# Patient Record
Sex: FEMALE | Race: BLACK OR AFRICAN AMERICAN | NOT HISPANIC OR LATINO | Employment: FULL TIME | URBAN - METROPOLITAN AREA
[De-identification: names, ages, dates, MRNs, and addresses within clinical notes are randomized per-mention and may not be internally consistent; named-entity substitution may affect disease eponyms.]

---

## 2017-02-23 ENCOUNTER — TELEPHONE (OUTPATIENT)
Dept: OBSTETRICS AND GYNECOLOGY | Facility: CLINIC | Age: 47
End: 2017-02-23

## 2017-02-23 NOTE — TELEPHONE ENCOUNTER
----- Message from Bro García sent at 2/23/2017  8:58 AM CST -----  Contact: self   Patient need orders for mammogram please call patient back when ready to schedule [phone] ([place])

## 2017-02-23 NOTE — TELEPHONE ENCOUNTER
I left a message for the patient letting her that we can get the mammogram the day of the appointment.

## 2017-04-13 RX ORDER — NORETHINDRONE AND ETHINYL ESTRADIOL 0.4-35(21)
KIT ORAL
Qty: 28 EACH | Refills: 0 | Status: SHIPPED | OUTPATIENT
Start: 2017-04-13 | End: 2017-05-10 | Stop reason: SDUPTHER

## 2017-05-10 ENCOUNTER — HOSPITAL ENCOUNTER (OUTPATIENT)
Dept: RADIOLOGY | Facility: CLINIC | Age: 47
Discharge: HOME | End: 2017-05-10
Attending: OBSTETRICS & GYNECOLOGY
Payer: COMMERCIAL

## 2017-05-10 ENCOUNTER — OFFICE VISIT (OUTPATIENT)
Dept: OBSTETRICS AND GYNECOLOGY | Facility: CLINIC | Age: 47
End: 2017-05-10
Payer: COMMERCIAL

## 2017-05-10 VITALS
BODY MASS INDEX: 28.84 KG/M2 | HEIGHT: 66 IN | WEIGHT: 179.44 LBS | DIASTOLIC BLOOD PRESSURE: 70 MMHG | SYSTOLIC BLOOD PRESSURE: 100 MMHG

## 2017-05-10 DIAGNOSIS — Z12.31 OTHER SCREENING MAMMOGRAM: ICD-10-CM

## 2017-05-10 DIAGNOSIS — Z12.4 PAP SMEAR FOR CERVICAL CANCER SCREENING: ICD-10-CM

## 2017-05-10 DIAGNOSIS — Z01.419 VISIT FOR GYNECOLOGIC EXAMINATION: Primary | ICD-10-CM

## 2017-05-10 PROCEDURE — 99999 PR PBB SHADOW E&M-EST. PATIENT-LVL III: CPT | Mod: PBBFAC,,, | Performed by: OBSTETRICS & GYNECOLOGY

## 2017-05-10 PROCEDURE — 99396 PREV VISIT EST AGE 40-64: CPT | Mod: S$GLB,,, | Performed by: OBSTETRICS & GYNECOLOGY

## 2017-05-10 PROCEDURE — 77067 SCR MAMMO BI INCL CAD: CPT | Mod: 26,,, | Performed by: RADIOLOGY

## 2017-05-10 PROCEDURE — 77067 SCR MAMMO BI INCL CAD: CPT | Mod: TC

## 2017-05-10 PROCEDURE — 88175 CYTOPATH C/V AUTO FLUID REDO: CPT

## 2017-05-10 RX ORDER — NORETHINDRONE AND ETHINYL ESTRADIOL AND FERROUS FUMARATE 0.4-35(21)
1 KIT ORAL DAILY
Qty: 28 EACH | Refills: 12 | Status: SHIPPED | OUTPATIENT
Start: 2017-05-10 | End: 2018-05-05 | Stop reason: SDUPTHER

## 2017-05-10 NOTE — MR AVS SNAPSHOT
[facility]  [address], [address]  [facility] [address]  Phone: [phone]  Fax: [phone]                  Matilda Decker   5/10/2017 2:00 PM   Office Visit    Description:  Female : 1970   Provider:  Tae Campos MD   [facility]           Reason for Visit     Well Woman           Diagnoses this Visit        Comments    Pap smear for cervical cancer screening    -  Primary     Other screening mammogram                To Do List           Goals (5 Years of Data)     None      [facility]     [facility] On [facility] Nurse Care Line -  Assistance  Unless otherwise directed by your provider, please contact [facility], our nurse care line that is available for  assistance.     Registered nurses in the [facility] On Call Center provide: appointment scheduling, clinical advisement, health education, and other advisory services.  Call: [phone] (toll free)               Medications           Message regarding Medications     Verify the changes and/or additions to your medication regime listed below are the same as discussed with your clinician today.  If any of these changes or additions are incorrect, please notify your healthcare provider.        STOP taking these medications     norethindrone-ethinyl estradiol (BRIELLYN) 0.4-35 mg-mcg per tablet Take 1 tablet by mouth once daily.           Verify that the below list of medications is an accurate representation of the medications you are currently taking.  If none reported, the list may be blank. If incorrect, please contact your healthcare provider. Carry this list with you in case of emergency.           Current Medications     fexofenadine (ALLEGRA) 30 MG tablet Take 30 mg by mouth 2 (two) times daily.    L. ACIDOPHILUS/DIG ENZ CMB 5 (PROBIOTIC-DIGESTIVE ENZYMES ORAL) Take by mouth.    WYMZYA FE 0.4mg-35mcg(21) and 75 mg (7) Chew TAKE 1 TABLET EVERY DAY          "  Clinical Reference Information           Your Vitals Were     BP Height Weight Last Period BMI    100/70 5' 6" (1.676 m) 81.4 kg (179 lb 7.3 oz) 05/03/2017 (Approximate) 28.96 kg/m2      Blood Pressure          Most Recent Value    BP  100/70      Allergies as of 5/10/2017     No Known Allergies      Immunizations Administered on Date of Encounter - 5/10/2017     None      Orders Placed During Today's Visit      Normal Orders This Visit    Liquid-based pap smear, screening     Future Labs/Procedures Expected by Expires    Mammo Digital Screening Bilat With CAD  5/10/2017 7/10/2018      Language Assistance Services     ATTENTION: Language assistance services are available, free of charge. Please call [phone].      ATENCIÓN: Si silvianola mason, tiene a herrera disposición servicios gratuitos de asistencia lingüística. Llame al [phone].     CHÚ Ý: N?u b?n nói Ti?ng Vi?t, có các d?ch v? h? tr? ngôn ng? mi?n phí dành cho b?n. G?i s? [phone].         [facility] complies with applicable Federal civil rights laws and does not discriminate on the basis of race, color, national origin, age, disability, or sex.        "

## 2017-05-10 NOTE — PROGRESS NOTES
Chief Complaint   Patient presents with    Well Woman       History and Physical:  Patient's last menstrual period was 2017 (approximate).       Matilda Decker is a 46 y.o. -American Female who presents today for her routine annual GYN exam. The patient has no Gynecology complaints today. No bowel or bladder complaints.       Allergies: Review of patient's allergies indicates:  No Known Allergies    Past Medical History:   Diagnosis Date    Abnormal Pap smear     repeat pap       Past Surgical History:   Procedure Laterality Date    CERVICAL BIOPSY  W/ LOOP ELECTRODE EXCISION      COLPOSCOPY         MEDS:   Current Outpatient Prescriptions on File Prior to Visit   Medication Sig Dispense Refill    fexofenadine (ALLEGRA) 30 MG tablet Take 30 mg by mouth 2 (two) times daily.      L. ACIDOPHILUS/DIG ENZ CMB 5 (PROBIOTIC-DIGESTIVE ENZYMES ORAL) Take by mouth.      WYMZYA FE 0.4mg-35mcg(21) and 75 mg (7) Chew TAKE 1 TABLET EVERY DAY 28 each 0    [DISCONTINUED] noreth-ethinyl estradiol-iron 0.4mg-35mcg(21) & 75 mg (7) Chew Take 1 tablet by mouth once daily. 28 each 4    [DISCONTINUED] norethindrone-ethinyl estradiol (BRIELLYN) 0.4-35 mg-mcg per tablet Take 1 tablet by mouth once daily. 30 tablet 11     No current facility-administered medications on file prior to visit.        OB History      Para Term  AB TAB SAB Ectopic Multiple Living    1 1                  Social History     Social History    Marital status:      Spouse name: N/A    Number of children: N/A    Years of education: N/A     Occupational History    Not on file.     Social History Main Topics    Smoking status: Never Smoker    Smokeless tobacco: Never Used    Alcohol use No    Drug use: No    Sexual activity: Not Currently     Partners: Male     Birth control/ protection: OCP     Other Topics Concern    Not on file     Social History Narrative       Family History   Problem Relation Age of Onset  "   Breast cancer Neg Hx     Ovarian cancer Neg Hx          Past medical and surgical history reviewed.   I have reviewed the patient's medical history in detail and updated the computerized patient record.        Review of System:   General: no chills, fever, night sweats, weight gain or weight loss  Psychological: no depression or suicidal ideation  Breasts: no new or changing breast lumps, nipple discharge or masses.  Respiratory: no cough, shortness of breath, or wheezing  Cardiovascular: no chest pain or dyspnea on exertion  Gastrointestinal: no abdominal pain, change in bowel habits, or black or bloody stools  Genito-Urinary: no incontinence, urinary frequency/urgency or vulvar/vaginal symptoms, pelvic pain or abnormal vaginal bleeding.  Musculoskeletal: no gait disturbance or muscular weakness      Physical Exam:   /70  Ht 5' 6" (1.676 m)  Wt 81.4 kg (179 lb 7.3 oz)  LMP 05/03/2017 (Approximate)  BMI 28.96 kg/m2  Constitutional: She is oriented to person, place, and time. She appears well-developed and well-nourished. No distress.   HENT:   Head: Normocephalic and atraumatic.   Eyes: Conjunctivae and EOM are normal. No scleral icterus.   Neck: Normal range of motion. Neck supple. No tracheal deviation present.   Cardiovascular: Normal rate.    Pulmonary/Chest: Effort normal. No respiratory distress. She exhibits no tenderness.  Breasts: are symmetrical.   Right breast exhibits no inverted nipple, no mass, no nipple discharge, no skin change and no tenderness.   Left breast exhibits no inverted nipple, no mass, no nipple discharge, no skin change and no tenderness.  Abdominal: Soft. She exhibits no distension and no mass. There is no tenderness. There is no rebound and no guarding.   Genitourinary:    External rectal exam shows no thrombosed external hemorrhoids.    Pelvic exam was performed with patient supine.   No labial fusion.   There is no rash, lesion or injury on the right labia.   There is " no rash, lesion or injury on the left labia.   No bleeding and no signs of injury around the vaginal introitus, urethra is without lesions and well supported. The cervix is visualized with no discharge, lesions or friability.   No vaginal discharge found.    No significant Cystocele, Enterocele or rectocele, and uterus well supported.   Bimanual exam:   The urethra is normal to palpation and there are no palpable vaginal wall masses.   Uterus is not deviated, not enlarged, not fixed, normal shape and not tender.   Cervix exhibits no motion tenderness.    Right adnexum displays no mass and no tenderness.   Left adnexum displays no mass and no tenderness.  Musculoskeletal: Normal range of motion.   Lymphadenopathy: No inguinal adenopathy present.   Neurological: She is alert and oriented to person, place, and time. Coordination normal.   Skin: Skin is warm and dry. She is not diaphoretic.   Psychiatric: She has a normal mood and affect.      Assessment:   Normal annual GYN exam  1. Pap smear for cervical cancer screening  Liquid-based pap smear, screening   2. Other screening mammogram  Mammo Digital Screening Bilat With CAD   doing well on oral contraceptive pills     Plan:   PAP  Mammogram  Follow up in 1 year.

## 2017-05-11 ENCOUNTER — TELEPHONE (OUTPATIENT)
Dept: RADIOLOGY | Facility: HOSPITAL | Age: 47
End: 2017-05-11

## 2017-05-12 ENCOUNTER — TELEPHONE (OUTPATIENT)
Dept: OBSTETRICS AND GYNECOLOGY | Facility: CLINIC | Age: 47
End: 2017-05-12

## 2017-05-12 NOTE — TELEPHONE ENCOUNTER
----- Message from Dulce Stephenson sent at 5/12/2017  2:36 PM CDT -----  Contact: [phone] or [phone]   Patient is returning nurse's phone call.  Please call patient back at [phone] or [phone].

## 2017-05-19 ENCOUNTER — HOSPITAL ENCOUNTER (OUTPATIENT)
Dept: RADIOLOGY | Facility: HOSPITAL | Age: 47
Discharge: HOME | End: 2017-05-19
Attending: OBSTETRICS & GYNECOLOGY
Payer: COMMERCIAL

## 2017-05-19 DIAGNOSIS — R92.8 ABNORMAL MAMMOGRAM OF RIGHT BREAST: ICD-10-CM

## 2017-05-19 PROCEDURE — 77065 DX MAMMO INCL CAD UNI: CPT | Mod: 26,,, | Performed by: RADIOLOGY

## 2017-05-19 PROCEDURE — 77061 BREAST TOMOSYNTHESIS UNI: CPT | Mod: 26,,, | Performed by: RADIOLOGY

## 2017-05-19 PROCEDURE — 77061 BREAST TOMOSYNTHESIS UNI: CPT | Mod: TC

## 2017-05-19 PROCEDURE — 76642 ULTRASOUND BREAST LIMITED: CPT | Mod: TC,PO,RT

## 2017-05-19 PROCEDURE — 76642 ULTRASOUND BREAST LIMITED: CPT | Mod: 26,RT,, | Performed by: RADIOLOGY

## 2017-12-18 ENCOUNTER — HISTORICAL (OUTPATIENT)
Dept: ADMINISTRATIVE | Facility: HOSPITAL | Age: 47
End: 2017-12-18

## 2017-12-19 LAB
LAB AP CLINICAL INFORMATION: NORMAL
LAB AP GENERAL CAT - HISTORICAL: NORMAL
LAB AP INTERPRETATION/RESULT - HISTORICAL: NEGATIVE
LAB AP SPECIMEN ADEQUACY - HISTORICAL: NORMAL
LAB AP SPECIMEN SUBMITTED - HISTORICAL: NORMAL

## 2018-05-05 RX ORDER — NORETHINDRONE AND ETHINYL ESTRADIOL AND FERROUS FUMARATE 0.4-35(21)
1 KIT ORAL DAILY
Qty: 28 EACH | Refills: 0 | Status: SHIPPED | OUTPATIENT
Start: 2018-05-05 | End: 2018-06-08 | Stop reason: SDUPTHER

## 2018-06-08 RX ORDER — NORETHINDRONE AND ETHINYL ESTRADIOL AND FERROUS FUMARATE 0.4-35(21)
KIT ORAL
Qty: 28 EACH | Refills: 0 | Status: SHIPPED | OUTPATIENT
Start: 2018-06-08 | End: 2018-06-13 | Stop reason: SDUPTHER

## 2018-06-13 ENCOUNTER — HOSPITAL ENCOUNTER (OUTPATIENT)
Dept: RADIOLOGY | Facility: HOSPITAL | Age: 48
Discharge: HOME | End: 2018-06-13
Attending: OBSTETRICS & GYNECOLOGY
Payer: COMMERCIAL

## 2018-06-13 ENCOUNTER — OFFICE VISIT (OUTPATIENT)
Dept: OBSTETRICS AND GYNECOLOGY | Facility: CLINIC | Age: 48
End: 2018-06-13
Payer: COMMERCIAL

## 2018-06-13 VITALS
BODY MASS INDEX: 28.36 KG/M2 | SYSTOLIC BLOOD PRESSURE: 102 MMHG | DIASTOLIC BLOOD PRESSURE: 70 MMHG | WEIGHT: 175.69 LBS

## 2018-06-13 DIAGNOSIS — Z12.4 ENCOUNTER FOR PAP SMEAR OF CERVIX WITH HPV DNA COTESTING: ICD-10-CM

## 2018-06-13 DIAGNOSIS — Z12.31 VISIT FOR SCREENING MAMMOGRAM: ICD-10-CM

## 2018-06-13 DIAGNOSIS — Z12.31 VISIT FOR SCREENING MAMMOGRAM: Primary | ICD-10-CM

## 2018-06-13 PROCEDURE — 99396 PREV VISIT EST AGE 40-64: CPT | Mod: S$GLB,,, | Performed by: OBSTETRICS & GYNECOLOGY

## 2018-06-13 PROCEDURE — 88175 CYTOPATH C/V AUTO FLUID REDO: CPT

## 2018-06-13 PROCEDURE — 99999 PR PBB SHADOW E&M-EST. PATIENT-LVL III: CPT | Mod: PBBFAC,,, | Performed by: OBSTETRICS & GYNECOLOGY

## 2018-06-13 PROCEDURE — 87624 HPV HI-RISK TYP POOLED RSLT: CPT

## 2018-06-13 RX ORDER — NORETHINDRONE AND ETHINYL ESTRADIOL AND FERROUS FUMARATE 0.4-35(21)
1 KIT ORAL DAILY
Qty: 28 EACH | Refills: 12 | Status: SHIPPED | OUTPATIENT
Start: 2018-06-13 | End: 2019-07-02 | Stop reason: SDUPTHER

## 2018-06-13 NOTE — PROGRESS NOTES
Chief Complaint   Patient presents with    Well Woman       History and Physical:  Patient's last menstrual period was 2018.       Matilda Decker is a 47 y.o. -American Female who presents today for her routine annual GYN exam. The patient has no Gynecology complaints today. No bowel or bladder complaints. Doing well on oral contraceptive pills       Allergies: Review of patient's allergies indicates:  No Known Allergies    Past Medical History:   Diagnosis Date    Abnormal Pap smear     repeat pap       Past Surgical History:   Procedure Laterality Date    CERVICAL BIOPSY  W/ LOOP ELECTRODE EXCISION      COLPOSCOPY         MEDS:   Current Outpatient Prescriptions on File Prior to Visit   Medication Sig Dispense Refill    [DISCONTINUED] noreth-ethinyl estradiol-iron 0.4mg-35mcg(21) and 75 mg (7) Chew TAKE 1 TABLET BY MOUTH EVERY DAY 28 each 0    fexofenadine (ALLEGRA) 30 MG tablet Take 30 mg by mouth 2 (two) times daily.      L. ACIDOPHILUS/DIG ENZ CMB 5 (PROBIOTIC-DIGESTIVE ENZYMES ORAL) Take by mouth.       No current facility-administered medications on file prior to visit.        OB History      Para Term  AB Living    1 1            SAB TAB Ectopic Multiple Live Births                       Social History     Social History    Marital status:      Spouse name: N/A    Number of children: N/A    Years of education: N/A     Occupational History    Not on file.     Social History Main Topics    Smoking status: Never Smoker    Smokeless tobacco: Never Used    Alcohol use No    Drug use: No    Sexual activity: Not Currently     Partners: Male     Birth control/ protection: OCP     Other Topics Concern    Not on file     Social History Narrative    No narrative on file       Family History   Problem Relation Age of Onset    Breast cancer Neg Hx     Ovarian cancer Neg Hx          Past medical and surgical history reviewed.   I have reviewed the patient's  medical history in detail and updated the computerized patient record.        Review of System:  General: no chills, fever, night sweats, weight gain or weight loss  Psychological: no depression or suicidal ideation  Breasts: no new or changing breast lumps, nipple discharge or masses.  Respiratory: no cough, shortness of breath, or wheezing  Cardiovascular: no chest pain or dyspnea on exertion  Gastrointestinal: no abdominal pain, change in bowel habits, or black or bloody stools  Genito-Urinary: no incontinence, urinary frequency/urgency or vulvar/vaginal symptoms, pelvic pain or abnormal vaginal bleeding.  Musculoskeletal: no gait disturbance or muscular weakness      Physical Exam:   /70   Wt 79.7 kg (175 lb 11.3 oz)   LMP 06/01/2018   BMI 28.36 kg/m²   Constitutional: She is oriented to person, place, and time. She appears well-developed and well-nourished. No distress.   HENT:   Head: Normocephalic and atraumatic.   Eyes: Conjunctivae and EOM are normal. No scleral icterus.   Neck: Normal range of motion. Neck supple. No tracheal deviation present.   Cardiovascular: Normal rate.    Pulmonary/Chest: Effort normal. No respiratory distress. She exhibits no tenderness.  Breasts: are symmetrical.   Right breast exhibits no inverted nipple, no mass, no nipple discharge, no skin change and no tenderness.   Left breast exhibits no inverted nipple, no mass, no nipple discharge, no skin change and no tenderness.  Abdominal: Soft. She exhibits no distension and no mass. There is no tenderness. There is no rebound and no guarding.   Genitourinary:    External rectal exam shows no thrombosed external hemorrhoids.    Pelvic exam was performed with patient supine.   No labial fusion.   There is no rash, lesion or injury on the right labia.   There is no rash, lesion or injury on the left labia.   No bleeding and no signs of injury around the vaginal introitus, urethra is without lesions and well supported. The  cervix is visualized with no discharge, lesions or friability.   No vaginal discharge found.    No significant Cystocele, Enterocele or rectocele, and uterus well supported.   Bimanual exam:   The urethra is normal to palpation and there are no palpable vaginal wall masses.   Uterus is not deviated, not enlarged, not fixed, normal shape and not tender.   Cervix exhibits no motion tenderness.    Right adnexum displays no mass and no tenderness.   Left adnexum displays no mass and no tenderness.  Musculoskeletal: Normal range of motion.   Lymphadenopathy: No inguinal adenopathy present.   Neurological: She is alert and oriented to person, place, and time. Coordination normal.   Skin: Skin is warm and dry. She is not diaphoretic.   Psychiatric: She has a normal mood and affect.      Assessment:   Normal annual GYN exam  1. Visit for screening mammogram  Mammo Digital Screening Bilat with CAD   2. Encounter for Pap smear of cervix with HPV DNA cotesting  Liquid-based pap smear, screening    HPV High Risk Genotypes, PCR       Plan:   PAP  Mammogram  Follow up in 1 year.  Patient informed will be contacted with results within 2 weeks. Encouraged to please call back or email if she has not heard from us by then.

## 2018-06-19 LAB
HPV HR 12 DNA CVX QL NAA+PROBE: NEGATIVE
HPV16 AG SPEC QL: NEGATIVE
HPV18 DNA SPEC QL NAA+PROBE: NEGATIVE

## 2018-06-21 ENCOUNTER — HOSPITAL ENCOUNTER (OUTPATIENT)
Dept: RADIOLOGY | Facility: HOSPITAL | Age: 48
Discharge: HOME | End: 2018-06-21
Attending: RADIOLOGY
Payer: COMMERCIAL

## 2018-06-21 ENCOUNTER — HOSPITAL ENCOUNTER (OUTPATIENT)
Dept: RADIOLOGY | Facility: HOSPITAL | Age: 48
Discharge: HOME | End: 2018-06-21
Attending: OBSTETRICS & GYNECOLOGY
Payer: COMMERCIAL

## 2018-06-21 DIAGNOSIS — R92.8 ABNORMAL MAMMOGRAM OF RIGHT BREAST: ICD-10-CM

## 2018-06-21 DIAGNOSIS — R92.8 FOLLOW-UP EXAMINATION OF ABNORMAL MAMMOGRAM: ICD-10-CM

## 2018-06-21 PROCEDURE — 77062 BREAST TOMOSYNTHESIS BI: CPT | Mod: 26,,, | Performed by: RADIOLOGY

## 2018-06-21 PROCEDURE — 77066 DX MAMMO INCL CAD BI: CPT | Mod: TC,PO

## 2018-06-21 PROCEDURE — 76642 ULTRASOUND BREAST LIMITED: CPT | Mod: TC,PO,RT

## 2018-06-21 PROCEDURE — 77067 SCR MAMMO BI INCL CAD: CPT | Mod: TC,PO

## 2018-06-21 PROCEDURE — 76642 ULTRASOUND BREAST LIMITED: CPT | Mod: TC,50,PO

## 2018-06-21 PROCEDURE — 77066 DX MAMMO INCL CAD BI: CPT | Mod: 26,,, | Performed by: RADIOLOGY

## 2018-06-21 PROCEDURE — 76642 ULTRASOUND BREAST LIMITED: CPT | Mod: 26,50,, | Performed by: RADIOLOGY

## 2018-06-22 ENCOUNTER — PATIENT MESSAGE (OUTPATIENT)
Dept: OBSTETRICS AND GYNECOLOGY | Facility: CLINIC | Age: 48
End: 2018-06-22

## 2019-07-02 RX ORDER — NORETHINDRONE AND ETHINYL ESTRADIOL AND FERROUS FUMARATE 0.4-35(21)
KIT ORAL
Qty: 30 EACH | Refills: 0 | Status: SHIPPED | OUTPATIENT
Start: 2019-07-02 | End: 2019-07-28 | Stop reason: SDUPTHER

## 2019-07-29 RX ORDER — NORETHINDRONE AND ETHINYL ESTRADIOL AND FERROUS FUMARATE 0.4-35(21)
KIT ORAL
Qty: 28 EACH | Refills: 0 | Status: SHIPPED | OUTPATIENT
Start: 2019-07-29 | End: 2019-08-08 | Stop reason: SDUPTHER

## 2019-08-08 ENCOUNTER — OFFICE VISIT (OUTPATIENT)
Dept: OBSTETRICS AND GYNECOLOGY | Facility: CLINIC | Age: 49
End: 2019-08-08
Payer: COMMERCIAL

## 2019-08-08 VITALS
DIASTOLIC BLOOD PRESSURE: 62 MMHG | WEIGHT: 190.69 LBS | BODY MASS INDEX: 30.78 KG/M2 | SYSTOLIC BLOOD PRESSURE: 116 MMHG

## 2019-08-08 DIAGNOSIS — R92.8 ABNORMAL MAMMOGRAM: ICD-10-CM

## 2019-08-08 DIAGNOSIS — Z01.419 ENCOUNTER FOR GYNECOLOGICAL EXAMINATION WITHOUT ABNORMAL FINDING: Primary | ICD-10-CM

## 2019-08-08 PROCEDURE — 88175 CYTOPATH C/V AUTO FLUID REDO: CPT

## 2019-08-08 PROCEDURE — 99999 PR PBB SHADOW E&M-EST. PATIENT-LVL III: CPT | Mod: PBBFAC,,, | Performed by: OBSTETRICS & GYNECOLOGY

## 2019-08-08 PROCEDURE — 99396 PR PREVENTIVE VISIT,EST,40-64: ICD-10-PCS | Mod: S$GLB,,, | Performed by: OBSTETRICS & GYNECOLOGY

## 2019-08-08 PROCEDURE — 99396 PREV VISIT EST AGE 40-64: CPT | Mod: S$GLB,,, | Performed by: OBSTETRICS & GYNECOLOGY

## 2019-08-08 PROCEDURE — 87624 HPV HI-RISK TYP POOLED RSLT: CPT

## 2019-08-08 PROCEDURE — 99999 PR PBB SHADOW E&M-EST. PATIENT-LVL III: ICD-10-PCS | Mod: PBBFAC,,, | Performed by: OBSTETRICS & GYNECOLOGY

## 2019-08-08 RX ORDER — NORETHINDRONE AND ETHINYL ESTRADIOL AND FERROUS FUMARATE 0.4-35(21)
1 KIT ORAL DAILY
Qty: 90 EACH | Refills: 3 | Status: SHIPPED | OUTPATIENT
Start: 2019-08-08 | End: 2020-07-15 | Stop reason: SDUPTHER

## 2019-08-08 NOTE — PROGRESS NOTES
Chief Complaint   Patient presents with    Well Woman       History and Physical:  Patient's last menstrual period was 2019.       Matilda Decker is a 48 y.o. -American Female who presents today for her routine annual GYN exam. The patient has no Gynecology complaints today. No bowel or bladder complaints. Doing well on oral contraceptive pills       Allergies: Review of patient's allergies indicates:  No Known Allergies    Past Medical History:   Diagnosis Date    Abnormal Pap smear     repeat pap       Past Surgical History:   Procedure Laterality Date    CERVICAL BIOPSY  W/ LOOP ELECTRODE EXCISION      COLPOSCOPY         MEDS:   Current Outpatient Medications on File Prior to Visit   Medication Sig Dispense Refill    fexofenadine (ALLEGRA) 30 MG tablet Take 30 mg by mouth 2 (two) times daily.      L. ACIDOPHILUS/DIG ENZ CMB 5 (PROBIOTIC-DIGESTIVE ENZYMES ORAL) Take by mouth.      noreth-ethinyl estradiol-iron 0.4mg-35mcg(21) and 75 mg (7) Chew TAKE 1 TABLET BY MOUTH EVERY DAY 28 each 0     No current facility-administered medications on file prior to visit.        OB History        2    Para   2    Term   1            AB        Living           SAB        TAB        Ectopic        Multiple        Live Births                     Social History     Socioeconomic History    Marital status:      Spouse name: Not on file    Number of children: Not on file    Years of education: Not on file    Highest education level: Not on file   Occupational History    Not on file   Social Needs    Financial resource strain: Not on file    Food insecurity:     Worry: Not on file     Inability: Not on file    Transportation needs:     Medical: Not on file     Non-medical: Not on file   Tobacco Use    Smoking status: Never Smoker    Smokeless tobacco: Never Used   Substance and Sexual Activity    Alcohol use: No    Drug use: No    Sexual activity: Not Currently     Partners:  Male     Birth control/protection: OCP   Lifestyle    Physical activity:     Days per week: Not on file     Minutes per session: Not on file    Stress: Not on file   Relationships    Social connections:     Talks on phone: Not on file     Gets together: Not on file     Attends [employer] service: Not on file     Active member of club or organization: Not on file     Attends meetings of clubs or organizations: Not on file     Relationship status: Not on file   Other Topics Concern    Not on file   Social History Narrative    Not on file       Family History   Problem Relation Age of Onset    Breast cancer Neg Hx     Ovarian cancer Neg Hx          Past medical and surgical history reviewed.   I have reviewed the patient's medical history in detail and updated the computerized patient record.        Review of System:   General: no chills, fever, night sweats, weight gain or weight loss  Psychological: no depression or suicidal ideation  Breasts: no new or changing breast lumps, nipple discharge or masses.  Respiratory: no cough, shortness of breath, or wheezing  Cardiovascular: no chest pain or dyspnea on exertion  Gastrointestinal: no abdominal pain, change in bowel habits, or black or bloody stools  Genito-Urinary: no incontinence, urinary frequency/urgency or vulvar/vaginal symptoms, pelvic pain or abnormal vaginal bleeding.  Musculoskeletal: no gait disturbance or muscular weakness      Physical Exam:   /62   Wt 86.5 kg (190 lb 11.2 oz)   LMP 07/05/2019   BMI 30.78 kg/m²   Constitutional: She appears alert and responsive. She appears well-developed, well-groomed, and well-nourished. No distress. Normal weight  HENT:   Head: Normocephalic and atraumatic.   Eyes: Conjunctivae and EOM are normal. No scleral icterus.   Neck: Symmetrical. Normal range of motion. Neck supple. No tracheal deviation present. THYROID: without masses or tenderness.  Cardiovascular: Normal rate, no rhythm abnormality noted.  Extremities without swelling or edema, warm.    Pulmonary/Chest: Normal respiratory Effort. No distress or retractions. She exhibits no tenderness.  Breasts: are symmetrical.   Right breast exhibits no inverted nipple, no mass, no nipple discharge, no skin change and no tenderness.   Left breast exhibits no inverted nipple, no mass, no nipple discharge, no skin change and no tenderness.  Abdominal: Soft. She exhibits no distension, hernias or masses. There is no tenderness. No enlargement of liver edge or spleen.  There is no rebound and no guarding.   Genitourinary:    External rectal exam shows no thrombosed external hemorrhoids, no lesions.     Pelvic exam was performed with patient supine.   No labial fusion, and symmetrical.    There is no rash, lesion or injury on the right labia.   There is no rash, lesion or injury on the left labia.   No bleeding and no signs of injury around the vaginal introitus, urethral meatus is normal size and without prolapse or lesions, urethra well supported. The cervix is visualized with no discharge, lesions or friability.   No vaginal discharge found.   No significant Cystocele, Enterocele or rectocele, and cervix and uterus well supported.   Bimanual exam:   The urethra is normal to palpation and there are no palpable vaginal wall masses.   Uterus is not deviated, not enlarged, not fixed, normal shape and not tender.   Cervix exhibits no motion tenderness.    Right adnexum displays no mass or nodularity and no tenderness.   Left adnexum displays no mass or nodularity and no tenderness.  Musculoskeletal: Normal range of motion.   Lymphadenopathy: No inguinal adenopathy present.   Neurological: She is alert and oriented to person, place, and time. Coordination normal.   Skin: Skin is warm and dry. She is not diaphoretic. No rashes, lesions or ulcers.   Psychiatric: She has a normal mood and affect, oriented to person, place, and time.      Assessment:   Normal annual GYN exam  1.  Encounter for gynecological examination without abnormal finding  Liquid-based pap smear, screening    HPV High Risk Genotypes, PCR   2. Abnormal mammogram  Mammo Digital Diagnostic Bilateral With CAD    US Breast Bilateral Complete       Plan:   PAP  Mammogram  Refill oral contraceptive pills   Follow up in 1 year   Chief Complaint   Patient presents with    Well Woman

## 2019-08-16 ENCOUNTER — PATIENT MESSAGE (OUTPATIENT)
Dept: OBSTETRICS AND GYNECOLOGY | Facility: CLINIC | Age: 49
End: 2019-08-16

## 2019-08-16 ENCOUNTER — HOSPITAL ENCOUNTER (OUTPATIENT)
Dept: RADIOLOGY | Facility: HOSPITAL | Age: 49
Discharge: HOME | End: 2019-08-16
Attending: OBSTETRICS & GYNECOLOGY
Payer: COMMERCIAL

## 2019-08-16 DIAGNOSIS — R92.8 ABNORMAL MAMMOGRAM: ICD-10-CM

## 2019-08-16 DIAGNOSIS — R92.8 ABNORMAL MAMMOGRAM OF RIGHT BREAST: ICD-10-CM

## 2019-08-16 PROCEDURE — 76642 ULTRASOUND BREAST LIMITED: CPT | Mod: 26,50,, | Performed by: RADIOLOGY

## 2019-08-16 PROCEDURE — 77062 BREAST TOMOSYNTHESIS BI: CPT | Mod: TC,PO

## 2019-08-16 PROCEDURE — 77066 DX MAMMO INCL CAD BI: CPT | Mod: 26,,, | Performed by: RADIOLOGY

## 2019-08-16 PROCEDURE — 77066 MAMMO DIGITAL DIAGNOSTIC BILAT WITH TOMOSYNTHESIS_CAD: ICD-10-PCS | Mod: 26,,, | Performed by: RADIOLOGY

## 2019-08-16 PROCEDURE — 76642 ULTRASOUND BREAST LIMITED: CPT | Mod: TC,50,PO

## 2019-08-16 PROCEDURE — 76642 US BREAST BILATERAL LIMITED: ICD-10-PCS | Mod: 26,50,, | Performed by: RADIOLOGY

## 2019-08-16 PROCEDURE — 77062 BREAST TOMOSYNTHESIS BI: CPT | Mod: 26,,, | Performed by: RADIOLOGY

## 2019-08-16 PROCEDURE — 77062 MAMMO DIGITAL DIAGNOSTIC BILAT WITH TOMOSYNTHESIS_CAD: ICD-10-PCS | Mod: 26,,, | Performed by: RADIOLOGY

## 2020-07-15 RX ORDER — NORETHINDRONE AND ETHINYL ESTRADIOL AND FERROUS FUMARATE 0.4-35(21)
1 KIT ORAL DAILY
Qty: 90 EACH | Refills: 0 | Status: SHIPPED | OUTPATIENT
Start: 2020-07-15 | End: 2020-09-29 | Stop reason: SDUPTHER

## 2020-07-15 NOTE — TELEPHONE ENCOUNTER
Refill request for OCP , next appointment due in August with [facility] and U/S , patient notified by phone to sched after 8/8/20 pap

## 2020-09-28 DIAGNOSIS — Z12.31 ENCOUNTER FOR SCREENING MAMMOGRAM FOR BREAST CANCER: Primary | ICD-10-CM

## 2020-09-29 ENCOUNTER — OFFICE VISIT (OUTPATIENT)
Dept: OBSTETRICS AND GYNECOLOGY | Facility: CLINIC | Age: 50
End: 2020-09-29
Payer: COMMERCIAL

## 2020-09-29 ENCOUNTER — HOSPITAL ENCOUNTER (OUTPATIENT)
Dept: RADIOLOGY | Facility: HOSPITAL | Age: 50
Discharge: HOME | End: 2020-09-29
Attending: OBSTETRICS & GYNECOLOGY
Payer: COMMERCIAL

## 2020-09-29 VITALS
SYSTOLIC BLOOD PRESSURE: 118 MMHG | BODY MASS INDEX: 31.56 KG/M2 | DIASTOLIC BLOOD PRESSURE: 64 MMHG | WEIGHT: 195.56 LBS

## 2020-09-29 DIAGNOSIS — Z12.31 ENCOUNTER FOR SCREENING MAMMOGRAM FOR BREAST CANCER: ICD-10-CM

## 2020-09-29 DIAGNOSIS — Z12.4 PAP SMEAR FOR CERVICAL CANCER SCREENING: Primary | ICD-10-CM

## 2020-09-29 PROCEDURE — 77063 BREAST TOMOSYNTHESIS BI: CPT | Mod: 26,,, | Performed by: RADIOLOGY

## 2020-09-29 PROCEDURE — 88175 CYTOPATH C/V AUTO FLUID REDO: CPT

## 2020-09-29 PROCEDURE — 77067 MAMMO DIGITAL SCREENING BILAT WITH TOMO: ICD-10-PCS | Mod: 26,,, | Performed by: RADIOLOGY

## 2020-09-29 PROCEDURE — 99999 PR PBB SHADOW E&M-EST. PATIENT-LVL III: CPT | Mod: PBBFAC,,, | Performed by: OBSTETRICS & GYNECOLOGY

## 2020-09-29 PROCEDURE — 77067 SCR MAMMO BI INCL CAD: CPT | Mod: TC,PN

## 2020-09-29 PROCEDURE — 99396 PREV VISIT EST AGE 40-64: CPT | Mod: S$GLB,,, | Performed by: OBSTETRICS & GYNECOLOGY

## 2020-09-29 PROCEDURE — 77063 MAMMO DIGITAL SCREENING BILAT WITH TOMO: ICD-10-PCS | Mod: 26,,, | Performed by: RADIOLOGY

## 2020-09-29 PROCEDURE — 99999 PR PBB SHADOW E&M-EST. PATIENT-LVL III: ICD-10-PCS | Mod: PBBFAC,,, | Performed by: OBSTETRICS & GYNECOLOGY

## 2020-09-29 PROCEDURE — 99396 PR PREVENTIVE VISIT,EST,40-64: ICD-10-PCS | Mod: S$GLB,,, | Performed by: OBSTETRICS & GYNECOLOGY

## 2020-09-29 PROCEDURE — 77067 SCR MAMMO BI INCL CAD: CPT | Mod: 26,,, | Performed by: RADIOLOGY

## 2020-09-29 RX ORDER — NORETHINDRONE AND ETHINYL ESTRADIOL AND FERROUS FUMARATE 0.4-35(21)
1 KIT ORAL DAILY
Qty: 90 EACH | Refills: 3 | Status: SHIPPED | OUTPATIENT
Start: 2020-09-29 | End: 2021-09-17

## 2020-09-29 NOTE — PROGRESS NOTES
Chief Complaint   Patient presents with    Well Woman       History and Physical:  Patient's last menstrual period was 2020 (approximate).       Matilda Decker is a 50 y.o. -American Female who presents today for her routine annual GYN exam. The patient has no Gynecology complaints today. No bowel or bladder complaints. - doing well on oral contraceptive pills with regular menses      Allergies: Review of patient's allergies indicates:  No Known Allergies    Past Medical History:   Diagnosis Date    Abnormal Pap smear     repeat pap       Past Surgical History:   Procedure Laterality Date    CERVICAL BIOPSY  W/ LOOP ELECTRODE EXCISION      COLPOSCOPY         MEDS:   Current Outpatient Medications on File Prior to Visit   Medication Sig Dispense Refill    fexofenadine (ALLEGRA) 30 MG tablet Take 30 mg by mouth 2 (two) times daily.      L. ACIDOPHILUS/DIG ENZ CMB 5 (PROBIOTIC-DIGESTIVE ENZYMES ORAL) Take by mouth.      noreth-ethinyl estradioL-iron 0.4mg-35mcg(21) and 75 mg (7) Chew Take 1 tablet by mouth once daily. 90 each 0     No current facility-administered medications on file prior to visit.        OB History        2    Para   2    Term   1            AB        Living           SAB        TAB        Ectopic        Multiple        Live Births                     Social History     Socioeconomic History    Marital status:      Spouse name: Not on file    Number of children: Not on file    Years of education: Not on file    Highest education level: Not on file   Occupational History    Not on file   Social Needs    Financial resource strain: Not on file    Food insecurity     Worry: Not on file     Inability: Not on file    Transportation needs     Medical: Not on file     Non-medical: Not on file   Tobacco Use    Smoking status: Never Smoker    Smokeless tobacco: Never Used   Substance and Sexual Activity    Alcohol use: No    Drug use: No    Sexual  activity: Not Currently     Partners: Male     Birth control/protection: OCP   Lifestyle    Physical activity     Days per week: Not on file     Minutes per session: Not on file    Stress: Not on file   Relationships    Social connections     Talks on phone: Not on file     Gets together: Not on file     Attends Muslim service: Not on file     Active member of club or organization: Not on file     Attends meetings of clubs or organizations: Not on file     Relationship status: Not on file   Other Topics Concern    Not on file   Social History Narrative    Not on file       Family History   Problem Relation Age of Onset    Breast cancer Neg Hx     Ovarian cancer Neg Hx          Past medical and surgical history reviewed.   I have reviewed the patient's medical history in detail and updated the computerized patient record.        Review of System:   General: no chills, fever, night sweats, weight gain or weight loss  Psychological: no depression or suicidal ideation  Breasts: no new or changing breast lumps, nipple discharge or masses.  Respiratory: no cough, shortness of breath, or wheezing  Cardiovascular: no chest pain or dyspnea on exertion  Gastrointestinal: no abdominal pain, change in bowel habits, or black or bloody stools  Genito-Urinary: no incontinence, urinary frequency/urgency or vulvar/vaginal symptoms, pelvic pain or abnormal vaginal bleeding.  Musculoskeletal: no gait disturbance or muscular weakness      Physical Exam:   /64   Wt 88.7 kg (195 lb 8.8 oz)   LMP 09/05/2020 (Approximate)   BMI 31.56 kg/m²   Constitutional: She appears alert and responsive. She appears well-developed, well-groomed, and well-nourished. No distress. OverWeight   HENT:   Head: Normocephalic and atraumatic.   Eyes: Conjunctivae and EOM are normal. No scleral icterus.   Neck: Symmetrical. Normal range of motion. Neck supple. No tracheal deviation present. THYROID: without masses or  tenderness.  Cardiovascular: Normal rate, no rhythm abnormality noted. Extremities without swelling or edema, warm.    Pulmonary/Chest: Normal respiratory Effort. No distress or retractions. She exhibits no tenderness.  Breasts: are symmetrical.   Right breast exhibits no inverted nipple, no mass, no nipple discharge, no skin change and no tenderness.   Left breast exhibits no inverted nipple, no mass, no nipple discharge, no skin change and no tenderness.  Abdominal: Soft. She exhibits no distension, hernias or masses. There is no tenderness. No enlargement of liver edge or spleen.  There is no rebound and no guarding.   Genitourinary:    External rectal exam shows no thrombosed external hemorrhoids, no lesions.     Pelvic exam was performed with patient supine.   No labial fusion, and symmetrical.    There is no rash, lesion or injury on the right labia.   There is no rash, lesion or injury on the left labia.   No bleeding and no signs of injury around the vaginal introitus, urethral meatus is normal size and without prolapse or lesions, urethra well supported. The cervix is visualized with no discharge, lesions or friability.   No vaginal discharge found.   No significant Cystocele, Enterocele or rectocele, and cervix and uterus well supported.   Bimanual exam:   The urethra is normal to palpation and there are no palpable vaginal wall masses.   Uterus is not deviated, not enlarged, not fixed, normal shape and not tender.   Cervix exhibits no motion tenderness.    Right adnexum displays no mass or nodularity and no tenderness.   Left adnexum displays no mass or nodularity and no tenderness.  Musculoskeletal: Normal range of motion.   Lymphadenopathy: No inguinal adenopathy present.   Neurological: She is alert and oriented to person, place, and time. Coordination normal.   Skin: Skin is warm and dry. She is not diaphoretic. No rashes, lesions or ulcers.   Psychiatric: She has a normal mood and affect, oriented to  person, place, and time.      Assessment:   Normal annual GYN exam  1. Pap smear for cervical cancer screening  Liquid-Based Pap Smear, Screening       Plan:   PAP  Refill oral contraceptive pills Mammogram  Follow up in 1 year.  Patient informed will be contacted with results within 2 weeks. Encouraged to please call back or email if she has not heard from us by then.

## 2020-10-07 ENCOUNTER — HISTORICAL (OUTPATIENT)
Dept: ADMINISTRATIVE | Facility: HOSPITAL | Age: 50
End: 2020-10-07

## 2020-10-07 LAB
BASOPHILS # BLD AUTO: 0.18 X10E3/UL (ref 0–0.2)
BASOPHILS NFR BLD AUTO: 3 % (ref 0–1.5)
EOSINOPHIL # BLD AUTO: 0.26 X10E3/UL (ref 0–0.8)
EOSINOPHIL NFR BLD AUTO: 4.3 % (ref 0–7)
ERYTHROCYTE [DISTWIDTH] IN BLOOD BY AUTOMATED COUNT: 15.9 % (ref 11.5–14.5)
EST. AVERAGE GLUCOSE BLD GHB EST-MCNC: 134 MG/DL
HBA1C MFR BLD HPLC: 6.6 % (ref 4.5–6.2)
HCT VFR BLD AUTO: 36.3 % (ref 42–52)
HGB BLD-MCNC: 11.8 G/DL (ref 14–18)
IMM GRANULOCYTES # BLD AUTO: 0.15 X10E3/UL (ref 0–0.04)
IMM GRANULOCYTES NFR BLD: 2.4 % (ref 0–0.4)
LYMPHOCYTES # BLD AUTO: 1.1 X10E3/UL (ref 0.9–5.2)
LYMPHOCYTES NFR BLD AUTO: 18.1 % (ref 19–48)
MCH RBC QN AUTO: 29.6 PG (ref 27–31)
MCHC RBC AUTO-ENTMCNC: 32.5 G/DL (ref 33–37)
MCV RBC AUTO: 91 FL (ref 80–94)
MONOCYTES # BLD AUTO: 1.06 X10E3/UL (ref 0.16–1)
MONOCYTES NFR BLD AUTO: 17.3 % (ref 3.4–9)
MPC BLD CALC-MCNC: 8 FL (ref 7.2–11.1)
NEUTROPHILS # BLD AUTO: 3.36 X10E3/UL (ref 1.9–8)
NEUTROPHILS NFR BLD AUTO: 55 % (ref 40–74)
PLATELET # BLD AUTO: 359 X10E3/UL (ref 130–400)
RBC # BLD AUTO: 3.99 X10E6/UL (ref 4.7–6.1)
WBC # BLD AUTO: 6.11 X10E3/UL (ref 5.2–12.4)

## 2020-10-19 ENCOUNTER — HISTORICAL (OUTPATIENT)
Dept: ADMINISTRATIVE | Facility: HOSPITAL | Age: 50
End: 2020-10-19

## 2020-10-20 LAB — GLUCOSE SERPL-MCNC: 103 MG/DL (ref 70–105)

## 2020-10-22 LAB
FINAL PATHOLOGIC DIAGNOSIS: NORMAL
Lab: NORMAL

## 2020-11-11 ENCOUNTER — HISTORICAL (OUTPATIENT)
Dept: ADMINISTRATIVE | Facility: HOSPITAL | Age: 50
End: 2020-11-11

## 2021-05-03 ENCOUNTER — HISTORICAL (OUTPATIENT)
Dept: ADMINISTRATIVE | Facility: HOSPITAL | Age: 51
End: 2021-05-03

## 2021-05-04 RX ORDER — POTASSIUM CHLORIDE 20 MEQ/1
20 TABLET, EXTENDED RELEASE ORAL 2 TIMES DAILY
COMMUNITY
End: 2021-06-23 | Stop reason: SDUPTHER

## 2021-05-04 RX ORDER — CYCLOBENZAPRINE HCL 10 MG
10 TABLET ORAL 3 TIMES DAILY PRN
COMMUNITY
End: 2022-01-24 | Stop reason: SDUPTHER

## 2021-05-04 RX ORDER — NABUMETONE 750 MG/1
750 TABLET, FILM COATED ORAL 2 TIMES DAILY
COMMUNITY
End: 2022-10-17 | Stop reason: SDUPTHER

## 2021-05-04 RX ORDER — GLIMEPIRIDE 4 MG/1
4 TABLET ORAL
COMMUNITY
End: 2021-06-23 | Stop reason: SDUPTHER

## 2021-05-04 RX ORDER — ESOMEPRAZOLE MAGNESIUM 40 MG/1
40 CAPSULE, DELAYED RELEASE ORAL
COMMUNITY
End: 2021-06-23 | Stop reason: SDUPTHER

## 2021-05-04 RX ORDER — ATORVASTATIN CALCIUM 10 MG/1
10 TABLET, FILM COATED ORAL DAILY
COMMUNITY
End: 2021-06-23 | Stop reason: SDUPTHER

## 2021-05-04 RX ORDER — FOLIC ACID 1 MG/1
1 TABLET ORAL DAILY
COMMUNITY
End: 2022-10-17 | Stop reason: SDUPTHER

## 2021-05-04 RX ORDER — METFORMIN HYDROCHLORIDE 500 MG/1
500 TABLET ORAL 2 TIMES DAILY WITH MEALS
COMMUNITY
End: 2021-06-23 | Stop reason: SDUPTHER

## 2021-05-04 RX ORDER — DICLOFENAC SODIUM 10 MG/G
2 GEL TOPICAL 4 TIMES DAILY
COMMUNITY
End: 2022-10-17 | Stop reason: SDUPTHER

## 2021-05-04 RX ORDER — METOPROLOL TARTRATE 50 MG/1
50 TABLET ORAL 2 TIMES DAILY
COMMUNITY
End: 2021-06-23 | Stop reason: SDUPTHER

## 2021-05-04 RX ORDER — HYDROCHLOROTHIAZIDE 25 MG/1
25 TABLET ORAL DAILY
COMMUNITY
End: 2021-10-29 | Stop reason: SDUPTHER

## 2021-05-05 ENCOUNTER — OFFICE VISIT (OUTPATIENT)
Dept: PAIN MEDICINE | Facility: CLINIC | Age: 51
End: 2021-05-05
Payer: MEDICARE

## 2021-05-05 VITALS
HEART RATE: 75 BPM | HEIGHT: 69 IN | SYSTOLIC BLOOD PRESSURE: 124 MMHG | RESPIRATION RATE: 19 BRPM | DIASTOLIC BLOOD PRESSURE: 77 MMHG | BODY MASS INDEX: 42.18 KG/M2 | WEIGHT: 284.81 LBS

## 2021-05-05 DIAGNOSIS — M47.817 LUMBOSACRAL SPONDYLOSIS WITHOUT MYELOPATHY: Chronic | ICD-10-CM

## 2021-05-05 DIAGNOSIS — G89.4 CHRONIC PAIN SYNDROME: Chronic | ICD-10-CM

## 2021-05-05 DIAGNOSIS — M05.79 RHEUMATOID ARTHRITIS INVOLVING MULTIPLE SITES WITH POSITIVE RHEUMATOID FACTOR: Primary | Chronic | ICD-10-CM

## 2021-05-05 PROCEDURE — 99214 OFFICE O/P EST MOD 30 MIN: CPT | Mod: S$PBB,,, | Performed by: PHYSICIAN ASSISTANT

## 2021-05-05 PROCEDURE — 99215 OFFICE O/P EST HI 40 MIN: CPT | Mod: PBBFAC | Performed by: PHYSICIAN ASSISTANT

## 2021-05-05 PROCEDURE — 99214 PR OFFICE/OUTPT VISIT, EST, LEVL IV, 30-39 MIN: ICD-10-PCS | Mod: S$PBB,,, | Performed by: PHYSICIAN ASSISTANT

## 2021-05-05 PROCEDURE — 99215 HC OFFICE/OUTPT VISIT, EST, LEVL V, 40-54 MIN: ICD-10-PCS | Mod: PBBFAC,,, | Performed by: PHYSICIAN ASSISTANT

## 2021-05-05 PROCEDURE — 99215 OFFICE O/P EST HI 40 MIN: CPT | Mod: PBBFAC,,, | Performed by: PHYSICIAN ASSISTANT

## 2021-05-05 RX ORDER — ETANERCEPT 50 MG/ML
50 SOLUTION SUBCUTANEOUS
COMMUNITY
Start: 2021-04-21

## 2021-05-05 RX ORDER — DICLOFENAC SODIUM 10 MG/G
2 GEL TOPICAL 4 TIMES DAILY
Qty: 10 TUBE | Refills: 2 | Status: SHIPPED | OUTPATIENT
Start: 2021-05-05 | End: 2021-06-04

## 2021-05-05 RX ORDER — HYDROCODONE BITARTRATE AND ACETAMINOPHEN 10; 325 MG/1; MG/1
1 TABLET ORAL EVERY 12 HOURS
Qty: 60 TABLET | Refills: 0 | Status: SHIPPED | OUTPATIENT
Start: 2021-05-05 | End: 2021-06-04

## 2021-06-07 ENCOUNTER — OFFICE VISIT (OUTPATIENT)
Dept: PAIN MEDICINE | Facility: CLINIC | Age: 51
End: 2021-06-07
Payer: MEDICARE

## 2021-06-07 VITALS
BODY MASS INDEX: 42.51 KG/M2 | WEIGHT: 287 LBS | RESPIRATION RATE: 17 BRPM | SYSTOLIC BLOOD PRESSURE: 144 MMHG | HEIGHT: 69 IN | DIASTOLIC BLOOD PRESSURE: 72 MMHG | HEART RATE: 63 BPM

## 2021-06-07 DIAGNOSIS — M05.79 RHEUMATOID ARTHRITIS INVOLVING MULTIPLE SITES WITH POSITIVE RHEUMATOID FACTOR: Chronic | ICD-10-CM

## 2021-06-07 DIAGNOSIS — Z79.899 ENCOUNTER FOR LONG-TERM (CURRENT) USE OF OTHER MEDICATIONS: Primary | ICD-10-CM

## 2021-06-07 DIAGNOSIS — M54.2 CERVICALGIA: Chronic | ICD-10-CM

## 2021-06-07 DIAGNOSIS — G89.4 CHRONIC PAIN SYNDROME: Chronic | ICD-10-CM

## 2021-06-07 DIAGNOSIS — M79.18 MYOFASCIAL PAIN: Chronic | ICD-10-CM

## 2021-06-07 LAB
CTP QC/QA: YES
POC (AMP) AMPHETAMINE: NEGATIVE
POC (BAR) BARBITURATES: NEGATIVE
POC (BUP) BUPRENORPHINE: NEGATIVE
POC (BZO) BENZODIAZEPINES: NEGATIVE
POC (COC) COCAINE: NEGATIVE
POC (MDMA) METHYLENEDIOXYMETHAMPHETAMINE 3,4: NEGATIVE
POC (MET) METHAMPHETAMINE: NEGATIVE
POC (MOP) OPIATES: ABNORMAL
POC (MTD) METHADONE: NEGATIVE
POC (OXY) OXYCODONE: NEGATIVE
POC (PCP) PHENCYCLIDINE: NEGATIVE
POC (TCA) TRICYCLIC ANTIDEPRESSANTS: NEGATIVE
POC TEMPERATURE (URINE): 94
POC THC: NEGATIVE

## 2021-06-07 PROCEDURE — 99214 PR OFFICE/OUTPT VISIT, EST, LEVL IV, 30-39 MIN: ICD-10-PCS | Mod: S$PBB,25,, | Performed by: PAIN MEDICINE

## 2021-06-07 PROCEDURE — 96372 THER/PROPH/DIAG INJ SC/IM: CPT | Mod: PBBFAC | Performed by: PAIN MEDICINE

## 2021-06-07 PROCEDURE — 80305 DRUG TEST PRSMV DIR OPT OBS: CPT | Mod: PBBFAC | Performed by: PAIN MEDICINE

## 2021-06-07 PROCEDURE — 99215 OFFICE O/P EST HI 40 MIN: CPT | Mod: PBBFAC,25 | Performed by: PAIN MEDICINE

## 2021-06-07 PROCEDURE — 99214 OFFICE O/P EST MOD 30 MIN: CPT | Mod: S$PBB,25,, | Performed by: PAIN MEDICINE

## 2021-06-07 RX ORDER — HYDROCODONE BITARTRATE AND ACETAMINOPHEN 10; 325 MG/1; MG/1
1 TABLET ORAL EVERY 12 HOURS PRN
Qty: 60 TABLET | Refills: 0 | Status: SHIPPED | OUTPATIENT
Start: 2021-07-07 | End: 2021-08-06

## 2021-06-07 RX ORDER — HYDROCODONE BITARTRATE AND ACETAMINOPHEN 10; 325 MG/1; MG/1
1 TABLET ORAL EVERY 12 HOURS PRN
COMMUNITY
End: 2021-08-05 | Stop reason: SDUPTHER

## 2021-06-07 RX ORDER — HYDROCODONE BITARTRATE AND ACETAMINOPHEN 10; 325 MG/1; MG/1
1 TABLET ORAL EVERY 12 HOURS PRN
Qty: 60 TABLET | Refills: 0 | Status: SHIPPED | OUTPATIENT
Start: 2021-06-07 | End: 2021-07-07

## 2021-06-07 RX ORDER — KETOROLAC TROMETHAMINE 30 MG/ML
60 INJECTION, SOLUTION INTRAMUSCULAR; INTRAVENOUS
Status: COMPLETED | OUTPATIENT
Start: 2021-06-07 | End: 2021-06-07

## 2021-06-07 RX ADMIN — KETOROLAC TROMETHAMINE 60 MG: 30 INJECTION, SOLUTION INTRAMUSCULAR at 03:06

## 2021-06-23 DIAGNOSIS — I10 HYPERTENSION, UNSPECIFIED TYPE: ICD-10-CM

## 2021-06-23 DIAGNOSIS — E11.9 TYPE 2 DIABETES MELLITUS WITHOUT COMPLICATION, WITHOUT LONG-TERM CURRENT USE OF INSULIN: Primary | ICD-10-CM

## 2021-06-23 DIAGNOSIS — E78.5 HYPERLIPIDEMIA, UNSPECIFIED HYPERLIPIDEMIA TYPE: ICD-10-CM

## 2021-06-23 DIAGNOSIS — K21.9 GASTROESOPHAGEAL REFLUX DISEASE WITHOUT ESOPHAGITIS: ICD-10-CM

## 2021-06-24 RX ORDER — POTASSIUM CHLORIDE 20 MEQ/1
20 TABLET, EXTENDED RELEASE ORAL 2 TIMES DAILY
Qty: 180 TABLET | Refills: 3 | Status: SHIPPED | OUTPATIENT
Start: 2021-06-24 | End: 2022-06-14

## 2021-06-24 RX ORDER — ATORVASTATIN CALCIUM 10 MG/1
10 TABLET, FILM COATED ORAL DAILY
Qty: 90 TABLET | Refills: 3 | Status: SHIPPED | OUTPATIENT
Start: 2021-06-24 | End: 2022-07-05

## 2021-06-24 RX ORDER — ESOMEPRAZOLE MAGNESIUM 40 MG/1
40 CAPSULE, DELAYED RELEASE ORAL
Qty: 90 CAPSULE | Refills: 3 | Status: SHIPPED | OUTPATIENT
Start: 2021-06-24 | End: 2022-04-13

## 2021-06-24 RX ORDER — GLIMEPIRIDE 4 MG/1
4 TABLET ORAL
Qty: 90 TABLET | Refills: 3 | Status: SHIPPED | OUTPATIENT
Start: 2021-06-24 | End: 2021-12-01 | Stop reason: SDUPTHER

## 2021-06-24 RX ORDER — METOPROLOL TARTRATE 50 MG/1
50 TABLET ORAL 2 TIMES DAILY
Qty: 180 TABLET | Refills: 3 | Status: SHIPPED | OUTPATIENT
Start: 2021-06-24 | End: 2022-06-14

## 2021-06-24 RX ORDER — METFORMIN HYDROCHLORIDE 500 MG/1
500 TABLET ORAL 2 TIMES DAILY WITH MEALS
Qty: 90 TABLET | Refills: 3 | Status: SHIPPED | OUTPATIENT
Start: 2021-06-24 | End: 2021-11-18

## 2021-08-05 ENCOUNTER — OFFICE VISIT (OUTPATIENT)
Dept: PAIN MEDICINE | Facility: CLINIC | Age: 51
End: 2021-08-05
Payer: MEDICARE

## 2021-08-05 VITALS
HEIGHT: 70 IN | BODY MASS INDEX: 40.52 KG/M2 | HEART RATE: 61 BPM | SYSTOLIC BLOOD PRESSURE: 162 MMHG | DIASTOLIC BLOOD PRESSURE: 86 MMHG | WEIGHT: 283 LBS | RESPIRATION RATE: 16 BRPM

## 2021-08-05 DIAGNOSIS — M05.79 RHEUMATOID ARTHRITIS INVOLVING MULTIPLE SITES WITH POSITIVE RHEUMATOID FACTOR: Chronic | ICD-10-CM

## 2021-08-05 DIAGNOSIS — M47.817 LUMBOSACRAL SPONDYLOSIS WITHOUT MYELOPATHY: Primary | Chronic | ICD-10-CM

## 2021-08-05 PROCEDURE — 3008F BODY MASS INDEX DOCD: CPT | Mod: CPTII,,, | Performed by: PHYSICIAN ASSISTANT

## 2021-08-05 PROCEDURE — 3079F DIAST BP 80-89 MM HG: CPT | Mod: CPTII,,, | Performed by: PHYSICIAN ASSISTANT

## 2021-08-05 PROCEDURE — 3079F PR MOST RECENT DIASTOLIC BLOOD PRESSURE 80-89 MM HG: ICD-10-PCS | Mod: CPTII,,, | Performed by: PHYSICIAN ASSISTANT

## 2021-08-05 PROCEDURE — 96372 THER/PROPH/DIAG INJ SC/IM: CPT | Mod: PBBFAC | Performed by: PHYSICIAN ASSISTANT

## 2021-08-05 PROCEDURE — 1125F AMNT PAIN NOTED PAIN PRSNT: CPT | Mod: CPTII,,, | Performed by: PHYSICIAN ASSISTANT

## 2021-08-05 PROCEDURE — 99214 PR OFFICE/OUTPT VISIT, EST, LEVL IV, 30-39 MIN: ICD-10-PCS | Mod: S$PBB,25,, | Performed by: PHYSICIAN ASSISTANT

## 2021-08-05 PROCEDURE — 3077F PR MOST RECENT SYSTOLIC BLOOD PRESSURE >= 140 MM HG: ICD-10-PCS | Mod: CPTII,,, | Performed by: PHYSICIAN ASSISTANT

## 2021-08-05 PROCEDURE — 99213 OFFICE O/P EST LOW 20 MIN: CPT | Mod: PBBFAC | Performed by: PHYSICIAN ASSISTANT

## 2021-08-05 PROCEDURE — 1125F PR PAIN SEVERITY QUANTIFIED, PAIN PRESENT: ICD-10-PCS | Mod: CPTII,,, | Performed by: PHYSICIAN ASSISTANT

## 2021-08-05 PROCEDURE — 99214 OFFICE O/P EST MOD 30 MIN: CPT | Mod: S$PBB,25,, | Performed by: PHYSICIAN ASSISTANT

## 2021-08-05 PROCEDURE — 3077F SYST BP >= 140 MM HG: CPT | Mod: CPTII,,, | Performed by: PHYSICIAN ASSISTANT

## 2021-08-05 PROCEDURE — 3008F PR BODY MASS INDEX (BMI) DOCUMENTED: ICD-10-PCS | Mod: CPTII,,, | Performed by: PHYSICIAN ASSISTANT

## 2021-08-05 RX ORDER — KETOROLAC TROMETHAMINE 30 MG/ML
60 INJECTION, SOLUTION INTRAMUSCULAR; INTRAVENOUS
Status: COMPLETED | OUTPATIENT
Start: 2021-08-05 | End: 2021-08-05

## 2021-08-05 RX ORDER — HYDROCODONE BITARTRATE AND ACETAMINOPHEN 10; 325 MG/1; MG/1
1 TABLET ORAL EVERY 12 HOURS
Qty: 60 TABLET | Refills: 0 | Status: SHIPPED | OUTPATIENT
Start: 2021-08-07 | End: 2021-09-03 | Stop reason: SDUPTHER

## 2021-08-05 RX ADMIN — KETOROLAC TROMETHAMINE 60 MG: 30 INJECTION, SOLUTION INTRAMUSCULAR; INTRAVENOUS at 03:08

## 2021-09-07 ENCOUNTER — OFFICE VISIT (OUTPATIENT)
Dept: PAIN MEDICINE | Facility: CLINIC | Age: 51
End: 2021-09-07
Payer: MEDICARE

## 2021-09-07 VITALS
SYSTOLIC BLOOD PRESSURE: 137 MMHG | WEIGHT: 286 LBS | BODY MASS INDEX: 42.36 KG/M2 | HEIGHT: 69 IN | HEART RATE: 68 BPM | DIASTOLIC BLOOD PRESSURE: 70 MMHG

## 2021-09-07 DIAGNOSIS — M05.79 RHEUMATOID ARTHRITIS INVOLVING MULTIPLE SITES WITH POSITIVE RHEUMATOID FACTOR: Primary | Chronic | ICD-10-CM

## 2021-09-07 DIAGNOSIS — M47.817 LUMBOSACRAL SPONDYLOSIS WITHOUT MYELOPATHY: Chronic | ICD-10-CM

## 2021-09-07 DIAGNOSIS — Z79.899 ENCOUNTER FOR LONG-TERM (CURRENT) USE OF OTHER MEDICATIONS: ICD-10-CM

## 2021-09-07 PROCEDURE — 3008F BODY MASS INDEX DOCD: CPT | Mod: CPTII,,, | Performed by: PHYSICIAN ASSISTANT

## 2021-09-07 PROCEDURE — 3078F DIAST BP <80 MM HG: CPT | Mod: CPTII,,, | Performed by: PHYSICIAN ASSISTANT

## 2021-09-07 PROCEDURE — 99214 OFFICE O/P EST MOD 30 MIN: CPT | Mod: S$PBB,,, | Performed by: PHYSICIAN ASSISTANT

## 2021-09-07 PROCEDURE — 99214 OFFICE O/P EST MOD 30 MIN: CPT | Mod: PBBFAC | Performed by: PHYSICIAN ASSISTANT

## 2021-09-07 PROCEDURE — 3078F PR MOST RECENT DIASTOLIC BLOOD PRESSURE < 80 MM HG: ICD-10-PCS | Mod: CPTII,,, | Performed by: PHYSICIAN ASSISTANT

## 2021-09-07 PROCEDURE — 3008F PR BODY MASS INDEX (BMI) DOCUMENTED: ICD-10-PCS | Mod: CPTII,,, | Performed by: PHYSICIAN ASSISTANT

## 2021-09-07 PROCEDURE — 3075F PR MOST RECENT SYSTOLIC BLOOD PRESS GE 130-139MM HG: ICD-10-PCS | Mod: CPTII,,, | Performed by: PHYSICIAN ASSISTANT

## 2021-09-07 PROCEDURE — 80305 DRUG TEST PRSMV DIR OPT OBS: CPT | Mod: PBBFAC | Performed by: PHYSICIAN ASSISTANT

## 2021-09-07 PROCEDURE — 1159F PR MEDICATION LIST DOCUMENTED IN MEDICAL RECORD: ICD-10-PCS | Mod: CPTII,,, | Performed by: PHYSICIAN ASSISTANT

## 2021-09-07 PROCEDURE — 3075F SYST BP GE 130 - 139MM HG: CPT | Mod: CPTII,,, | Performed by: PHYSICIAN ASSISTANT

## 2021-09-07 PROCEDURE — 99214 PR OFFICE/OUTPT VISIT, EST, LEVL IV, 30-39 MIN: ICD-10-PCS | Mod: S$PBB,,, | Performed by: PHYSICIAN ASSISTANT

## 2021-09-07 PROCEDURE — 1159F MED LIST DOCD IN RCRD: CPT | Mod: CPTII,,, | Performed by: PHYSICIAN ASSISTANT

## 2021-09-07 RX ORDER — HYDROCODONE BITARTRATE AND ACETAMINOPHEN 10; 325 MG/1; MG/1
1 TABLET ORAL EVERY 12 HOURS
Qty: 60 TABLET | Refills: 0 | Status: SHIPPED | OUTPATIENT
Start: 2021-09-07 | End: 2021-10-07

## 2021-09-07 RX ORDER — HYDROCODONE BITARTRATE AND ACETAMINOPHEN 10; 325 MG/1; MG/1
1 TABLET ORAL EVERY 12 HOURS
Qty: 60 TABLET | Refills: 0 | Status: SHIPPED | OUTPATIENT
Start: 2021-10-07 | End: 2021-11-03 | Stop reason: SDUPTHER

## 2021-10-05 ENCOUNTER — HOSPITAL ENCOUNTER (OUTPATIENT)
Dept: RADIOLOGY | Facility: HOSPITAL | Age: 51
Discharge: HOME | End: 2021-10-05
Payer: MEDICARE

## 2021-10-05 VITALS — WEIGHT: 286 LBS | HEIGHT: 69 IN | BODY MASS INDEX: 42.36 KG/M2

## 2021-10-05 DIAGNOSIS — Z12.31 VISIT FOR SCREENING MAMMOGRAM: ICD-10-CM

## 2021-10-05 PROCEDURE — 77067 SCR MAMMO BI INCL CAD: CPT | Mod: TC

## 2021-10-13 ENCOUNTER — OFFICE VISIT (OUTPATIENT)
Dept: OBSTETRICS AND GYNECOLOGY | Facility: CLINIC | Age: 51
End: 2021-10-13
Payer: COMMERCIAL

## 2021-10-13 ENCOUNTER — HOSPITAL ENCOUNTER (OUTPATIENT)
Dept: RADIOLOGY | Facility: HOSPITAL | Age: 51
Discharge: HOME | End: 2021-10-13
Attending: OBSTETRICS & GYNECOLOGY
Payer: COMMERCIAL

## 2021-10-13 VITALS
RESPIRATION RATE: 16 BRPM | SYSTOLIC BLOOD PRESSURE: 108 MMHG | DIASTOLIC BLOOD PRESSURE: 66 MMHG | BODY MASS INDEX: 30.93 KG/M2 | HEIGHT: 66 IN | WEIGHT: 192.44 LBS

## 2021-10-13 DIAGNOSIS — Z01.419 ENCOUNTER FOR ROUTINE GYNECOLOGICAL EXAMINATION WITH PAPANICOLAOU SMEAR OF CERVIX: Primary | ICD-10-CM

## 2021-10-13 DIAGNOSIS — Z12.31 SCREENING MAMMOGRAM, ENCOUNTER FOR: ICD-10-CM

## 2021-10-13 PROCEDURE — 99999 PR PBB SHADOW E&M-EST. PATIENT-LVL III: ICD-10-PCS | Mod: PBBFAC,,, | Performed by: OBSTETRICS & GYNECOLOGY

## 2021-10-13 PROCEDURE — 99396 PR PREVENTIVE VISIT,EST,40-64: ICD-10-PCS | Mod: S$GLB,,, | Performed by: OBSTETRICS & GYNECOLOGY

## 2021-10-13 PROCEDURE — 77067 MAMMO DIGITAL SCREENING BILAT WITH TOMO: ICD-10-PCS | Mod: 26,,, | Performed by: RADIOLOGY

## 2021-10-13 PROCEDURE — 99999 PR PBB SHADOW E&M-EST. PATIENT-LVL III: CPT | Mod: PBBFAC,,, | Performed by: OBSTETRICS & GYNECOLOGY

## 2021-10-13 PROCEDURE — 99396 PREV VISIT EST AGE 40-64: CPT | Mod: S$GLB,,, | Performed by: OBSTETRICS & GYNECOLOGY

## 2021-10-13 PROCEDURE — 77063 BREAST TOMOSYNTHESIS BI: CPT | Mod: 26,,, | Performed by: RADIOLOGY

## 2021-10-13 PROCEDURE — 88175 CYTOPATH C/V AUTO FLUID REDO: CPT | Performed by: OBSTETRICS & GYNECOLOGY

## 2021-10-13 PROCEDURE — 77063 MAMMO DIGITAL SCREENING BILAT WITH TOMO: ICD-10-PCS | Mod: 26,,, | Performed by: RADIOLOGY

## 2021-10-13 PROCEDURE — 77067 SCR MAMMO BI INCL CAD: CPT | Mod: 26,,, | Performed by: RADIOLOGY

## 2021-10-13 PROCEDURE — 77067 SCR MAMMO BI INCL CAD: CPT | Mod: TC,PN

## 2021-10-13 RX ORDER — NORETHINDRONE ACETATE AND ETHINYL ESTRADIOL .02; 1 MG/1; MG/1
1 TABLET ORAL DAILY
Qty: 30 TABLET | Refills: 11 | Status: SHIPPED | OUTPATIENT
Start: 2021-10-13 | End: 2022-05-26 | Stop reason: SDUPTHER

## 2021-10-22 LAB
CYTOLOGIST CVX/VAG CYTO: ABNORMAL
CYTOLOGY CVX/VAG DOC CYTO: ABNORMAL
CYTOLOGY CVX/VAG DOC THIN PREP: ABNORMAL
CYTOLOGY THINPREP PAP COMMENT: ABNORMAL
CYTOLOGY THINPREP PAP COMMENT: ABNORMAL
DX ICD CODE: ABNORMAL
PAP NOTE: ABNORMAL
PATHOLOGIST CVX/VAG CYTO: ABNORMAL
STAT OF ADQ CVX/VAG CYTO-IMP: ABNORMAL

## 2021-10-26 ENCOUNTER — HOSPITAL ENCOUNTER (OUTPATIENT)
Dept: RADIOLOGY | Facility: HOSPITAL | Age: 51
Discharge: HOME | End: 2021-10-26
Attending: RADIOLOGY
Payer: MEDICARE

## 2021-10-26 ENCOUNTER — HOSPITAL ENCOUNTER (OUTPATIENT)
Dept: RADIOLOGY | Facility: HOSPITAL | Age: 51
Discharge: HOME | End: 2021-10-26
Attending: STUDENT IN AN ORGANIZED HEALTH CARE EDUCATION/TRAINING PROGRAM
Payer: MEDICARE

## 2021-10-26 DIAGNOSIS — R92.8 ABNORMAL FINDING ON BREAST IMAGING: ICD-10-CM

## 2021-10-26 PROCEDURE — 27201044 US BREAST BIOPSY WITH IMAGING 1ST SITE LEFT

## 2021-10-26 PROCEDURE — 88305 SURGICAL PATHOLOGY: ICD-10-PCS | Mod: 26,,, | Performed by: PATHOLOGY

## 2021-10-26 PROCEDURE — 88342 IMHCHEM/IMCYTCHM 1ST ANTB: CPT | Mod: 26,,, | Performed by: PATHOLOGY

## 2021-10-26 PROCEDURE — 88341 IMHCHEM/IMCYTCHM EA ADD ANTB: CPT | Mod: 26,,, | Performed by: PATHOLOGY

## 2021-10-26 PROCEDURE — 88342 SURGICAL PATHOLOGY: ICD-10-PCS | Mod: 26,,, | Performed by: PATHOLOGY

## 2021-10-26 PROCEDURE — 88305 TISSUE EXAM BY PATHOLOGIST: CPT | Mod: SUR | Performed by: STUDENT IN AN ORGANIZED HEALTH CARE EDUCATION/TRAINING PROGRAM

## 2021-10-26 PROCEDURE — 88341 SURGICAL PATHOLOGY: ICD-10-PCS | Mod: 26,,, | Performed by: PATHOLOGY

## 2021-10-26 PROCEDURE — 88305 TISSUE EXAM BY PATHOLOGIST: CPT | Mod: 26,,, | Performed by: PATHOLOGY

## 2021-10-26 PROCEDURE — 76642 ULTRASOUND BREAST LIMITED: CPT | Mod: TC,LT

## 2021-10-26 PROCEDURE — 77065 DX MAMMO INCL CAD UNI: CPT | Mod: TC,LT

## 2021-10-26 RX ORDER — LIDOCAINE HYDROCHLORIDE AND EPINEPHRINE 10; 10 MG/ML; UG/ML
10 INJECTION, SOLUTION INFILTRATION; PERINEURAL ONCE
Status: DISCONTINUED | OUTPATIENT
Start: 2021-10-26 | End: 2021-10-27 | Stop reason: HOSPADM

## 2021-10-26 RX ORDER — LIDOCAINE HYDROCHLORIDE 10 MG/ML
5 INJECTION INFILTRATION; PERINEURAL ONCE
Status: DISCONTINUED | OUTPATIENT
Start: 2021-10-26 | End: 2021-10-27 | Stop reason: HOSPADM

## 2021-10-27 ENCOUNTER — TELEPHONE (OUTPATIENT)
Dept: OBSTETRICS AND GYNECOLOGY | Facility: CLINIC | Age: 51
End: 2021-10-27
Payer: COMMERCIAL

## 2021-10-28 ENCOUNTER — TELEPHONE (OUTPATIENT)
Dept: RADIOLOGY | Facility: HOSPITAL | Age: 51
End: 2021-10-28

## 2021-10-28 LAB
DHEA SERPL-MCNC: NORMAL
ESTROGEN SERPL-MCNC: NORMAL PG/ML
LAB AP CLINICAL INFORMATION: NORMAL
LAB AP GROSS DESCRIPTION: NORMAL
LAB AP LABORATORY NOTES: NORMAL
T3RU NFR SERPL: NORMAL %

## 2021-10-29 DIAGNOSIS — I10 PRIMARY HYPERTENSION: Primary | ICD-10-CM

## 2021-11-01 RX ORDER — HYDROCHLOROTHIAZIDE 25 MG/1
25 TABLET ORAL DAILY
Qty: 30 TABLET | Refills: 3 | Status: SHIPPED | OUTPATIENT
Start: 2021-11-01 | End: 2021-12-01 | Stop reason: SDUPTHER

## 2021-11-04 ENCOUNTER — OFFICE VISIT (OUTPATIENT)
Dept: PAIN MEDICINE | Facility: CLINIC | Age: 51
End: 2021-11-04
Payer: MEDICARE

## 2021-11-04 VITALS
BODY MASS INDEX: 43.4 KG/M2 | HEIGHT: 69 IN | SYSTOLIC BLOOD PRESSURE: 192 MMHG | RESPIRATION RATE: 20 BRPM | DIASTOLIC BLOOD PRESSURE: 80 MMHG | WEIGHT: 293 LBS | HEART RATE: 57 BPM

## 2021-11-04 DIAGNOSIS — M05.79 RHEUMATOID ARTHRITIS INVOLVING MULTIPLE SITES WITH POSITIVE RHEUMATOID FACTOR: Primary | Chronic | ICD-10-CM

## 2021-11-04 DIAGNOSIS — M47.817 LUMBOSACRAL SPONDYLOSIS WITHOUT MYELOPATHY: Chronic | ICD-10-CM

## 2021-11-04 PROCEDURE — 1159F MED LIST DOCD IN RCRD: CPT | Mod: CPTII,,, | Performed by: PHYSICIAN ASSISTANT

## 2021-11-04 PROCEDURE — 99214 OFFICE O/P EST MOD 30 MIN: CPT | Mod: PBBFAC,25 | Performed by: PHYSICIAN ASSISTANT

## 2021-11-04 PROCEDURE — 99214 OFFICE O/P EST MOD 30 MIN: CPT | Mod: S$PBB,25,, | Performed by: PHYSICIAN ASSISTANT

## 2021-11-04 PROCEDURE — 3077F PR MOST RECENT SYSTOLIC BLOOD PRESSURE >= 140 MM HG: ICD-10-PCS | Mod: CPTII,,, | Performed by: PHYSICIAN ASSISTANT

## 2021-11-04 PROCEDURE — 3008F PR BODY MASS INDEX (BMI) DOCUMENTED: ICD-10-PCS | Mod: CPTII,,, | Performed by: PHYSICIAN ASSISTANT

## 2021-11-04 PROCEDURE — 3079F PR MOST RECENT DIASTOLIC BLOOD PRESSURE 80-89 MM HG: ICD-10-PCS | Mod: CPTII,,, | Performed by: PHYSICIAN ASSISTANT

## 2021-11-04 PROCEDURE — 3008F BODY MASS INDEX DOCD: CPT | Mod: CPTII,,, | Performed by: PHYSICIAN ASSISTANT

## 2021-11-04 PROCEDURE — 3077F SYST BP >= 140 MM HG: CPT | Mod: CPTII,,, | Performed by: PHYSICIAN ASSISTANT

## 2021-11-04 PROCEDURE — 99214 PR OFFICE/OUTPT VISIT, EST, LEVL IV, 30-39 MIN: ICD-10-PCS | Mod: S$PBB,25,, | Performed by: PHYSICIAN ASSISTANT

## 2021-11-04 PROCEDURE — 3079F DIAST BP 80-89 MM HG: CPT | Mod: CPTII,,, | Performed by: PHYSICIAN ASSISTANT

## 2021-11-04 PROCEDURE — 96372 THER/PROPH/DIAG INJ SC/IM: CPT | Mod: PBBFAC | Performed by: PHYSICIAN ASSISTANT

## 2021-11-04 PROCEDURE — 1159F PR MEDICATION LIST DOCUMENTED IN MEDICAL RECORD: ICD-10-PCS | Mod: CPTII,,, | Performed by: PHYSICIAN ASSISTANT

## 2021-11-04 RX ORDER — KETOROLAC TROMETHAMINE 30 MG/ML
60 INJECTION, SOLUTION INTRAMUSCULAR; INTRAVENOUS
Status: COMPLETED | OUTPATIENT
Start: 2021-11-04 | End: 2021-11-04

## 2021-11-04 RX ORDER — HYDROCODONE BITARTRATE AND ACETAMINOPHEN 10; 325 MG/1; MG/1
1 TABLET ORAL EVERY 12 HOURS
Qty: 60 TABLET | Refills: 0 | Status: SHIPPED | OUTPATIENT
Start: 2021-11-06 | End: 2021-12-06 | Stop reason: SDUPTHER

## 2021-11-04 RX ADMIN — KETOROLAC TROMETHAMINE 60 MG: 30 INJECTION, SOLUTION INTRAMUSCULAR at 03:11

## 2021-11-15 ENCOUNTER — OFFICE VISIT (OUTPATIENT)
Dept: OBSTETRICS AND GYNECOLOGY | Facility: CLINIC | Age: 51
End: 2021-11-15
Payer: COMMERCIAL

## 2021-11-15 VITALS — WEIGHT: 194.88 LBS | BODY MASS INDEX: 31.46 KG/M2 | DIASTOLIC BLOOD PRESSURE: 70 MMHG | SYSTOLIC BLOOD PRESSURE: 98 MMHG

## 2021-11-15 DIAGNOSIS — Z01.812 PRE-PROCEDURE LAB EXAM: Primary | ICD-10-CM

## 2021-11-15 DIAGNOSIS — R87.612 LGSIL ON PAP SMEAR OF CERVIX: ICD-10-CM

## 2021-11-15 LAB
B-HCG UR QL: NEGATIVE
CTP QC/QA: YES

## 2021-11-15 PROCEDURE — 81025 POCT URINE PREGNANCY: ICD-10-PCS | Mod: S$GLB,,, | Performed by: OBSTETRICS & GYNECOLOGY

## 2021-11-15 PROCEDURE — 57456 PR COLPOSCOPY,CERVIX W/ADJ VAGINA, CURETTAG: ICD-10-PCS | Mod: S$GLB,,, | Performed by: OBSTETRICS & GYNECOLOGY

## 2021-11-15 PROCEDURE — 99499 NO LOS: ICD-10-PCS | Mod: S$GLB,,, | Performed by: OBSTETRICS & GYNECOLOGY

## 2021-11-15 PROCEDURE — 88305 TISSUE EXAM BY PATHOLOGIST: CPT | Performed by: PATHOLOGY

## 2021-11-15 PROCEDURE — 57456 ENDOCERV CURETTAGE W/SCOPE: CPT | Mod: S$GLB,,, | Performed by: OBSTETRICS & GYNECOLOGY

## 2021-11-15 PROCEDURE — 99999 PR PBB SHADOW E&M-EST. PATIENT-LVL III: CPT | Mod: PBBFAC,,, | Performed by: OBSTETRICS & GYNECOLOGY

## 2021-11-15 PROCEDURE — 99499 UNLISTED E&M SERVICE: CPT | Mod: S$GLB,,, | Performed by: OBSTETRICS & GYNECOLOGY

## 2021-11-15 PROCEDURE — 88305 TISSUE EXAM BY PATHOLOGIST: ICD-10-PCS | Mod: 26,,, | Performed by: PATHOLOGY

## 2021-11-15 PROCEDURE — 81025 URINE PREGNANCY TEST: CPT | Mod: S$GLB,,, | Performed by: OBSTETRICS & GYNECOLOGY

## 2021-11-15 PROCEDURE — 99999 PR PBB SHADOW E&M-EST. PATIENT-LVL III: ICD-10-PCS | Mod: PBBFAC,,, | Performed by: OBSTETRICS & GYNECOLOGY

## 2021-11-15 PROCEDURE — 88305 TISSUE EXAM BY PATHOLOGIST: CPT | Mod: 26,,, | Performed by: PATHOLOGY

## 2021-11-19 LAB
FINAL PATHOLOGIC DIAGNOSIS: NORMAL
Lab: NORMAL

## 2021-12-01 ENCOUNTER — APPOINTMENT (OUTPATIENT)
Dept: RADIOLOGY | Facility: CLINIC | Age: 51
End: 2021-12-01
Attending: FAMILY MEDICINE
Payer: MEDICARE

## 2021-12-01 ENCOUNTER — OFFICE VISIT (OUTPATIENT)
Dept: PRIMARY CARE CLINIC | Facility: CLINIC | Age: 51
End: 2021-12-01
Payer: MEDICARE

## 2021-12-01 VITALS
OXYGEN SATURATION: 97 % | HEIGHT: 69 IN | WEIGHT: 292 LBS | BODY MASS INDEX: 43.25 KG/M2 | TEMPERATURE: 97 F | HEART RATE: 71 BPM | SYSTOLIC BLOOD PRESSURE: 142 MMHG | DIASTOLIC BLOOD PRESSURE: 80 MMHG

## 2021-12-01 DIAGNOSIS — E11.9 TYPE 2 DIABETES MELLITUS WITHOUT COMPLICATION, WITHOUT LONG-TERM CURRENT USE OF INSULIN: ICD-10-CM

## 2021-12-01 DIAGNOSIS — M79.642 LEFT HAND PAIN: Primary | ICD-10-CM

## 2021-12-01 DIAGNOSIS — I10 PRIMARY HYPERTENSION: ICD-10-CM

## 2021-12-01 DIAGNOSIS — J32.9 SINUSITIS, UNSPECIFIED CHRONICITY, UNSPECIFIED LOCATION: ICD-10-CM

## 2021-12-01 DIAGNOSIS — M79.642 LEFT HAND PAIN: ICD-10-CM

## 2021-12-01 DIAGNOSIS — M05.79 RHEUMATOID ARTHRITIS INVOLVING MULTIPLE SITES WITH POSITIVE RHEUMATOID FACTOR: ICD-10-CM

## 2021-12-01 PROBLEM — M06.9 RHEUMATOID ARTHRITIS: Status: ACTIVE | Noted: 2021-12-01

## 2021-12-01 PROBLEM — Z79.899 OTHER LONG TERM (CURRENT) DRUG THERAPY: Status: ACTIVE | Noted: 2021-12-01

## 2021-12-01 PROCEDURE — 96372 THER/PROPH/DIAG INJ SC/IM: CPT | Mod: ,,, | Performed by: FAMILY MEDICINE

## 2021-12-01 PROCEDURE — 99213 PR OFFICE/OUTPT VISIT, EST, LEVL III, 20-29 MIN: ICD-10-PCS | Mod: 25,,, | Performed by: FAMILY MEDICINE

## 2021-12-01 PROCEDURE — 73130 X-RAY EXAM OF HAND: CPT | Mod: 26,LT,, | Performed by: RADIOLOGY

## 2021-12-01 PROCEDURE — 73130 X-RAY EXAM OF HAND: CPT | Mod: TC,RHCUB,LT | Performed by: FAMILY MEDICINE

## 2021-12-01 PROCEDURE — 90686 FLU VACCINE (QUAD) GREATER THAN OR EQUAL TO 3YO PRESERVATIVE FREE IM: ICD-10-PCS | Mod: ,,, | Performed by: FAMILY MEDICINE

## 2021-12-01 PROCEDURE — G0008 ADMIN INFLUENZA VIRUS VAC: HCPCS | Mod: ,,, | Performed by: FAMILY MEDICINE

## 2021-12-01 PROCEDURE — G0008 FLU VACCINE (QUAD) GREATER THAN OR EQUAL TO 3YO PRESERVATIVE FREE IM: ICD-10-PCS | Mod: ,,, | Performed by: FAMILY MEDICINE

## 2021-12-01 PROCEDURE — 99213 OFFICE O/P EST LOW 20 MIN: CPT | Mod: 25,,, | Performed by: FAMILY MEDICINE

## 2021-12-01 PROCEDURE — 90686 IIV4 VACC NO PRSV 0.5 ML IM: CPT | Mod: ,,, | Performed by: FAMILY MEDICINE

## 2021-12-01 PROCEDURE — 96372 PR INJECTION,THERAP/PROPH/DIAG2ST, IM OR SUBCUT: ICD-10-PCS | Mod: ,,, | Performed by: FAMILY MEDICINE

## 2021-12-01 PROCEDURE — 73130 XR HAND COMPLETE 3 VIEW LEFT: ICD-10-PCS | Mod: 26,LT,, | Performed by: RADIOLOGY

## 2021-12-01 RX ORDER — ESOMEPRAZOLE SODIUM 40 MG/5ML
INJECTION INTRAVENOUS
COMMUNITY
End: 2022-10-17 | Stop reason: SDUPTHER

## 2021-12-01 RX ORDER — LORATADINE 10 MG/1
10 TABLET ORAL DAILY
COMMUNITY
Start: 2021-09-16 | End: 2022-02-10 | Stop reason: SDUPTHER

## 2021-12-01 RX ORDER — MONTELUKAST SODIUM 10 MG/1
TABLET ORAL
COMMUNITY
Start: 2021-09-24 | End: 2022-10-17 | Stop reason: SDUPTHER

## 2021-12-01 RX ORDER — METHOTREXATE 2.5 MG/1
2.5 TABLET ORAL
COMMUNITY
Start: 2021-10-27

## 2021-12-01 RX ORDER — KETOROLAC TROMETHAMINE 30 MG/ML
60 INJECTION, SOLUTION INTRAMUSCULAR; INTRAVENOUS
Status: COMPLETED | OUTPATIENT
Start: 2021-12-01 | End: 2021-12-01

## 2021-12-01 RX ORDER — GLIPIZIDE 10 MG/1
TABLET ORAL
COMMUNITY
End: 2022-08-10 | Stop reason: SDUPTHER

## 2021-12-01 RX ORDER — ACETAMINOPHEN, DEXTROMETHORPHAN HBR, GUAIFENESIN, PSEUDOEPHEDRINE HCL 325; 20; 200; 60 MG/1; MG/1; MG/1; MG/1
1 TABLET ORAL
Qty: 30 TABLET | Refills: 2 | Status: SHIPPED | OUTPATIENT
Start: 2021-12-01 | End: 2023-02-28 | Stop reason: ALTCHOICE

## 2021-12-01 RX ORDER — CICLOPIROX 80 MG/ML
SOLUTION TOPICAL
COMMUNITY

## 2021-12-01 RX ORDER — TIZANIDINE 4 MG/1
TABLET ORAL
COMMUNITY
Start: 2021-07-06

## 2021-12-01 RX ORDER — DICLOFENAC SODIUM 10 MG/G
GEL TOPICAL
COMMUNITY
Start: 2021-07-06 | End: 2021-12-01 | Stop reason: SDUPTHER

## 2021-12-01 RX ORDER — GLIMEPIRIDE 4 MG/1
4 TABLET ORAL
Qty: 90 TABLET | Refills: 3 | Status: SHIPPED | OUTPATIENT
Start: 2021-12-01 | End: 2022-10-17 | Stop reason: SDUPTHER

## 2021-12-01 RX ORDER — HYDROCHLOROTHIAZIDE 25 MG/1
25 TABLET ORAL DAILY
Qty: 90 TABLET | Refills: 3 | Status: SHIPPED | OUTPATIENT
Start: 2021-12-01 | End: 2022-10-17 | Stop reason: SDUPTHER

## 2021-12-01 RX ORDER — OMEPRAZOLE 20 MG/1
CAPSULE, DELAYED RELEASE ORAL
COMMUNITY
Start: 2021-01-18 | End: 2022-10-17 | Stop reason: SDUPTHER

## 2021-12-01 RX ORDER — TRAMADOL HYDROCHLORIDE 50 MG/1
TABLET ORAL
COMMUNITY
End: 2023-02-27 | Stop reason: SDUPTHER

## 2021-12-01 RX ADMIN — KETOROLAC TROMETHAMINE 60 MG: 30 INJECTION, SOLUTION INTRAMUSCULAR; INTRAVENOUS at 03:12

## 2021-12-06 ENCOUNTER — OFFICE VISIT (OUTPATIENT)
Dept: PAIN MEDICINE | Facility: CLINIC | Age: 51
End: 2021-12-06
Payer: MEDICARE

## 2021-12-06 VITALS
SYSTOLIC BLOOD PRESSURE: 140 MMHG | WEIGHT: 292 LBS | HEART RATE: 69 BPM | BODY MASS INDEX: 43.25 KG/M2 | RESPIRATION RATE: 18 BRPM | HEIGHT: 69 IN | DIASTOLIC BLOOD PRESSURE: 77 MMHG

## 2021-12-06 DIAGNOSIS — M05.79 RHEUMATOID ARTHRITIS INVOLVING MULTIPLE SITES WITH POSITIVE RHEUMATOID FACTOR: Primary | Chronic | ICD-10-CM

## 2021-12-06 DIAGNOSIS — Z79.899 ENCOUNTER FOR LONG-TERM (CURRENT) USE OF OTHER MEDICATIONS: ICD-10-CM

## 2021-12-06 DIAGNOSIS — M47.817 LUMBOSACRAL SPONDYLOSIS WITHOUT MYELOPATHY: Chronic | ICD-10-CM

## 2021-12-06 LAB
CTP QC/QA: YES
POC (AMP) AMPHETAMINE: NEGATIVE
POC (BAR) BARBITURATES: NEGATIVE
POC (BUP) BUPRENORPHINE: NEGATIVE
POC (BZO) BENZODIAZEPINES: NEGATIVE
POC (COC) COCAINE: NEGATIVE
POC (MDMA) METHYLENEDIOXYMETHAMPHETAMINE 3,4: NEGATIVE
POC (MET) METHAMPHETAMINE: NEGATIVE
POC (MOP) OPIATES: ABNORMAL
POC (MTD) METHADONE: NEGATIVE
POC (OXY) OXYCODONE: NEGATIVE
POC (PCP) PHENCYCLIDINE: NEGATIVE
POC (TCA) TRICYCLIC ANTIDEPRESSANTS: NEGATIVE
POC TEMPERATURE (URINE): 92
POC THC: NEGATIVE

## 2021-12-06 PROCEDURE — 99214 OFFICE O/P EST MOD 30 MIN: CPT | Mod: S$PBB,,, | Performed by: PHYSICIAN ASSISTANT

## 2021-12-06 PROCEDURE — 99215 OFFICE O/P EST HI 40 MIN: CPT | Mod: PBBFAC | Performed by: PHYSICIAN ASSISTANT

## 2021-12-06 PROCEDURE — 80305 DRUG TEST PRSMV DIR OPT OBS: CPT | Mod: PBBFAC | Performed by: PHYSICIAN ASSISTANT

## 2021-12-06 PROCEDURE — 99214 PR OFFICE/OUTPT VISIT, EST, LEVL IV, 30-39 MIN: ICD-10-PCS | Mod: S$PBB,,, | Performed by: PHYSICIAN ASSISTANT

## 2021-12-06 RX ORDER — HYDROCODONE BITARTRATE AND ACETAMINOPHEN 10; 325 MG/1; MG/1
1 TABLET ORAL EVERY 12 HOURS
Qty: 60 TABLET | Refills: 0 | Status: SHIPPED | OUTPATIENT
Start: 2021-12-08 | End: 2022-01-07

## 2021-12-06 RX ORDER — HYDROCODONE BITARTRATE AND ACETAMINOPHEN 10; 325 MG/1; MG/1
1 TABLET ORAL EVERY 12 HOURS
Qty: 60 TABLET | Refills: 0 | Status: SHIPPED | OUTPATIENT
Start: 2022-01-07 | End: 2022-01-31 | Stop reason: SDUPTHER

## 2021-12-08 ENCOUNTER — TELEPHONE (OUTPATIENT)
Dept: PRIMARY CARE CLINIC | Facility: CLINIC | Age: 51
End: 2021-12-08
Payer: MEDICARE

## 2021-12-08 DIAGNOSIS — E11.9 TYPE 2 DIABETES MELLITUS WITHOUT COMPLICATION, WITHOUT LONG-TERM CURRENT USE OF INSULIN: Primary | ICD-10-CM

## 2021-12-15 ENCOUNTER — TELEPHONE (OUTPATIENT)
Dept: OBSTETRICS AND GYNECOLOGY | Facility: CLINIC | Age: 51
End: 2021-12-15
Payer: COMMERCIAL

## 2021-12-15 RX ORDER — NORETHINDRONE AND ETHINYL ESTRADIOL AND FERROUS FUMARATE 0.4-35(21)
1 KIT ORAL DAILY
Qty: 90 EACH | Refills: 0 | Status: SHIPPED | OUTPATIENT
Start: 2021-12-15 | End: 2024-03-06 | Stop reason: ALTCHOICE

## 2022-01-24 DIAGNOSIS — M62.838 MUSCLE SPASM: Primary | ICD-10-CM

## 2022-01-24 RX ORDER — CYCLOBENZAPRINE HCL 10 MG
10 TABLET ORAL 3 TIMES DAILY PRN
Qty: 90 TABLET | Refills: 2 | Status: SHIPPED | OUTPATIENT
Start: 2022-01-24 | End: 2022-10-17 | Stop reason: SDUPTHER

## 2022-01-24 NOTE — TELEPHONE ENCOUNTER
----- Message from Chantel Mayo sent at 1/24/2022 11:40 AM CST -----  Refill muscle relaxer - [facility]

## 2022-01-31 NOTE — PROGRESS NOTES
Disclaimer:  This note has been generated using voice recognition software.  There may be type of graft focal areas that have been missed during a proof reading      Subjective:      Patient ID: Natali Claire is a 51 y.o. female.    Chief Complaint: Shoulder Pain, Joint Pain, and Back Pain      Pain  This is a chronic problem. The current episode started more than 1 year ago. The problem occurs daily. The problem has been waxing and waning. Associated symptoms include arthralgias and neck pain. Pertinent negatives include no change in bowel habit, chest pain, chills, coughing, diaphoresis, fatigue, rash, sore throat, vertigo or vomiting.     Review of Systems   Constitutional: Negative for activity change, appetite change, chills, diaphoresis, fatigue and unexpected weight change.   HENT: Negative for drooling, ear discharge, ear pain, facial swelling, nosebleeds, sore throat, trouble swallowing, voice change and goiter.    Eyes: Negative for photophobia, pain, discharge, redness and visual disturbance.   Respiratory: Negative for apnea, cough, choking, chest tightness, shortness of breath, wheezing and stridor.    Cardiovascular: Negative for chest pain, palpitations and leg swelling.   Gastrointestinal: Negative for abdominal distention, change in bowel habit, diarrhea, rectal pain, vomiting, fecal incontinence and change in bowel habit.   Endocrine: Negative for cold intolerance, heat intolerance, polydipsia, polyphagia and polyuria.   Genitourinary: Negative for bladder incontinence, dysuria, flank pain, frequency and hot flashes.   Musculoskeletal: Positive for arthralgias, back pain, leg pain and neck pain.   Integumentary:  Negative for color change, pallor and rash.   Allergic/Immunologic: Negative for immunocompromised state.   Neurological: Negative for dizziness, vertigo, seizures, syncope, facial asymmetry, speech difficulty, light-headedness, disturbances in coordination, memory loss and coordination  "difficulties.   Hematological: Negative for adenopathy. Does not bruise/bleed easily.   Psychiatric/Behavioral: Negative for agitation, behavioral problems, confusion, decreased concentration, dysphoric mood, hallucinations, self-injury and suicidal ideas. The patient is not nervous/anxious and is not hyperactive.             Objective:  Vitals:    02/01/22 1427   BP: (!) 152/65   Pulse: 60   Weight: 134.7 kg (297 lb)   Height: 5' 9" (1.753 m)   PainSc:   4         Physical Exam  Vitals and nursing note reviewed. Exam conducted with a chaperone present.   Constitutional:       General: She is awake. She is not in acute distress.     Appearance: Normal appearance. She is not toxic-appearing.   HENT:      Head: Normocephalic and atraumatic.      Nose: Nose normal.      Mouth/Throat:      Mouth: Mucous membranes are moist.      Pharynx: Oropharynx is clear.   Eyes:      Conjunctiva/sclera: Conjunctivae normal.      Pupils: Pupils are equal, round, and reactive to light.   Cardiovascular:      Rate and Rhythm: Normal rate.   Pulmonary:      Effort: Pulmonary effort is normal. No respiratory distress.   Abdominal:      Palpations: Abdomen is soft.   Musculoskeletal:         General: Normal range of motion.      Cervical back: Normal range of motion and neck supple.   Skin:     General: Skin is warm and dry.   Neurological:      General: No focal deficit present.      Mental Status: She is alert and oriented to person, place, and time. Mental status is at baseline.      Cranial Nerves: Cranial nerves are intact. No cranial nerve deficit (II-XII).   Psychiatric:         Mood and Affect: Mood normal.         Behavior: Behavior normal. Behavior is cooperative.         Thought Content: Thought content normal.           No orders of the defined types were placed in this encounter.       X-Ray Hand 3 View Left  Narrative: EXAMINATION:  XR HAND COMPLETE 3 VIEW LEFT    CLINICAL HISTORY:  .  Pain in left hand    TECHNIQUE:  AP, " lateral, and oblique views left hand    COMPARISON:  No previous similar    FINDINGS:  Osseous structures are well mineralized.  There is no acute fracture or dislocation.  There is no focal destructive osseous abnormality  Impression: No acute bony abnormality    Electronically signed by: Geremias Prado  Date:    12/02/2021  Time:    06:14       Office Visit on 12/06/2021   Component Date Value Ref Range Status    POC Amphetamines 12/06/2021 Negative  Negative, Inconclusive Final    POC Barbiturates 12/06/2021 Negative  Negative, Inconclusive Final    POC Benzodiazepines 12/06/2021 Negative  Negative, Inconclusive Final    POC Cocaine 12/06/2021 Negative  Negative, Inconclusive Final    POC THC 12/06/2021 Negative  Negative, Inconclusive Final    POC Methadone 12/06/2021 Negative  Negative, Inconclusive Final    POC Methamphetamine 12/06/2021 Negative  Negative, Inconclusive Final    POC Opiates 12/06/2021 Presumptive Positive* Negative, Inconclusive Final    POC Oxycodone 12/06/2021 Negative  Negative, Inconclusive Final    POC Phencyclidine 12/06/2021 Negative  Negative, Inconclusive Final    POC Methylenedioxymethamphetamine * 12/06/2021 Negative  Negative, Inconclusive Final    POC Tricyclic Antidepressants 12/06/2021 Negative  Negative, Inconclusive Final    POC Buprenorphine 12/06/2021 Negative   Final     Acceptable 12/06/2021 Yes   Final    POC Temperature (Urine) 12/06/2021 92   Final   Hospital Outpatient Visit on 10/26/2021   Component Date Value Ref Range Status    Final Diagnosis 10/26/2021    Final                    Value:This result contains rich text formatting which cannot be displayed here.    Comments 10/26/2021    Final                    Value:This result contains rich text formatting which cannot be displayed here.    Gross Description 10/26/2021    Final                    Value:This result contains rich text formatting which cannot be displayed here.     Microscopic Description 10/26/2021    Final                    Value:This result contains rich text formatting which cannot be displayed here.    Clinical Information 10/26/2021    Final                    Value:This result contains rich text formatting which cannot be displayed here.    Laboratory Notes 10/26/2021    Final                    Value:This result contains rich text formatting which cannot be displayed here.   Office Visit on 09/07/2021   Component Date Value Ref Range Status    POC Amphetamines 09/07/2021 Negative  Negative, Inconclusive Final    POC Barbiturates 09/07/2021 Negative  Negative, Inconclusive Final    POC Benzodiazepines 09/07/2021 Negative  Negative, Inconclusive Final    POC Cocaine 09/07/2021 Negative  Negative, Inconclusive Final    POC THC 09/07/2021 Negative  Negative, Inconclusive Final    POC Methadone 09/07/2021 Negative  Negative, Inconclusive Final    POC Methamphetamine 09/07/2021 Negative  Negative, Inconclusive Final    POC Opiates 09/07/2021 Presumptive Positive* Negative, Inconclusive Final    POC Oxycodone 09/07/2021 Negative  Negative, Inconclusive Final    POC Phencyclidine 09/07/2021 Negative  Negative, Inconclusive Final    POC Methylenedioxymethamphetamine * 09/07/2021 Negative  Negative, Inconclusive Final    POC Tricyclic Antidepressants 09/07/2021 Negative  Negative, Inconclusive Final    POC Buprenorphine 09/07/2021 Negative   Final     Acceptable 09/07/2021 Yes   Final    POC Temperature (Urine) 09/07/2021 94   Final         Assessment:      1. Rheumatoid arthritis involving multiple sites with positive rheumatoid factor    2. Lumbosacral spondylosis without myelopathy            A's of Opioid Risk Assessment  Activity:Patient can perform ADL.   Analgesia:Patients pain is partially controlled by current medication. Patient has tried OTC medications such as Tylenol and Ibuprofen with out relief.   Adverse Effects: Patient denies  constipation or sedation.  Aberrant Behavior:  reviewed with no aberrant drug seeking/taking behavior.  Overdose reversal drug naloxone discussed    Drug screen reviewed      Requested Prescriptions     Signed Prescriptions Disp Refills    HYDROcodone-acetaminophen (NORCO)  mg per tablet 60 tablet 0     Sig: Take 1 tablet by mouth every 12 (twelve) hours.    HYDROcodone-acetaminophen (NORCO)  mg per tablet 60 tablet 0     Sig: Take 1 tablet by mouth every 12 (twelve) hours.         Plan:    Follows rheumatology rheumatoid arthritis    She states current medications helping control her discomfort    She continues to have some joint back pain    She would like to continue with conservative management    Continue home exercise program as directed    Continue current medication    Follow-up 2 months    Dr. Claire, June 2022    Bring original prescription medication bottles/container/box with labels to each visit

## 2022-02-01 ENCOUNTER — OFFICE VISIT (OUTPATIENT)
Dept: PAIN MEDICINE | Facility: CLINIC | Age: 52
End: 2022-02-01
Payer: MEDICARE

## 2022-02-01 VITALS
BODY MASS INDEX: 43.4 KG/M2 | SYSTOLIC BLOOD PRESSURE: 152 MMHG | HEIGHT: 69 IN | HEART RATE: 60 BPM | DIASTOLIC BLOOD PRESSURE: 65 MMHG | WEIGHT: 293 LBS

## 2022-02-01 DIAGNOSIS — M05.79 RHEUMATOID ARTHRITIS INVOLVING MULTIPLE SITES WITH POSITIVE RHEUMATOID FACTOR: Primary | Chronic | ICD-10-CM

## 2022-02-01 DIAGNOSIS — M47.817 LUMBOSACRAL SPONDYLOSIS WITHOUT MYELOPATHY: Chronic | ICD-10-CM

## 2022-02-01 PROCEDURE — 3008F BODY MASS INDEX DOCD: CPT | Mod: CPTII,,, | Performed by: PHYSICIAN ASSISTANT

## 2022-02-01 PROCEDURE — 99214 PR OFFICE/OUTPT VISIT, EST, LEVL IV, 30-39 MIN: ICD-10-PCS | Mod: S$PBB,,, | Performed by: PHYSICIAN ASSISTANT

## 2022-02-01 PROCEDURE — 1159F MED LIST DOCD IN RCRD: CPT | Mod: CPTII,,, | Performed by: PHYSICIAN ASSISTANT

## 2022-02-01 PROCEDURE — 99214 OFFICE O/P EST MOD 30 MIN: CPT | Mod: S$PBB,,, | Performed by: PHYSICIAN ASSISTANT

## 2022-02-01 PROCEDURE — 3077F SYST BP >= 140 MM HG: CPT | Mod: CPTII,,, | Performed by: PHYSICIAN ASSISTANT

## 2022-02-01 PROCEDURE — 3077F PR MOST RECENT SYSTOLIC BLOOD PRESSURE >= 140 MM HG: ICD-10-PCS | Mod: CPTII,,, | Performed by: PHYSICIAN ASSISTANT

## 2022-02-01 PROCEDURE — 99215 OFFICE O/P EST HI 40 MIN: CPT | Mod: PBBFAC | Performed by: PHYSICIAN ASSISTANT

## 2022-02-01 PROCEDURE — 1159F PR MEDICATION LIST DOCUMENTED IN MEDICAL RECORD: ICD-10-PCS | Mod: CPTII,,, | Performed by: PHYSICIAN ASSISTANT

## 2022-02-01 PROCEDURE — 3078F PR MOST RECENT DIASTOLIC BLOOD PRESSURE < 80 MM HG: ICD-10-PCS | Mod: CPTII,,, | Performed by: PHYSICIAN ASSISTANT

## 2022-02-01 PROCEDURE — 3078F DIAST BP <80 MM HG: CPT | Mod: CPTII,,, | Performed by: PHYSICIAN ASSISTANT

## 2022-02-01 PROCEDURE — 3008F PR BODY MASS INDEX (BMI) DOCUMENTED: ICD-10-PCS | Mod: CPTII,,, | Performed by: PHYSICIAN ASSISTANT

## 2022-02-01 RX ORDER — HYDROCODONE BITARTRATE AND ACETAMINOPHEN 10; 325 MG/1; MG/1
1 TABLET ORAL EVERY 12 HOURS
Qty: 60 TABLET | Refills: 0 | Status: SHIPPED | OUTPATIENT
Start: 2022-02-05 | End: 2022-03-07

## 2022-02-01 RX ORDER — HYDROCODONE BITARTRATE AND ACETAMINOPHEN 10; 325 MG/1; MG/1
1 TABLET ORAL EVERY 12 HOURS
Qty: 60 TABLET | Refills: 0 | Status: SHIPPED | OUTPATIENT
Start: 2022-03-08 | End: 2022-04-05 | Stop reason: SDUPTHER

## 2022-02-01 NOTE — PATIENT INSTRUCTIONS
Patient Education       Spondylolysis   About this topic   The spine is made up of bones called vertebrae. These bones are lined up on top of each other. The spinal bones have a large solid part called the body. There is also a danette arch that goes around your spinal cord. Spondylolysis is a tiny crack, or stress fracture, in this danette arch. This problem almost always happens in the lower back or the lumbar spine. This arch can crack on one or both sides. If it happens on both sides and it is not treated, it can lead to a more serious problem.     What are the causes?   · Too much stress on the bones in the spine. This often comes from playing sports like gymnastics, football, or weight-lifting.  · Having thin bones. This may be from birth.  · Trauma  · Degenerative problem  · Being overweight  What can make this more likely to happen?   · More common in children and teenagers  · Growth spurts  · Using poor techniques or improper equipment for sports or exercise  What are the main signs?   · Low back pain that is worse with activity and better with rest  · Pain in the buttocks, back of the thighs that may shoot down the leg  · Muscle spasms or tightness in the back or back of the thigh  · Some people have no signs  How does the doctor diagnose this health problem?   The doctor will do an exam and feel around your back. The doctor may have you move and push and pull on your legs to test your motion and strength. Your doctor may have you raise one leg straight up to check if the muscles in the back of your thigh are tight. Your doctor may check the feeling and reflexes in your legs to check for nerve problems. The doctor may order:  · X-ray  · CT or MRI scan  How does the doctor treat this health problem?   · Rest  · Back brace  · Exercises for strengthening and stretching  · Physical therapy (PT) for treatments to lessen pain and for instruction in exercises to help the problem  · Weight loss program if you are  overweight  · Surgery may be needed if pain does not get better or if other problems happen  What drugs may be needed?   The doctor may order drugs to:  · Help with pain and swelling  The doctor may give you a shot to help with pain and swelling. Talk with your doctor about the risks of this shot.   What problems could happen?   · A spinal bone could slip forward onto the spinal bone below. This is spondylolisthesis.  · Chronic back pain  · Nerve damage  · Need for surgery  What can be done to prevent this health problem?   · Stay active and work out to keep your muscles strong and flexible. Keep your back and belly muscles strong and your hamstring muscles flexible.  · Warm up slowly and stretch before you exercise. Use good training techniques and form for sports. Have an expert look at your technique.  · Wear the right equipment when playing sports.  · Use good ways to train, such as slowly adding to how many exercises you do.  · Take breaks often when doing things that use repeat movements.  · Do not exercise or play sports when you are tired or in pain.  · Use extra care in sports with a lot of back bending such as gymnastics, dancing, football, and wrestling.  · Eat a healthy diet to keep your muscles and bones healthy. Eat a diet rich in calcium and vitamin D to keep your bones strong.  · Keep a healthy weight so there is not extra stress on your joints.  Helpful tips   · If you have back pain, do not ignore it. Go to the doctor. The earlier this problem is treated, the better the results.  · Try swimming and biking to stay in shape. These activities put less stress on your back.  Where can I learn more?   [employer]  https://kidshealth.org/en/parents/spondylolysis.html#kha_12   Last Reviewed Date   2020-03-16  Consumer Information Use and Disclaimer   This information is not specific medical advice and does not replace information you receive from your health care provider. This is only a brief summary of  general information. It does NOT include all information about conditions, illnesses, injuries, tests, procedures, treatments, therapies, discharge instructions or life-style choices that may apply to you. You must talk with your health care provider for complete information about your health and treatment options. This information should not be used to decide whether or not to accept your health care providers advice, instructions or recommendations. Only your health care provider has the knowledge and training to provide advice that is right for you.  Copyright   Copyright © 2021 [employer] and its affiliates and/or licensors. All rights reserved.

## 2022-02-10 RX ORDER — LORATADINE 10 MG/1
10 TABLET ORAL DAILY
Qty: 90 TABLET | Refills: 3 | Status: SHIPPED | OUTPATIENT
Start: 2022-02-10 | End: 2022-10-17 | Stop reason: SDUPTHER

## 2022-02-10 NOTE — TELEPHONE ENCOUNTER
----- Message from Jacky Gibson sent at 2/10/2022  8:28 AM CST -----  Regarding: refill  [facility]

## 2022-03-03 RX ORDER — NORETHINDRONE AND ETHINYL ESTRADIOL AND FERROUS FUMARATE 0.4-35(21)
KIT ORAL
OUTPATIENT
Start: 2022-03-03

## 2022-03-03 NOTE — TELEPHONE ENCOUNTER
Spoke to pharmacy, pt unable to refill the chew birth control pt was too early, pharmacy had old monthly tablets on chart. Pharmacist deactivated old prescription so that pt would be only able to  her new birth control and not have any confusion. No refill/rx is needed at this time.

## 2022-03-03 NOTE — TELEPHONE ENCOUNTER
----- Message from Lorelei Siddiqui sent at 3/2/2022  9:57 AM CST -----  Regarding: advice  Contact: [phone]  Type: Needs Medical Advice  Who Called:  Patient/[phone] (home)       Pharmacy name and phone #:    [id number] - [place] - [address]  [address] [address]  Phone: [phone] Fax: [phone]       Additional Information: the doctor changed her birth control pills for her but when she went to pick them up, the pharmacy only had her old birth control pills. Please call to advise.

## 2022-04-05 ENCOUNTER — OFFICE VISIT (OUTPATIENT)
Dept: PAIN MEDICINE | Facility: CLINIC | Age: 52
End: 2022-04-05
Payer: MEDICARE

## 2022-04-05 VITALS
HEIGHT: 69 IN | WEIGHT: 293 LBS | SYSTOLIC BLOOD PRESSURE: 147 MMHG | HEART RATE: 59 BPM | BODY MASS INDEX: 43.4 KG/M2 | DIASTOLIC BLOOD PRESSURE: 76 MMHG | RESPIRATION RATE: 17 BRPM

## 2022-04-05 DIAGNOSIS — Z79.899 ENCOUNTER FOR LONG-TERM (CURRENT) USE OF OTHER MEDICATIONS: ICD-10-CM

## 2022-04-05 DIAGNOSIS — M47.817 LUMBOSACRAL SPONDYLOSIS WITHOUT MYELOPATHY: Chronic | ICD-10-CM

## 2022-04-05 DIAGNOSIS — M06.09 RHEUMATOID ARTHRITIS OF MULTIPLE SITES WITHOUT RHEUMATOID FACTOR: Primary | Chronic | ICD-10-CM

## 2022-04-05 PROCEDURE — 3077F SYST BP >= 140 MM HG: CPT | Mod: CPTII,,, | Performed by: PHYSICIAN ASSISTANT

## 2022-04-05 PROCEDURE — 3008F PR BODY MASS INDEX (BMI) DOCUMENTED: ICD-10-PCS | Mod: CPTII,,, | Performed by: PHYSICIAN ASSISTANT

## 2022-04-05 PROCEDURE — 3008F BODY MASS INDEX DOCD: CPT | Mod: CPTII,,, | Performed by: PHYSICIAN ASSISTANT

## 2022-04-05 PROCEDURE — 1159F MED LIST DOCD IN RCRD: CPT | Mod: CPTII,,, | Performed by: PHYSICIAN ASSISTANT

## 2022-04-05 PROCEDURE — 99214 OFFICE O/P EST MOD 30 MIN: CPT | Mod: S$PBB,,, | Performed by: PHYSICIAN ASSISTANT

## 2022-04-05 PROCEDURE — 3078F DIAST BP <80 MM HG: CPT | Mod: CPTII,,, | Performed by: PHYSICIAN ASSISTANT

## 2022-04-05 PROCEDURE — 3078F PR MOST RECENT DIASTOLIC BLOOD PRESSURE < 80 MM HG: ICD-10-PCS | Mod: CPTII,,, | Performed by: PHYSICIAN ASSISTANT

## 2022-04-05 PROCEDURE — 1159F PR MEDICATION LIST DOCUMENTED IN MEDICAL RECORD: ICD-10-PCS | Mod: CPTII,,, | Performed by: PHYSICIAN ASSISTANT

## 2022-04-05 PROCEDURE — 99214 PR OFFICE/OUTPT VISIT, EST, LEVL IV, 30-39 MIN: ICD-10-PCS | Mod: S$PBB,,, | Performed by: PHYSICIAN ASSISTANT

## 2022-04-05 PROCEDURE — 3077F PR MOST RECENT SYSTOLIC BLOOD PRESSURE >= 140 MM HG: ICD-10-PCS | Mod: CPTII,,, | Performed by: PHYSICIAN ASSISTANT

## 2022-04-05 PROCEDURE — 99215 OFFICE O/P EST HI 40 MIN: CPT | Mod: PBBFAC | Performed by: PHYSICIAN ASSISTANT

## 2022-04-05 PROCEDURE — 80305 DRUG TEST PRSMV DIR OPT OBS: CPT | Mod: PBBFAC | Performed by: PHYSICIAN ASSISTANT

## 2022-04-05 RX ORDER — HYDROCODONE BITARTRATE AND ACETAMINOPHEN 10; 325 MG/1; MG/1
1 TABLET ORAL EVERY 12 HOURS
Qty: 60 TABLET | Refills: 0 | Status: SHIPPED | OUTPATIENT
Start: 2022-04-08 | End: 2022-05-08

## 2022-04-05 RX ORDER — HYDROCODONE BITARTRATE AND ACETAMINOPHEN 10; 325 MG/1; MG/1
1 TABLET ORAL EVERY 12 HOURS
Qty: 60 TABLET | Refills: 0 | Status: SHIPPED | OUTPATIENT
Start: 2022-05-07 | End: 2022-06-06 | Stop reason: SDUPTHER

## 2022-04-05 NOTE — PROGRESS NOTES
Disclaimer:  This note has been generated using voice recognition software.  There may be type of graft focal areas that have been missed during a proof reading      Subjective:      Patient ID: Natali Claire is a 51 y.o. female.    Chief Complaint: Low-back Pain and Neck Pain      Pain  This is a chronic problem. The current episode started more than 1 year ago. The problem occurs daily. The problem has been unchanged. Associated symptoms include arthralgias and neck pain. Pertinent negatives include no change in bowel habit, chest pain, chills, coughing, diaphoresis, fatigue, rash, sore throat, vertigo or vomiting.     Review of Systems   Constitutional: Negative for activity change, chills, diaphoresis, fatigue and unexpected weight change.   HENT: Negative for drooling, ear discharge, ear pain, facial swelling, nosebleeds, sore throat, trouble swallowing, voice change and goiter.    Eyes: Negative for photophobia, pain, discharge, redness and visual disturbance.   Respiratory: Negative for apnea, cough, choking, chest tightness, shortness of breath, wheezing and stridor.    Cardiovascular: Negative for chest pain, palpitations and leg swelling.   Gastrointestinal: Negative for abdominal distention, change in bowel habit, diarrhea, rectal pain, vomiting, fecal incontinence and change in bowel habit.   Endocrine: Negative for cold intolerance, heat intolerance, polydipsia, polyphagia and polyuria.   Genitourinary: Negative for bladder incontinence, dysuria, flank pain, frequency and hot flashes.   Musculoskeletal: Positive for arthralgias, back pain, leg pain and neck pain.   Integumentary:  Negative for color change, pallor and rash.   Allergic/Immunologic: Negative for immunocompromised state.   Neurological: Negative for dizziness, vertigo, seizures, syncope, facial asymmetry, speech difficulty, light-headedness, disturbances in coordination, memory loss and coordination difficulties.   Hematological: Negative  "for adenopathy. Does not bruise/bleed easily.   Psychiatric/Behavioral: Negative for agitation, behavioral problems, confusion, decreased concentration, dysphoric mood, hallucinations, self-injury and suicidal ideas. The patient is not nervous/anxious and is not hyperactive.             Objective:  Vitals:    04/05/22 1428   BP: (!) 147/76   Pulse: (!) 59   Resp: 17   Weight: 134.7 kg (297 lb)   Height: 5' 9" (1.753 m)   PainSc:   6         Physical Exam  Vitals and nursing note reviewed. Exam conducted with a chaperone present.   Constitutional:       General: She is awake. She is not in acute distress.     Appearance: Normal appearance. She is not toxic-appearing.   HENT:      Head: Normocephalic and atraumatic.      Nose: Nose normal.      Mouth/Throat:      Mouth: Mucous membranes are moist.      Pharynx: Oropharynx is clear.   Eyes:      Conjunctiva/sclera: Conjunctivae normal.      Pupils: Pupils are equal, round, and reactive to light.   Cardiovascular:      Rate and Rhythm: Normal rate.   Pulmonary:      Effort: Pulmonary effort is normal. No respiratory distress.   Abdominal:      Palpations: Abdomen is soft.   Musculoskeletal:         General: Normal range of motion.      Cervical back: Normal range of motion and neck supple. Tenderness present.      Thoracic back: Tenderness present.      Lumbar back: Tenderness present.   Skin:     General: Skin is warm and dry.   Neurological:      General: No focal deficit present.      Mental Status: She is alert and oriented to person, place, and time. Mental status is at baseline.      Cranial Nerves: Cranial nerves are intact. No cranial nerve deficit (II-XII).   Psychiatric:         Mood and Affect: Mood normal.         Behavior: Behavior normal. Behavior is cooperative.         Thought Content: Thought content normal.           Orders Placed This Encounter   Procedures    POCT Urine Drug Screen Presump     Interpretive Information:     Negative:  No drug " detected at the cut off level.   Positive:  This result represents presumptive positive for the   tested drug, other substances may yield a positive response other   than the analyte of interest. This result should be utilized for   diagnostic purpose only. Confirmation testing will be performed upon physician request only.           X-Ray Hand 3 View Left  Narrative: EXAMINATION:  XR HAND COMPLETE 3 VIEW LEFT    CLINICAL HISTORY:  .  Pain in left hand    TECHNIQUE:  AP, lateral, and oblique views left hand    COMPARISON:  No previous similar    FINDINGS:  Osseous structures are well mineralized.  There is no acute fracture or dislocation.  There is no focal destructive osseous abnormality  Impression: No acute bony abnormality    Electronically signed by: Geremias Prado  Date:    12/02/2021  Time:    06:14       Office Visit on 12/06/2021   Component Date Value Ref Range Status    POC Amphetamines 12/06/2021 Negative  Negative, Inconclusive Final    POC Barbiturates 12/06/2021 Negative  Negative, Inconclusive Final    POC Benzodiazepines 12/06/2021 Negative  Negative, Inconclusive Final    POC Cocaine 12/06/2021 Negative  Negative, Inconclusive Final    POC THC 12/06/2021 Negative  Negative, Inconclusive Final    POC Methadone 12/06/2021 Negative  Negative, Inconclusive Final    POC Methamphetamine 12/06/2021 Negative  Negative, Inconclusive Final    POC Opiates 12/06/2021 Presumptive Positive (A) Negative, Inconclusive Final    POC Oxycodone 12/06/2021 Negative  Negative, Inconclusive Final    POC Phencyclidine 12/06/2021 Negative  Negative, Inconclusive Final    POC Methylenedioxymethamphetamine * 12/06/2021 Negative  Negative, Inconclusive Final    POC Tricyclic Antidepressants 12/06/2021 Negative  Negative, Inconclusive Final    POC Buprenorphine 12/06/2021 Negative   Final     Acceptable 12/06/2021 Yes   Final    POC Temperature (Urine) 12/06/2021 92   Final   Hospital  Outpatient Visit on 10/26/2021   Component Date Value Ref Range Status    Final Diagnosis 10/26/2021    Final                    Value:This result contains rich text formatting which cannot be displayed here.    Comments 10/26/2021    Final                    Value:This result contains rich text formatting which cannot be displayed here.    Gross Description 10/26/2021    Final                    Value:This result contains rich text formatting which cannot be displayed here.    Microscopic Description 10/26/2021    Final                    Value:This result contains rich text formatting which cannot be displayed here.    Clinical Information 10/26/2021    Final                    Value:This result contains rich text formatting which cannot be displayed here.    Laboratory Notes 10/26/2021    Final                    Value:This result contains rich text formatting which cannot be displayed here.         Assessment:      1. Rheumatoid arthritis of multiple sites without rheumatoid factor    2. Lumbosacral spondylosis without myelopathy    3. Encounter for long-term (current) use of other medications            A's of Opioid Risk Assessment  Activity:Patient can perform ADL.   Analgesia:Patients pain is partially controlled by current medication. Patient has tried OTC medications such as Tylenol and Ibuprofen with out relief.   Adverse Effects: Patient denies constipation or sedation.  Aberrant Behavior:  reviewed with no aberrant drug seeking/taking behavior.  Overdose reversal drug naloxone discussed    Drug screen reviewed      Requested Prescriptions     Signed Prescriptions Disp Refills    HYDROcodone-acetaminophen (NORCO)  mg per tablet 60 tablet 0     Sig: Take 1 tablet by mouth every 12 (twelve) hours.    HYDROcodone-acetaminophen (NORCO)  mg per tablet 60 tablet 0     Sig: Take 1 tablet by mouth every 12 (twelve) hours.         Plan:    Follows rheumatology rheumatoid arthritis  [facility]    She would like to continue with conservative management    She states current medications helping control her discomfort    Continue current medication    Continue home exercise program as directed    Follow-up 2 months    Dr. Claire, June 2022    Bring original prescription medication bottles/container/box with labels to each visit

## 2022-05-26 ENCOUNTER — TELEPHONE (OUTPATIENT)
Dept: OBSTETRICS AND GYNECOLOGY | Facility: CLINIC | Age: 52
End: 2022-05-26
Payer: COMMERCIAL

## 2022-05-26 RX ORDER — NORETHINDRONE ACETATE AND ETHINYL ESTRADIOL .02; 1 MG/1; MG/1
1 TABLET ORAL DAILY
Qty: 30 TABLET | Refills: 1 | Status: SHIPPED | OUTPATIENT
Start: 2022-05-26 | End: 2022-06-09

## 2022-05-26 NOTE — TELEPHONE ENCOUNTER
----- Message from Memo Barriga LPN sent at 5/13/2022  2:49 PM CDT -----  Contact: Pt    ----- Message -----  From: Cherrie Angel  Sent: 5/13/2022   2:39 PM CDT  To: Beth FLORES Staff    Type:  RX Refill Request    Who Called:  Pt    Refill or New Rx:  Refill    RX Name and Strength:  noreth-ethinyl estradioL-iron 0.4mg-35mcg(21) and 75 mg (7) Chew    Is this a 30 day or 90 day RX:  90    Preferred Pharmacy with phone number:    [facility]/pharmacy #[id number] [facility] [place] - [address] [place]  [address] [place] [id number]  Phone: [phone] Fax: [phone]    Best Call Back Number: [phone] or [phone]    Additional Information:  Pt states she is out of pills.  Please call back.  Thanks.

## 2022-06-06 ENCOUNTER — OFFICE VISIT (OUTPATIENT)
Dept: PAIN MEDICINE | Facility: CLINIC | Age: 52
End: 2022-06-06
Payer: MEDICARE

## 2022-06-06 VITALS
WEIGHT: 293 LBS | HEIGHT: 69 IN | HEART RATE: 60 BPM | SYSTOLIC BLOOD PRESSURE: 130 MMHG | DIASTOLIC BLOOD PRESSURE: 63 MMHG | BODY MASS INDEX: 43.4 KG/M2 | RESPIRATION RATE: 20 BRPM

## 2022-06-06 DIAGNOSIS — M47.817 LUMBOSACRAL SPONDYLOSIS WITHOUT MYELOPATHY: Chronic | ICD-10-CM

## 2022-06-06 DIAGNOSIS — M06.09 RHEUMATOID ARTHRITIS OF MULTIPLE SITES WITHOUT RHEUMATOID FACTOR: Primary | Chronic | ICD-10-CM

## 2022-06-06 PROCEDURE — 99214 OFFICE O/P EST MOD 30 MIN: CPT | Mod: S$PBB,25,, | Performed by: PHYSICIAN ASSISTANT

## 2022-06-06 PROCEDURE — 99215 OFFICE O/P EST HI 40 MIN: CPT | Mod: PBBFAC | Performed by: PHYSICIAN ASSISTANT

## 2022-06-06 PROCEDURE — 3075F SYST BP GE 130 - 139MM HG: CPT | Mod: CPTII,,, | Performed by: PHYSICIAN ASSISTANT

## 2022-06-06 PROCEDURE — 1159F PR MEDICATION LIST DOCUMENTED IN MEDICAL RECORD: ICD-10-PCS | Mod: CPTII,,, | Performed by: PHYSICIAN ASSISTANT

## 2022-06-06 PROCEDURE — 3078F PR MOST RECENT DIASTOLIC BLOOD PRESSURE < 80 MM HG: ICD-10-PCS | Mod: CPTII,,, | Performed by: PHYSICIAN ASSISTANT

## 2022-06-06 PROCEDURE — 3008F BODY MASS INDEX DOCD: CPT | Mod: CPTII,,, | Performed by: PHYSICIAN ASSISTANT

## 2022-06-06 PROCEDURE — 99214 PR OFFICE/OUTPT VISIT, EST, LEVL IV, 30-39 MIN: ICD-10-PCS | Mod: S$PBB,25,, | Performed by: PHYSICIAN ASSISTANT

## 2022-06-06 PROCEDURE — 3078F DIAST BP <80 MM HG: CPT | Mod: CPTII,,, | Performed by: PHYSICIAN ASSISTANT

## 2022-06-06 PROCEDURE — 96372 THER/PROPH/DIAG INJ SC/IM: CPT | Mod: PBBFAC | Performed by: PHYSICIAN ASSISTANT

## 2022-06-06 PROCEDURE — 1159F MED LIST DOCD IN RCRD: CPT | Mod: CPTII,,, | Performed by: PHYSICIAN ASSISTANT

## 2022-06-06 PROCEDURE — 3008F PR BODY MASS INDEX (BMI) DOCUMENTED: ICD-10-PCS | Mod: CPTII,,, | Performed by: PHYSICIAN ASSISTANT

## 2022-06-06 PROCEDURE — 3075F PR MOST RECENT SYSTOLIC BLOOD PRESS GE 130-139MM HG: ICD-10-PCS | Mod: CPTII,,, | Performed by: PHYSICIAN ASSISTANT

## 2022-06-06 RX ORDER — HYDROCODONE BITARTRATE AND ACETAMINOPHEN 10; 325 MG/1; MG/1
1 TABLET ORAL EVERY 12 HOURS
Qty: 60 TABLET | Refills: 0 | Status: SHIPPED | OUTPATIENT
Start: 2022-06-08 | End: 2022-07-08

## 2022-06-06 RX ORDER — HYDROCODONE BITARTRATE AND ACETAMINOPHEN 10; 325 MG/1; MG/1
1 TABLET ORAL EVERY 12 HOURS
Qty: 60 TABLET | Refills: 0 | Status: SHIPPED | OUTPATIENT
Start: 2022-07-08 | End: 2022-08-07

## 2022-06-06 RX ORDER — KETOROLAC TROMETHAMINE 30 MG/ML
60 INJECTION, SOLUTION INTRAMUSCULAR; INTRAVENOUS
Status: COMPLETED | OUTPATIENT
Start: 2022-06-06 | End: 2022-06-06

## 2022-06-06 RX ADMIN — KETOROLAC TROMETHAMINE 60 MG: 30 INJECTION, SOLUTION INTRAMUSCULAR at 03:06

## 2022-06-06 NOTE — PROGRESS NOTES
Subjective:      Patient ID: Natali Claire is a 51 y.o. female.    Chief Complaint: Shoulder Pain (left) and Low-back Pain      Pain  This is a chronic problem. The current episode started more than 1 year ago. The problem occurs daily. The problem has been waxing and waning. Associated symptoms include arthralgias and neck pain. Pertinent negatives include no change in bowel habit, chest pain, chills, coughing, diaphoresis, fatigue, rash, sore throat, vertigo or vomiting.     Review of Systems   Constitutional: Negative for activity change, chills, diaphoresis, fatigue and unexpected weight change.   HENT: Negative for drooling, ear discharge, ear pain, facial swelling, nosebleeds, sore throat, trouble swallowing, voice change and goiter.    Eyes: Negative for photophobia, pain, discharge, redness and visual disturbance.   Respiratory: Negative for apnea, cough, choking, chest tightness, shortness of breath, wheezing and stridor.    Cardiovascular: Negative for chest pain, palpitations and leg swelling.   Gastrointestinal: Negative for abdominal distention, change in bowel habit, diarrhea, rectal pain, vomiting, fecal incontinence and change in bowel habit.   Endocrine: Negative for cold intolerance, heat intolerance, polydipsia, polyphagia and polyuria.   Genitourinary: Negative for bladder incontinence, dysuria, flank pain, frequency and hot flashes.   Musculoskeletal: Positive for arthralgias, back pain, leg pain and neck pain.   Integumentary:  Negative for color change, pallor and rash.   Allergic/Immunologic: Negative for immunocompromised state.   Neurological: Negative for dizziness, vertigo, seizures, syncope, facial asymmetry, speech difficulty, light-headedness, disturbances in coordination, memory loss and coordination difficulties.   Hematological: Negative for adenopathy. Does not bruise/bleed easily.   Psychiatric/Behavioral: Negative for agitation, behavioral problems, confusion, decreased  "concentration, dysphoric mood, hallucinations, self-injury and suicidal ideas. The patient is not nervous/anxious and is not hyperactive.             Objective:  Vitals:    06/06/22 1401 06/06/22 1402   BP: 130/63    Pulse: 60    Resp: 20    Weight: 133.8 kg (295 lb)    Height: 5' 9" (1.753 m)    PainSc:   5   5         Physical Exam  Vitals and nursing note reviewed. Exam conducted with a chaperone present.   Constitutional:       General: She is awake. She is not in acute distress.     Appearance: Normal appearance. She is not toxic-appearing.   HENT:      Head: Normocephalic and atraumatic.      Nose: Nose normal.      Mouth/Throat:      Mouth: Mucous membranes are moist.      Pharynx: Oropharynx is clear.   Eyes:      Conjunctiva/sclera: Conjunctivae normal.      Pupils: Pupils are equal, round, and reactive to light.   Cardiovascular:      Rate and Rhythm: Normal rate.   Pulmonary:      Effort: Pulmonary effort is normal. No respiratory distress.   Abdominal:      Palpations: Abdomen is soft.   Musculoskeletal:         General: Normal range of motion.      Cervical back: Normal range of motion and neck supple. Tenderness present.      Thoracic back: Tenderness present.      Lumbar back: Tenderness present.   Skin:     General: Skin is warm and dry.   Neurological:      General: No focal deficit present.      Mental Status: She is alert and oriented to person, place, and time. Mental status is at baseline.      Cranial Nerves: Cranial nerves are intact. No cranial nerve deficit (II-XII).   Psychiatric:         Mood and Affect: Mood normal.         Behavior: Behavior normal. Behavior is cooperative.         Thought Content: Thought content normal.           No orders of the defined types were placed in this encounter.       X-Ray Hand 3 View Left  Narrative: EXAMINATION:  XR HAND COMPLETE 3 VIEW LEFT    CLINICAL HISTORY:  .  Pain in left hand    TECHNIQUE:  AP, lateral, and oblique views left " hand    COMPARISON:  No previous similar    FINDINGS:  Osseous structures are well mineralized.  There is no acute fracture or dislocation.  There is no focal destructive osseous abnormality  Impression: No acute bony abnormality    Electronically signed by: Geremias Prado  Date:    12/02/2021  Time:    06:14       Office Visit on 04/05/2022   Component Date Value Ref Range Status    POC Amphetamines 04/05/2022 Negative  Negative, Inconclusive Final    POC Barbiturates 04/05/2022 Negative  Negative, Inconclusive Final    POC Benzodiazepines 04/05/2022 Negative  Negative, Inconclusive Final    POC Cocaine 04/05/2022 Negative  Negative, Inconclusive Final    POC THC 04/05/2022 Negative  Negative, Inconclusive Final    POC Methadone 04/05/2022 Negative  Negative, Inconclusive Final    POC Methamphetamine 04/05/2022 Negative  Negative, Inconclusive Final    POC Opiates 04/05/2022 Presumptive Positive (A) Negative, Inconclusive Final    POC Oxycodone 04/05/2022 Negative  Negative, Inconclusive Final    POC Phencyclidine 04/05/2022 Negative  Negative, Inconclusive Final    POC Methylenedioxymethamphetamine * 04/05/2022 Negative  Negative, Inconclusive Final    POC Tricyclic Antidepressants 04/05/2022 Negative  Negative, Inconclusive Final    POC Buprenorphine 04/05/2022 Negative   Final     Acceptable 04/05/2022 Yes   Final    POC Temperature (Urine) 04/05/2022 92   Final         Assessment:      1. Rheumatoid arthritis of multiple sites without rheumatoid factor    2. Lumbosacral spondylosis without myelopathy            A's of Opioid Risk Assessment  Activity:Patient can perform ADL.   Analgesia:Patients pain is partially controlled by current medication. Patient has tried OTC medications such as Tylenol and Ibuprofen with out relief.   Adverse Effects: Patient denies constipation or sedation.  Aberrant Behavior:  reviewed with no aberrant drug seeking/taking behavior.  Overdose  reversal drug naloxone discussed    Drug screen reviewed      Requested Prescriptions     Pending Prescriptions Disp Refills    HYDROcodone-acetaminophen (NORCO)  mg per tablet 60 tablet 0     Sig: Take 1 tablet by mouth every 12 (twelve) hours.         Plan:    Follows rheumatology rheumatoid arthritis [facility]    Requesting Toradol injection for joint back pain    Otherwise she states she is doing quite well    Toradol 60 mg IM, tolerated well    Continue current medication    Continue home exercise program as directed    Follow-up 2 months    Dr. Claire, June 2022    Bring original prescription medication bottles/container/box with labels to each visit

## 2022-08-01 ENCOUNTER — OFFICE VISIT (OUTPATIENT)
Dept: PAIN MEDICINE | Facility: CLINIC | Age: 52
End: 2022-08-01
Payer: MEDICARE

## 2022-08-01 ENCOUNTER — HOSPITAL ENCOUNTER (OUTPATIENT)
Dept: RADIOLOGY | Facility: HOSPITAL | Age: 52
Discharge: HOME | End: 2022-08-01
Attending: PAIN MEDICINE
Payer: MEDICARE

## 2022-08-01 VITALS
DIASTOLIC BLOOD PRESSURE: 81 MMHG | HEIGHT: 70 IN | BODY MASS INDEX: 40.54 KG/M2 | WEIGHT: 283.19 LBS | HEART RATE: 59 BPM | SYSTOLIC BLOOD PRESSURE: 157 MMHG

## 2022-08-01 DIAGNOSIS — M05.79 RHEUMATOID ARTHRITIS INVOLVING MULTIPLE SITES WITH POSITIVE RHEUMATOID FACTOR: Chronic | ICD-10-CM

## 2022-08-01 DIAGNOSIS — M54.12 CERVICAL RADICULOPATHY: ICD-10-CM

## 2022-08-01 DIAGNOSIS — M47.817 LUMBOSACRAL SPONDYLOSIS WITHOUT MYELOPATHY: Chronic | ICD-10-CM

## 2022-08-01 DIAGNOSIS — M54.2 CERVICALGIA: Chronic | ICD-10-CM

## 2022-08-01 DIAGNOSIS — M54.12 CERVICAL RADICULOPATHY: Chronic | ICD-10-CM

## 2022-08-01 DIAGNOSIS — Z79.899 ENCOUNTER FOR LONG-TERM (CURRENT) USE OF OTHER MEDICATIONS: Primary | ICD-10-CM

## 2022-08-01 DIAGNOSIS — G89.4 CHRONIC PAIN SYNDROME: Chronic | ICD-10-CM

## 2022-08-01 LAB
CTP QC/QA: YES
POC (AMP) AMPHETAMINE: NEGATIVE
POC (BAR) BARBITURATES: NEGATIVE
POC (BUP) BUPRENORPHINE: NEGATIVE
POC (BZO) BENZODIAZEPINES: NEGATIVE
POC (COC) COCAINE: NEGATIVE
POC (MDMA) METHYLENEDIOXYMETHAMPHETAMINE 3,4: NEGATIVE
POC (MET) METHAMPHETAMINE: NEGATIVE
POC (MOP) OPIATES: ABNORMAL
POC (MTD) METHADONE: NEGATIVE
POC (OXY) OXYCODONE: NEGATIVE
POC (PCP) PHENCYCLIDINE: NEGATIVE
POC (TCA) TRICYCLIC ANTIDEPRESSANTS: NEGATIVE
POC TEMPERATURE (URINE): 90
POC THC: NEGATIVE

## 2022-08-01 PROCEDURE — 3079F PR MOST RECENT DIASTOLIC BLOOD PRESSURE 80-89 MM HG: ICD-10-PCS | Mod: CPTII,,, | Performed by: PAIN MEDICINE

## 2022-08-01 PROCEDURE — 3077F SYST BP >= 140 MM HG: CPT | Mod: CPTII,,, | Performed by: PAIN MEDICINE

## 2022-08-01 PROCEDURE — 99215 OFFICE O/P EST HI 40 MIN: CPT | Mod: PBBFAC | Performed by: PAIN MEDICINE

## 2022-08-01 PROCEDURE — 1159F PR MEDICATION LIST DOCUMENTED IN MEDICAL RECORD: ICD-10-PCS | Mod: CPTII,,, | Performed by: PAIN MEDICINE

## 2022-08-01 PROCEDURE — 3077F PR MOST RECENT SYSTOLIC BLOOD PRESSURE >= 140 MM HG: ICD-10-PCS | Mod: CPTII,,, | Performed by: PAIN MEDICINE

## 2022-08-01 PROCEDURE — 1159F MED LIST DOCD IN RCRD: CPT | Mod: CPTII,,, | Performed by: PAIN MEDICINE

## 2022-08-01 PROCEDURE — 99214 PR OFFICE/OUTPT VISIT, EST, LEVL IV, 30-39 MIN: ICD-10-PCS | Mod: S$PBB,,, | Performed by: PAIN MEDICINE

## 2022-08-01 PROCEDURE — 72040 X-RAY EXAM NECK SPINE 2-3 VW: CPT | Mod: 26,,, | Performed by: RADIOLOGY

## 2022-08-01 PROCEDURE — 80305 DRUG TEST PRSMV DIR OPT OBS: CPT | Mod: PBBFAC | Performed by: PAIN MEDICINE

## 2022-08-01 PROCEDURE — 72040 X-RAY EXAM NECK SPINE 2-3 VW: CPT | Mod: TC

## 2022-08-01 PROCEDURE — 3079F DIAST BP 80-89 MM HG: CPT | Mod: CPTII,,, | Performed by: PAIN MEDICINE

## 2022-08-01 PROCEDURE — 99214 OFFICE O/P EST MOD 30 MIN: CPT | Mod: S$PBB,,, | Performed by: PAIN MEDICINE

## 2022-08-01 PROCEDURE — 3008F BODY MASS INDEX DOCD: CPT | Mod: CPTII,,, | Performed by: PAIN MEDICINE

## 2022-08-01 PROCEDURE — 3008F PR BODY MASS INDEX (BMI) DOCUMENTED: ICD-10-PCS | Mod: CPTII,,, | Performed by: PAIN MEDICINE

## 2022-08-01 PROCEDURE — 72040 XR CERVICAL SPINE AP LATERAL: ICD-10-PCS | Mod: 26,,, | Performed by: RADIOLOGY

## 2022-08-01 RX ORDER — HYDROCODONE BITARTRATE AND ACETAMINOPHEN 10; 325 MG/1; MG/1
1 TABLET ORAL EVERY 12 HOURS PRN
Qty: 60 TABLET | Refills: 0 | Status: SHIPPED | OUTPATIENT
Start: 2022-08-05 | End: 2022-09-04

## 2022-08-01 RX ORDER — HYDROCODONE BITARTRATE AND ACETAMINOPHEN 10; 325 MG/1; MG/1
1 TABLET ORAL EVERY 12 HOURS PRN
Qty: 60 TABLET | Refills: 0 | Status: SHIPPED | OUTPATIENT
Start: 2022-09-06 | End: 2022-10-03 | Stop reason: SDUPTHER
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
## 2022-08-10 RX ORDER — GLIPIZIDE 10 MG/1
TABLET ORAL
Qty: 30 TABLET | Refills: 5 | Status: SHIPPED | OUTPATIENT
Start: 2022-08-10 | End: 2023-02-27 | Stop reason: SDUPTHER

## 2022-10-03 ENCOUNTER — OFFICE VISIT (OUTPATIENT)
Dept: PAIN MEDICINE | Facility: CLINIC | Age: 52
End: 2022-10-03
Payer: MEDICARE

## 2022-10-03 VITALS
DIASTOLIC BLOOD PRESSURE: 85 MMHG | BODY MASS INDEX: 43.4 KG/M2 | WEIGHT: 293 LBS | HEART RATE: 82 BPM | RESPIRATION RATE: 20 BRPM | SYSTOLIC BLOOD PRESSURE: 168 MMHG | HEIGHT: 69 IN

## 2022-10-03 DIAGNOSIS — M54.12 CERVICAL RADICULOPATHY: Chronic | ICD-10-CM

## 2022-10-03 DIAGNOSIS — M47.817 LUMBOSACRAL SPONDYLOSIS WITHOUT MYELOPATHY: Chronic | ICD-10-CM

## 2022-10-03 DIAGNOSIS — M06.09 RHEUMATOID ARTHRITIS OF MULTIPLE SITES WITHOUT RHEUMATOID FACTOR: Primary | Chronic | ICD-10-CM

## 2022-10-03 PROBLEM — M06.9 RHEUMATOID ARTHRITIS: Chronic | Status: ACTIVE | Noted: 2021-12-01

## 2022-10-03 PROCEDURE — 3008F BODY MASS INDEX DOCD: CPT | Mod: CPTII,,, | Performed by: PHYSICIAN ASSISTANT

## 2022-10-03 PROCEDURE — 96372 THER/PROPH/DIAG INJ SC/IM: CPT | Mod: PBBFAC | Performed by: PHYSICIAN ASSISTANT

## 2022-10-03 PROCEDURE — 99214 OFFICE O/P EST MOD 30 MIN: CPT | Mod: S$PBB,25,, | Performed by: PHYSICIAN ASSISTANT

## 2022-10-03 PROCEDURE — 3077F SYST BP >= 140 MM HG: CPT | Mod: CPTII,,, | Performed by: PHYSICIAN ASSISTANT

## 2022-10-03 PROCEDURE — 3077F PR MOST RECENT SYSTOLIC BLOOD PRESSURE >= 140 MM HG: ICD-10-PCS | Mod: CPTII,,, | Performed by: PHYSICIAN ASSISTANT

## 2022-10-03 PROCEDURE — 1159F MED LIST DOCD IN RCRD: CPT | Mod: CPTII,,, | Performed by: PHYSICIAN ASSISTANT

## 2022-10-03 PROCEDURE — 3079F PR MOST RECENT DIASTOLIC BLOOD PRESSURE 80-89 MM HG: ICD-10-PCS | Mod: CPTII,,, | Performed by: PHYSICIAN ASSISTANT

## 2022-10-03 PROCEDURE — 99215 OFFICE O/P EST HI 40 MIN: CPT | Mod: PBBFAC | Performed by: PHYSICIAN ASSISTANT

## 2022-10-03 PROCEDURE — 3079F DIAST BP 80-89 MM HG: CPT | Mod: CPTII,,, | Performed by: PHYSICIAN ASSISTANT

## 2022-10-03 PROCEDURE — 3008F PR BODY MASS INDEX (BMI) DOCUMENTED: ICD-10-PCS | Mod: CPTII,,, | Performed by: PHYSICIAN ASSISTANT

## 2022-10-03 PROCEDURE — 99214 PR OFFICE/OUTPT VISIT, EST, LEVL IV, 30-39 MIN: ICD-10-PCS | Mod: S$PBB,25,, | Performed by: PHYSICIAN ASSISTANT

## 2022-10-03 PROCEDURE — 1159F PR MEDICATION LIST DOCUMENTED IN MEDICAL RECORD: ICD-10-PCS | Mod: CPTII,,, | Performed by: PHYSICIAN ASSISTANT

## 2022-10-03 RX ORDER — HYDROCODONE BITARTRATE AND ACETAMINOPHEN 10; 325 MG/1; MG/1
1 TABLET ORAL EVERY 12 HOURS PRN
Qty: 60 TABLET | Refills: 0 | Status: SHIPPED | OUTPATIENT
Start: 2022-11-07 | End: 2022-12-01 | Stop reason: SDUPTHER

## 2022-10-03 RX ORDER — HYDROCODONE BITARTRATE AND ACETAMINOPHEN 10; 325 MG/1; MG/1
1 TABLET ORAL EVERY 12 HOURS PRN
Qty: 60 TABLET | Refills: 0 | Status: SHIPPED | OUTPATIENT
Start: 2022-10-08 | End: 2022-11-07

## 2022-10-03 RX ORDER — KETOROLAC TROMETHAMINE 30 MG/ML
60 INJECTION, SOLUTION INTRAMUSCULAR; INTRAVENOUS
Status: COMPLETED | OUTPATIENT
Start: 2022-10-03 | End: 2022-10-03

## 2022-10-03 RX ADMIN — KETOROLAC TROMETHAMINE 60 MG: 30 INJECTION, SOLUTION INTRAMUSCULAR; INTRAVENOUS at 11:10

## 2022-10-03 NOTE — PROGRESS NOTES
Subjective:         Patient ID: Natali Claire is a 52 y.o. female.    Chief Complaint: Arm Pain and Low-back Pain (Ankle pain)      Pain  This is a chronic problem. The current episode started more than 1 year ago. The problem occurs daily. The problem has been unchanged. Associated symptoms include arthralgias and neck pain. Pertinent negatives include no abdominal pain, change in bowel habit, chest pain, chills, coughing, diaphoresis, fatigue, fever, rash, sore throat, vertigo or vomiting.   Review of Systems   Constitutional:  Negative for activity change, chills, diaphoresis, fatigue, fever and unexpected weight change.   HENT:  Negative for drooling, ear discharge, ear pain, facial swelling, nosebleeds, sore throat, trouble swallowing, voice change and goiter.    Eyes:  Negative for photophobia, pain, discharge, redness and visual disturbance.   Respiratory:  Negative for apnea, cough, choking, chest tightness, shortness of breath, wheezing and stridor.    Cardiovascular:  Negative for chest pain, palpitations and leg swelling.   Gastrointestinal:  Negative for abdominal distention, abdominal pain, change in bowel habit, diarrhea, rectal pain, vomiting, fecal incontinence and change in bowel habit.   Endocrine: Negative for cold intolerance, heat intolerance, polydipsia, polyphagia and polyuria.   Genitourinary:  Negative for bladder incontinence, dysuria, flank pain, frequency and hot flashes.   Musculoskeletal:  Positive for arthralgias, back pain, leg pain and neck pain.   Integumentary:  Negative for color change, pallor and rash.   Allergic/Immunologic: Negative for immunocompromised state.   Neurological:  Negative for dizziness, vertigo, seizures, syncope, facial asymmetry, speech difficulty, light-headedness, coordination difficulties, memory loss and coordination difficulties.   Hematological:  Negative for adenopathy. Does not bruise/bleed easily.   Psychiatric/Behavioral:  Negative for agitation,  "behavioral problems, confusion, decreased concentration, dysphoric mood, hallucinations, self-injury and suicidal ideas. The patient is not nervous/anxious and is not hyperactive.          Past Medical History:   Diagnosis Date    Anxiety     Arthritis     Chronic low back pain     Chronic pain syndrome     Degenerative joint disease involving multiple joints     Diabetes mellitus, type 2     GERD (gastroesophageal reflux disease)     Hyperlipidemia     Hypertension     Lumbar radiculopathy     Lumbosacral spondylosis     Multiple joint pain     Onychomycosis     Osteoarthritis     Other long term (current) drug therapy     Rheumatoid arthritis      Past Surgical History:   Procedure Laterality Date    BREAST SURGERY      CHOLECYSTECTOMY      TUBAL LIGATION       Social History     Socioeconomic History    Marital status: Single   Tobacco Use    Smoking status: Never    Smokeless tobacco: Never   Substance and Sexual Activity    Alcohol use: Not Currently    Drug use: Never    Sexual activity: Yes     Partners: Male     Family History   Problem Relation Age of Onset    Diabetes Mother     Heart disease Father     Diabetes Sister     Breast cancer Paternal Cousin      Review of patient's allergies indicates:  No Known Allergies     Objective:  Vitals:    10/03/22 1039 10/03/22 1040   BP: (!) 168/85    Pulse: 82    Resp: 20    Weight: 136.1 kg (300 lb)    Height: 5' 9" (1.753 m)    PainSc:   4   4         Physical Exam  Vitals and nursing note reviewed. Exam conducted with a chaperone present.   Constitutional:       General: She is awake. She is not in acute distress.     Appearance: Normal appearance. She is not ill-appearing or toxic-appearing.   HENT:      Head: Normocephalic and atraumatic.      Nose: Nose normal.      Mouth/Throat:      Mouth: Mucous membranes are moist.      Pharynx: Oropharynx is clear.   Eyes:      Conjunctiva/sclera: Conjunctivae normal.      Pupils: Pupils are equal, round, and reactive to " light.   Cardiovascular:      Rate and Rhythm: Normal rate.   Pulmonary:      Effort: Pulmonary effort is normal. No respiratory distress.   Abdominal:      Palpations: Abdomen is soft.   Musculoskeletal:         General: Normal range of motion.      Cervical back: Normal range of motion and neck supple. Tenderness present.      Thoracic back: Tenderness present.      Lumbar back: Tenderness present.   Skin:     General: Skin is warm and dry.   Neurological:      General: No focal deficit present.      Mental Status: She is alert and oriented to person, place, and time. Mental status is at baseline.      Cranial Nerves: No cranial nerve deficit (II-XII).   Psychiatric:         Mood and Affect: Mood normal.         Behavior: Behavior normal. Behavior is cooperative.         Thought Content: Thought content normal.         X-Ray Cervical Spine AP And Lateral  Narrative: EXAMINATION:  XR CERVICAL SPINE AP LATERAL    CLINICAL HISTORY:  .  Radiculopathy, cervical region    COMPARISON:  May 3, 2021    TECHNIQUE:  Cervical spine AP and lateral    FINDINGS:  There is slight straightening of the spine which may be positional or related to muscle spasm.    No fracture is seen.  There is no prevertebral soft tissue swelling.  There is mild anterior spondylosis and minimal degenerative disc narrowing at C5-C6.  There is mild facet arthropathy.  Impression: Mild degenerative disc disease C5-C6 without significant interval change    Electronically signed by: Geremias Prado  Date:    08/01/2022  Time:    14:09       Office Visit on 08/01/2022   Component Date Value Ref Range Status    POC Amphetamines 08/01/2022 Negative  Negative, Inconclusive Final    POC Barbiturates 08/01/2022 Negative  Negative, Inconclusive Final    POC Benzodiazepines 08/01/2022 Negative  Negative, Inconclusive Final    POC Cocaine 08/01/2022 Negative  Negative, Inconclusive Final    POC THC 08/01/2022 Negative  Negative, Inconclusive Final    POC  Methadone 08/01/2022 Negative  Negative, Inconclusive Final    POC Methamphetamine 08/01/2022 Negative  Negative, Inconclusive Final    POC Opiates 08/01/2022 Presumptive Positive (A)  Negative, Inconclusive Final    POC Oxycodone 08/01/2022 Negative  Negative, Inconclusive Final    POC Phencyclidine 08/01/2022 Negative  Negative, Inconclusive Final    POC Methylenedioxymethamphetamine * 08/01/2022 Negative  Negative, Inconclusive Final    POC Tricyclic Antidepressants 08/01/2022 Negative  Negative, Inconclusive Final    POC Buprenorphine 08/01/2022 Negative   Final     Acceptable 08/01/2022 Yes   Final    POC Temperature (Urine) 08/01/2022 90   Final   Office Visit on 04/05/2022   Component Date Value Ref Range Status    POC Amphetamines 04/05/2022 Negative  Negative, Inconclusive Final    POC Barbiturates 04/05/2022 Negative  Negative, Inconclusive Final    POC Benzodiazepines 04/05/2022 Negative  Negative, Inconclusive Final    POC Cocaine 04/05/2022 Negative  Negative, Inconclusive Final    POC THC 04/05/2022 Negative  Negative, Inconclusive Final    POC Methadone 04/05/2022 Negative  Negative, Inconclusive Final    POC Methamphetamine 04/05/2022 Negative  Negative, Inconclusive Final    POC Opiates 04/05/2022 Presumptive Positive (A)  Negative, Inconclusive Final    POC Oxycodone 04/05/2022 Negative  Negative, Inconclusive Final    POC Phencyclidine 04/05/2022 Negative  Negative, Inconclusive Final    POC Methylenedioxymethamphetamine * 04/05/2022 Negative  Negative, Inconclusive Final    POC Tricyclic Antidepressants 04/05/2022 Negative  Negative, Inconclusive Final    POC Buprenorphine 04/05/2022 Negative   Final     Acceptable 04/05/2022 Yes   Final    POC Temperature (Urine) 04/05/2022 92   Final         Orders Placed This Encounter   Procedures    Ambulatory referral/consult to Physical/Occupational Therapy     Standing Status:   Future     Standing Expiration Date:    11/3/2023     Referral Priority:   Routine     Referral Type:   Physical Medicine     Referral Reason:   Specialty Services Required     Requested Specialty:   Physical Therapy     Number of Visits Requested:   1         Requested Prescriptions     Signed Prescriptions Disp Refills    HYDROcodone-acetaminophen (NORCO)  mg per tablet 60 tablet 0     Sig: Take 1 tablet by mouth every 12 (twelve) hours as needed for Pain.    HYDROcodone-acetaminophen (NORCO)  mg per tablet 60 tablet 0     Sig: Take 1 tablet by mouth every 12 (twelve) hours as needed for Pain.       Assessment:     1. Rheumatoid arthritis of multiple sites without rheumatoid factor    2. Cervical radiculopathy    3. Lumbosacral spondylosis without myelopathy         A's of Opioid Risk Assessment  Activity:Patient can perform ADL.   Analgesia:Patients pain is partially controlled by current medication. Patient has tried OTC medications such as Tylenol and Ibuprofen with out relief.   Adverse Effects: Patient denies constipation or sedation.  Aberrant Behavior:  reviewed with no aberrant drug seeking/taking behavior.  Overdose reversal drug naloxone discussed    Drug screen reviewed      Plan:    Consider MRI of the cervical spine after completion of physical therapy    Requesting prescription for physical therapy     Prescription physical therapy given to the patient    Consider caudal epidural steroid injection for lumbosacral radiculopathy    Follows rheumatology rheumatoid arthritis [facility]    Requesting Toradol injection for joint back pain    Otherwise she states she is doing quite well    Toradol 60 mg IM, tolerated well    Continue current medication    Follow-up 2 months    Dr. Claire, August 2023    Bring original prescription medication bottles/container/box with labels to each visit    Pill count    Physical therapy prescription October 2022 [facility]    Massage therapy [facility]

## 2022-10-14 ENCOUNTER — TELEPHONE (OUTPATIENT)
Dept: PRIMARY CARE CLINIC | Facility: CLINIC | Age: 52
End: 2022-10-14
Payer: MEDICARE

## 2022-10-14 NOTE — TELEPHONE ENCOUNTER
----- Message from Joyce Luo sent at 10/14/2022  9:42 AM CDT -----  Need refill on her [facility] and FLEXER call to [facility]

## 2022-10-17 ENCOUNTER — OFFICE VISIT (OUTPATIENT)
Dept: PRIMARY CARE CLINIC | Facility: CLINIC | Age: 52
End: 2022-10-17
Payer: MEDICARE

## 2022-10-17 VITALS
HEART RATE: 71 BPM | SYSTOLIC BLOOD PRESSURE: 128 MMHG | BODY MASS INDEX: 43.4 KG/M2 | DIASTOLIC BLOOD PRESSURE: 84 MMHG | WEIGHT: 293 LBS | OXYGEN SATURATION: 98 % | HEIGHT: 69 IN | TEMPERATURE: 98 F | RESPIRATION RATE: 20 BRPM

## 2022-10-17 DIAGNOSIS — I10 PRIMARY HYPERTENSION: ICD-10-CM

## 2022-10-17 DIAGNOSIS — M05.79 RHEUMATOID ARTHRITIS INVOLVING MULTIPLE SITES WITH POSITIVE RHEUMATOID FACTOR: ICD-10-CM

## 2022-10-17 DIAGNOSIS — M62.838 MUSCLE SPASM: ICD-10-CM

## 2022-10-17 DIAGNOSIS — E11.9 TYPE 2 DIABETES MELLITUS WITHOUT COMPLICATION, WITHOUT LONG-TERM CURRENT USE OF INSULIN: Primary | ICD-10-CM

## 2022-10-17 PROBLEM — M54.9 BACKACHE: Status: ACTIVE | Noted: 2022-10-17

## 2022-10-17 LAB
ALBUMIN SERPL BCP-MCNC: 3.2 G/DL (ref 3.5–5)
ALBUMIN/GLOB SERPL: 0.7 {RATIO}
ALP SERPL-CCNC: 147 U/L (ref 41–108)
ALT SERPL W P-5'-P-CCNC: 33 U/L (ref 13–56)
ANION GAP SERPL CALCULATED.3IONS-SCNC: 8 MMOL/L (ref 7–16)
AST SERPL W P-5'-P-CCNC: 24 U/L (ref 15–37)
BASOPHILS # BLD AUTO: 0.07 K/UL (ref 0–0.2)
BASOPHILS NFR BLD AUTO: 1.4 % (ref 0–1)
BILIRUB SERPL-MCNC: 0.4 MG/DL (ref ?–1.2)
BUN SERPL-MCNC: 10 MG/DL (ref 7–18)
BUN/CREAT SERPL: 11 (ref 6–20)
CALCIUM SERPL-MCNC: 9 MG/DL (ref 8.5–10.1)
CHLORIDE SERPL-SCNC: 103 MMOL/L (ref 98–107)
CHOLEST SERPL-MCNC: 151 MG/DL (ref 0–200)
CHOLEST/HDLC SERPL: 4.6 {RATIO}
CO2 SERPL-SCNC: 32 MMOL/L (ref 21–32)
CREAT SERPL-MCNC: 0.93 MG/DL (ref 0.55–1.02)
DIFFERENTIAL METHOD BLD: ABNORMAL
EGFR (NO RACE VARIABLE) (RUSH/TITUS): 74 ML/MIN/1.73M²
EOSINOPHIL # BLD AUTO: 0.22 K/UL (ref 0–0.5)
EOSINOPHIL NFR BLD AUTO: 4.3 % (ref 1–4)
ERYTHROCYTE [DISTWIDTH] IN BLOOD BY AUTOMATED COUNT: 14.7 % (ref 11.5–14.5)
EST. AVERAGE GLUCOSE BLD GHB EST-MCNC: 190 MG/DL
GLOBULIN SER-MCNC: 4.8 G/DL (ref 2–4)
GLUCOSE SERPL-MCNC: 137 MG/DL (ref 74–106)
HBA1C MFR BLD HPLC: 8.3 % (ref 4.5–6.6)
HCT VFR BLD AUTO: 37.5 % (ref 38–47)
HDLC SERPL-MCNC: 33 MG/DL (ref 40–60)
HGB BLD-MCNC: 12.4 G/DL (ref 12–16)
IMM GRANULOCYTES # BLD AUTO: 0.01 K/UL (ref 0–0.04)
IMM GRANULOCYTES NFR BLD: 0.2 % (ref 0–0.4)
LDLC SERPL CALC-MCNC: 80 MG/DL
LDLC/HDLC SERPL: 2.4 {RATIO}
LYMPHOCYTES # BLD AUTO: 1.81 K/UL (ref 1–4.8)
LYMPHOCYTES NFR BLD AUTO: 35.1 % (ref 27–41)
MCH RBC QN AUTO: 29.2 PG (ref 27–31)
MCHC RBC AUTO-ENTMCNC: 33.1 G/DL (ref 32–36)
MCV RBC AUTO: 88.2 FL (ref 80–96)
MONOCYTES # BLD AUTO: 0.31 K/UL (ref 0–0.8)
MONOCYTES NFR BLD AUTO: 6 % (ref 2–6)
MPC BLD CALC-MCNC: 10.5 FL (ref 9.4–12.4)
NEUTROPHILS # BLD AUTO: 2.74 K/UL (ref 1.8–7.7)
NEUTROPHILS NFR BLD AUTO: 53 % (ref 53–65)
NONHDLC SERPL-MCNC: 118 MG/DL
NRBC # BLD AUTO: 0 X10E3/UL
NRBC, AUTO (.00): 0 %
PLATELET # BLD AUTO: 317 K/UL (ref 150–400)
POTASSIUM SERPL-SCNC: 3.6 MMOL/L (ref 3.5–5.1)
PROT SERPL-MCNC: 8 G/DL (ref 6.4–8.2)
RBC # BLD AUTO: 4.25 M/UL (ref 4.2–5.4)
SODIUM SERPL-SCNC: 139 MMOL/L (ref 136–145)
TRIGL SERPL-MCNC: 189 MG/DL (ref 35–150)
VLDLC SERPL-MCNC: 38 MG/DL
WBC # BLD AUTO: 5.16 K/UL (ref 4.5–11)

## 2022-10-17 PROCEDURE — 80053 COMPREHEN METABOLIC PANEL: CPT | Mod: ,,, | Performed by: CLINICAL MEDICAL LABORATORY

## 2022-10-17 PROCEDURE — 3074F PR MOST RECENT SYSTOLIC BLOOD PRESSURE < 130 MM HG: ICD-10-PCS | Mod: ,,, | Performed by: FAMILY MEDICINE

## 2022-10-17 PROCEDURE — 85025 COMPLETE CBC W/AUTO DIFF WBC: CPT | Mod: ,,, | Performed by: CLINICAL MEDICAL LABORATORY

## 2022-10-17 PROCEDURE — 99214 OFFICE O/P EST MOD 30 MIN: CPT | Mod: ,,, | Performed by: FAMILY MEDICINE

## 2022-10-17 PROCEDURE — 80061 LIPID PANEL: ICD-10-PCS | Mod: ,,, | Performed by: CLINICAL MEDICAL LABORATORY

## 2022-10-17 PROCEDURE — 85025 CBC WITH DIFFERENTIAL: ICD-10-PCS | Mod: ,,, | Performed by: CLINICAL MEDICAL LABORATORY

## 2022-10-17 PROCEDURE — 83036 HEMOGLOBIN A1C: ICD-10-PCS | Mod: ,,, | Performed by: CLINICAL MEDICAL LABORATORY

## 2022-10-17 PROCEDURE — 1159F PR MEDICATION LIST DOCUMENTED IN MEDICAL RECORD: ICD-10-PCS | Mod: ,,, | Performed by: FAMILY MEDICINE

## 2022-10-17 PROCEDURE — 3074F SYST BP LT 130 MM HG: CPT | Mod: ,,, | Performed by: FAMILY MEDICINE

## 2022-10-17 PROCEDURE — 1160F RVW MEDS BY RX/DR IN RCRD: CPT | Mod: ,,, | Performed by: FAMILY MEDICINE

## 2022-10-17 PROCEDURE — 3079F PR MOST RECENT DIASTOLIC BLOOD PRESSURE 80-89 MM HG: ICD-10-PCS | Mod: ,,, | Performed by: FAMILY MEDICINE

## 2022-10-17 PROCEDURE — 1159F MED LIST DOCD IN RCRD: CPT | Mod: ,,, | Performed by: FAMILY MEDICINE

## 2022-10-17 PROCEDURE — 80061 LIPID PANEL: CPT | Mod: ,,, | Performed by: CLINICAL MEDICAL LABORATORY

## 2022-10-17 PROCEDURE — 3079F DIAST BP 80-89 MM HG: CPT | Mod: ,,, | Performed by: FAMILY MEDICINE

## 2022-10-17 PROCEDURE — 1160F PR REVIEW ALL MEDS BY PRESCRIBER/CLIN PHARMACIST DOCUMENTED: ICD-10-PCS | Mod: ,,, | Performed by: FAMILY MEDICINE

## 2022-10-17 PROCEDURE — 80053 COMPREHENSIVE METABOLIC PANEL: ICD-10-PCS | Mod: ,,, | Performed by: CLINICAL MEDICAL LABORATORY

## 2022-10-17 PROCEDURE — 83036 HEMOGLOBIN GLYCOSYLATED A1C: CPT | Mod: ,,, | Performed by: CLINICAL MEDICAL LABORATORY

## 2022-10-17 PROCEDURE — 99214 PR OFFICE/OUTPT VISIT, EST, LEVL IV, 30-39 MIN: ICD-10-PCS | Mod: ,,, | Performed by: FAMILY MEDICINE

## 2022-10-17 RX ORDER — MONTELUKAST SODIUM 10 MG/1
10 TABLET ORAL NIGHTLY
Qty: 30 TABLET | Refills: 5 | Status: SHIPPED | OUTPATIENT
Start: 2022-10-17 | End: 2023-06-19

## 2022-10-17 RX ORDER — FOLIC ACID 1 MG/1
1 TABLET ORAL DAILY
Qty: 30 TABLET | Refills: 5 | Status: SHIPPED | OUTPATIENT
Start: 2022-10-17 | End: 2023-02-27 | Stop reason: SDUPTHER

## 2022-10-17 RX ORDER — NABUMETONE 750 MG/1
750 TABLET, FILM COATED ORAL 2 TIMES DAILY
Qty: 60 TABLET | Refills: 5 | Status: SHIPPED | OUTPATIENT
Start: 2022-10-17 | End: 2023-08-02

## 2022-10-17 RX ORDER — HYDROCHLOROTHIAZIDE 25 MG/1
25 TABLET ORAL DAILY
Qty: 90 TABLET | Refills: 3 | Status: SHIPPED | OUTPATIENT
Start: 2022-10-17 | End: 2023-01-19

## 2022-10-17 RX ORDER — LORATADINE 10 MG/1
10 TABLET ORAL DAILY
Qty: 90 TABLET | Refills: 3 | Status: SHIPPED | OUTPATIENT
Start: 2022-10-17 | End: 2024-01-03

## 2022-10-17 RX ORDER — OMEPRAZOLE 20 MG/1
CAPSULE, DELAYED RELEASE ORAL
Qty: 30 CAPSULE | Refills: 3 | Status: SHIPPED | OUTPATIENT
Start: 2022-10-17 | End: 2024-03-13

## 2022-10-17 RX ORDER — METFORMIN HYDROCHLORIDE 500 MG/1
500 TABLET ORAL 2 TIMES DAILY WITH MEALS
Qty: 180 TABLET | Refills: 3 | Status: SHIPPED | OUTPATIENT
Start: 2022-10-17 | End: 2023-02-27 | Stop reason: SDUPTHER

## 2022-10-17 RX ORDER — CYCLOBENZAPRINE HCL 10 MG
10 TABLET ORAL 3 TIMES DAILY PRN
Qty: 90 TABLET | Refills: 2 | Status: SHIPPED | OUTPATIENT
Start: 2022-10-17 | End: 2023-09-19

## 2022-10-17 RX ORDER — GLIMEPIRIDE 4 MG/1
4 TABLET ORAL
Qty: 90 TABLET | Refills: 3 | Status: SHIPPED | OUTPATIENT
Start: 2022-10-17 | End: 2023-02-27 | Stop reason: SDUPTHER

## 2022-10-17 RX ORDER — DICLOFENAC SODIUM 10 MG/G
2 GEL TOPICAL 4 TIMES DAILY
Qty: 100 G | Refills: 5 | Status: SHIPPED | OUTPATIENT
Start: 2022-10-17

## 2022-10-17 RX ORDER — TIZANIDINE 4 MG/1
TABLET ORAL
Qty: 90 TABLET | Refills: 2 | Status: CANCELLED | OUTPATIENT
Start: 2022-10-17

## 2022-10-17 NOTE — PROGRESS NOTES
Subjective:       Patient ID: Natali Claire is a 52 y.o. female.    Chief Complaint: Medication Refill (Here for labs and refills. ), Diabetes, and Hypertension    No complaints today. Needs refills.    Medication Refill  Associated symptoms include arthralgias. Pertinent negatives include no chest pain, congestion, coughing, fatigue, fever, headaches, nausea or vomiting.   Diabetes  Pertinent negatives for hypoglycemia include no confusion or headaches. Pertinent negatives for diabetes include no chest pain and no fatigue.   Hypertension  Pertinent negatives include no chest pain, headaches or shortness of breath.   Review of Systems   Constitutional:  Negative for fatigue and fever.   HENT:  Negative for nasal congestion and dental problem.    Eyes:  Negative for discharge.   Respiratory:  Negative for cough and shortness of breath.    Cardiovascular:  Negative for chest pain.   Gastrointestinal:  Negative for constipation, diarrhea, nausea and vomiting.   Genitourinary:  Negative for bladder incontinence, difficulty urinating and hot flashes.   Musculoskeletal:  Positive for arthralgias.   Allergic/Immunologic: Negative for environmental allergies.   Neurological:  Negative for headaches.   Psychiatric/Behavioral:  Negative for behavioral problems and confusion.        Objective:      Physical Exam  Vitals and nursing note reviewed.   Constitutional:       Appearance: Normal appearance. She is normal weight.   HENT:      Head: Normocephalic and atraumatic.      Right Ear: Tympanic membrane normal.      Left Ear: Tympanic membrane normal.      Nose: Nose normal.      Mouth/Throat:      Mouth: Mucous membranes are moist.   Eyes:      Extraocular Movements: Extraocular movements intact.      Conjunctiva/sclera: Conjunctivae normal.      Pupils: Pupils are equal, round, and reactive to light.   Cardiovascular:      Rate and Rhythm: Normal rate and regular rhythm.      Pulses: Normal pulses.   Pulmonary:      Effort:  Pulmonary effort is normal.      Breath sounds: Normal breath sounds.   Abdominal:      General: Abdomen is flat. Bowel sounds are normal.      Palpations: Abdomen is soft.   Musculoskeletal:         General: Normal range of motion.      Cervical back: Normal range of motion and neck supple.      Comments: Multiple site arthritis   Skin:     General: Skin is warm and dry.   Neurological:      General: No focal deficit present.      Mental Status: She is alert and oriented to person, place, and time.   Psychiatric:         Mood and Affect: Mood normal.       Assessment:       Problem List Items Addressed This Visit          Cardiac/Vascular    Primary hypertension    Relevant Medications    hydroCHLOROthiazide (HYDRODIURIL) 25 MG tablet    Other Relevant Orders    CBC Auto Differential    Comprehensive Metabolic Panel    Lipid Panel       Immunology/Multi System    Rheumatoid arthritis (Chronic)    Relevant Medications    diclofenac sodium (VOLTAREN) 1 % Gel    folic acid (FOLVITE) 1 MG tablet    nabumetone (RELAFEN) 750 MG tablet    omeprazole (PRILOSEC) 20 MG capsule       Endocrine    Type 2 diabetes mellitus without complication, without long-term current use of insulin - Primary    Relevant Medications    glimepiride (AMARYL) 4 MG tablet    loratadine (CLARITIN) 10 mg tablet    metFORMIN (GLUCOPHAGE) 500 MG tablet    montelukast (SINGULAIR) 10 mg tablet    Other Relevant Orders    Lipid Panel    Hemoglobin A1C     Other Visit Diagnoses       Muscle spasm        Relevant Medications    cyclobenzaprine (FLEXERIL) 10 MG tablet              Plan:       RTC as needed

## 2022-10-18 ENCOUNTER — HOSPITAL ENCOUNTER (OUTPATIENT)
Dept: RADIOLOGY | Facility: HOSPITAL | Age: 52
Discharge: HOME | End: 2022-10-18
Payer: MEDICARE

## 2022-10-18 DIAGNOSIS — Z12.31 OTHER SCREENING MAMMOGRAM: ICD-10-CM

## 2022-10-18 PROCEDURE — 77067 SCR MAMMO BI INCL CAD: CPT | Mod: TC

## 2022-10-21 ENCOUNTER — TELEPHONE (OUTPATIENT)
Dept: PRIMARY CARE CLINIC | Facility: CLINIC | Age: 52
End: 2022-10-21
Payer: MEDICARE

## 2022-10-21 NOTE — TELEPHONE ENCOUNTER
----- Message from Herlinda Louis MD sent at 10/18/2022  8:39 AM CDT -----  Please give pt. Results - diabetes not controlled

## 2022-11-09 DIAGNOSIS — Z71.89 COMPLEX CARE COORDINATION: ICD-10-CM

## 2022-12-01 NOTE — PROGRESS NOTES
Subjective:         Patient ID: Natali Claire is a 52 y.o. female.    Chief Complaint: Low-back Pain and Shoulder Pain      Pain  This is a chronic problem. The current episode started more than 1 year ago. The problem occurs daily. The problem has been waxing and waning. Associated symptoms include arthralgias and neck pain. Pertinent negatives include no anorexia, change in bowel habit, chest pain, chills, coughing, diaphoresis, fever, sore throat, vertigo or vomiting.   Review of Systems   Constitutional:  Negative for activity change, chills, diaphoresis, fever and unexpected weight change.   HENT:  Negative for drooling, ear discharge, ear pain, facial swelling, nosebleeds, sore throat, trouble swallowing, voice change and goiter.    Eyes:  Negative for photophobia, pain, discharge, redness and visual disturbance.   Respiratory:  Negative for apnea, cough, choking, chest tightness, shortness of breath, wheezing and stridor.    Cardiovascular:  Negative for chest pain, palpitations and leg swelling.   Gastrointestinal:  Negative for abdominal distention, anorexia, change in bowel habit, diarrhea, rectal pain, vomiting, fecal incontinence and change in bowel habit.   Endocrine: Negative for cold intolerance, heat intolerance, polydipsia, polyphagia and polyuria.   Genitourinary:  Negative for bladder incontinence, dysuria, flank pain, frequency and hot flashes.   Musculoskeletal:  Positive for arthralgias, back pain, leg pain and neck pain.   Integumentary:  Negative for color change and pallor.   Allergic/Immunologic: Negative for immunocompromised state.   Neurological:  Negative for dizziness, vertigo, seizures, syncope, facial asymmetry, speech difficulty, light-headedness, coordination difficulties, memory loss and coordination difficulties.   Hematological:  Negative for adenopathy. Does not bruise/bleed easily.   Psychiatric/Behavioral:  Negative for agitation, behavioral problems, confusion, decreased  "concentration, dysphoric mood, hallucinations, self-injury and suicidal ideas. The patient is not nervous/anxious and is not hyperactive.          Past Medical History:   Diagnosis Date    Anxiety     Arthritis     Chronic low back pain     Chronic pain syndrome     Degenerative joint disease involving multiple joints     Diabetes mellitus, type 2     GERD (gastroesophageal reflux disease)     Hyperlipidemia     Hypertension     Lumbar radiculopathy     Lumbosacral spondylosis     Multiple joint pain     Onychomycosis     Osteoarthritis     Other long term (current) drug therapy     Rheumatoid arthritis      Past Surgical History:   Procedure Laterality Date    BREAST SURGERY      CHOLECYSTECTOMY      TUBAL LIGATION       Social History     Socioeconomic History    Marital status: Single   Tobacco Use    Smoking status: Never    Smokeless tobacco: Never   Substance and Sexual Activity    Alcohol use: Not Currently    Drug use: Never    Sexual activity: Yes     Partners: Male     Family History   Problem Relation Age of Onset    Diabetes Mother     Heart disease Father     Diabetes Sister     Breast cancer Paternal Cousin      Review of patient's allergies indicates:  No Known Allergies     Objective:  Vitals:    12/06/22 1253   BP: (!) 157/92   Pulse: 64   Resp: 16   Weight: 134.3 kg (296 lb)   Height: 5' 9" (1.753 m)   PainSc:   4         Physical Exam  Vitals and nursing note reviewed. Exam conducted with a chaperone present.   Constitutional:       General: She is awake. She is not in acute distress.     Appearance: Normal appearance. She is not ill-appearing or toxic-appearing.   HENT:      Head: Normocephalic and atraumatic.      Nose: Nose normal.      Mouth/Throat:      Mouth: Mucous membranes are moist.      Pharynx: Oropharynx is clear.   Eyes:      Conjunctiva/sclera: Conjunctivae normal.      Pupils: Pupils are equal, round, and reactive to light.   Cardiovascular:      Rate and Rhythm: Normal rate. "   Pulmonary:      Effort: Pulmonary effort is normal. No respiratory distress.   Abdominal:      Palpations: Abdomen is soft.   Musculoskeletal:         General: Normal range of motion.      Cervical back: Normal range of motion and neck supple. Tenderness present.      Thoracic back: Tenderness present.      Lumbar back: Tenderness present.   Skin:     General: Skin is warm and dry.   Neurological:      General: No focal deficit present.      Mental Status: She is alert and oriented to person, place, and time. Mental status is at baseline.      Cranial Nerves: No cranial nerve deficit (II-XII).   Psychiatric:         Mood and Affect: Mood normal.         Behavior: Behavior normal. Behavior is cooperative.         Thought Content: Thought content normal.         Mammo Digital Screening Bilat  Narrative: Result:   Mammo Digital Screening Bilat     History:  Patient is 52 y.o. and is seen for a screening mammogram.    Films Compared:  Compared to: 10/26/2021 Mammo Digital Diagnostic Left and 10/05/2021 Mammo   Digital Screening Bilat     Findings:     The breasts have scattered areas of fibroglandular density. There is no   evidence of suspicious masses, calcifications, or other abnormal findings.  Impression: Bilateral  There is no mammographic evidence of malignancy.    BI-RADS Category:   Overall: 2 - Benign       Recommendation:  Routine screening mammogram in 1 year is recommended.    Your estimated lifetime risk of breast cancer (to age 85) based on   Tyrer-Cuzick risk assessment model is Tyrer-Cuzick: 9.57 %. According to   the [employer], patients with a lifetime breast cancer risk   of 20% or higher might benefit from supplemental screening tests.       Office Visit on 10/17/2022   Component Date Value Ref Range Status    Sodium 10/17/2022 139  136 - 145 mmol/L Final    Potassium 10/17/2022 3.6  3.5 - 5.1 mmol/L Final    Chloride 10/17/2022 103  98 - 107 mmol/L Final    CO2 10/17/2022 32  21 - 32  mmol/L Final    Anion Gap 10/17/2022 8  7 - 16 mmol/L Final    Glucose 10/17/2022 137 (H)  74 - 106 mg/dL Final    BUN 10/17/2022 10  7 - 18 mg/dL Final    Creatinine 10/17/2022 0.93  0.55 - 1.02 mg/dL Final    BUN/Creatinine Ratio 10/17/2022 11  6 - 20 Final    Calcium 10/17/2022 9.0  8.5 - 10.1 mg/dL Final    Total Protein 10/17/2022 8.0  6.4 - 8.2 g/dL Final    Albumin 10/17/2022 3.2 (L)  3.5 - 5.0 g/dL Final    Globulin 10/17/2022 4.8 (H)  2.0 - 4.0 g/dL Final    A/G Ratio 10/17/2022 0.7   Final    Bilirubin, Total 10/17/2022 0.4  >0.0 - 1.2 mg/dL Final    Alk Phos 10/17/2022 147 (H)  41 - 108 U/L Final    ALT 10/17/2022 33  13 - 56 U/L Final    AST 10/17/2022 24  15 - 37 U/L Final    eGFR 10/17/2022 74  >=60 mL/min/1.73m² Final    Triglycerides 10/17/2022 189 (H)  35 - 150 mg/dL Final    Cholesterol 10/17/2022 151  0 - 200 mg/dL Final    HDL Cholesterol 10/17/2022 33 (L)  40 - 60 mg/dL Final    Cholesterol/HDL Ratio (Risk Factor) 10/17/2022 4.6   Final    Non-HDL 10/17/2022 118  mg/dL Final    LDL Calculated 10/17/2022 80  mg/dL Final    LDL/HDL 10/17/2022 2.4   Final    VLDL 10/17/2022 38  mg/dL Final    Hemoglobin A1C 10/17/2022 8.3 (H)  4.5 - 6.6 % Final    Estimated Average Glucose 10/17/2022 190  mg/dL Final    WBC 10/17/2022 5.16  4.50 - 11.00 K/uL Final    RBC 10/17/2022 4.25  4.20 - 5.40 M/uL Final    Hemoglobin 10/17/2022 12.4  12.0 - 16.0 g/dL Final    Hematocrit 10/17/2022 37.5 (L)  38.0 - 47.0 % Final    MCV 10/17/2022 88.2  80.0 - 96.0 fL Final    MCH 10/17/2022 29.2  27.0 - 31.0 pg Final    MCHC 10/17/2022 33.1  32.0 - 36.0 g/dL Final    RDW 10/17/2022 14.7 (H)  11.5 - 14.5 % Final    Platelet Count 10/17/2022 317  150 - 400 K/uL Final    MPV 10/17/2022 10.5  9.4 - 12.4 fL Final    Neutrophils % 10/17/2022 53.0  53.0 - 65.0 % Final    Lymphocytes % 10/17/2022 35.1  27.0 - 41.0 % Final    Monocytes % 10/17/2022 6.0  2.0 - 6.0 % Final    Eosinophils % 10/17/2022 4.3 (H)  1.0 - 4.0 % Final     Basophils % 10/17/2022 1.4 (H)  0.0 - 1.0 % Final    Immature Granulocytes % 10/17/2022 0.2  0.0 - 0.4 % Final    nRBC, Auto 10/17/2022 0.0  <=0.0 % Final    Neutrophils, Abs 10/17/2022 2.74  1.80 - 7.70 K/uL Final    Lymphocytes, Absolute 10/17/2022 1.81  1.00 - 4.80 K/uL Final    Monocytes, Absolute 10/17/2022 0.31  0.00 - 0.80 K/uL Final    Eosinophils, Absolute 10/17/2022 0.22  0.00 - 0.50 K/uL Final    Basophils, Absolute 10/17/2022 0.07  0.00 - 0.20 K/uL Final    Immature Granulocytes, Absolute 10/17/2022 0.01  0.00 - 0.04 K/uL Final    nRBC, Absolute 10/17/2022 0.00  <=0.00 x10e3/uL Final    Diff Type 10/17/2022 Auto   Final   Office Visit on 08/01/2022   Component Date Value Ref Range Status    POC Amphetamines 08/01/2022 Negative  Negative, Inconclusive Final    POC Barbiturates 08/01/2022 Negative  Negative, Inconclusive Final    POC Benzodiazepines 08/01/2022 Negative  Negative, Inconclusive Final    POC Cocaine 08/01/2022 Negative  Negative, Inconclusive Final    POC THC 08/01/2022 Negative  Negative, Inconclusive Final    POC Methadone 08/01/2022 Negative  Negative, Inconclusive Final    POC Methamphetamine 08/01/2022 Negative  Negative, Inconclusive Final    POC Opiates 08/01/2022 Presumptive Positive (A)  Negative, Inconclusive Final    POC Oxycodone 08/01/2022 Negative  Negative, Inconclusive Final    POC Phencyclidine 08/01/2022 Negative  Negative, Inconclusive Final    POC Methylenedioxymethamphetamine * 08/01/2022 Negative  Negative, Inconclusive Final    POC Tricyclic Antidepressants 08/01/2022 Negative  Negative, Inconclusive Final    POC Buprenorphine 08/01/2022 Negative   Final     Acceptable 08/01/2022 Yes   Final    POC Temperature (Urine) 08/01/2022 90   Final         Orders Placed This Encounter   Procedures    POCT Urine Drug Screen Presump     Interpretive Information:     Negative:  No drug detected at the cut off level.   Positive:  This result represents presumptive  positive for the   tested drug, other substances may yield a positive response other   than the analyte of interest. This result should be utilized for   diagnostic purpose only. Confirmation testing will be performed upon physician request only.            Requested Prescriptions     Signed Prescriptions Disp Refills    HYDROcodone-acetaminophen (NORCO)  mg per tablet 60 tablet 0     Sig: Take 1 tablet by mouth every 12 (twelve) hours as needed for Pain.    naloxone (NARCAN) 4 mg/actuation Spry 1 each 0     Si spray (4 mg total) by Nasal route once. for 1 dose    HYDROcodone-acetaminophen (NORCO)  mg per tablet 60 tablet 0     Sig: Take 1 tablet by mouth every 12 (twelve) hours as needed for Pain.       Assessment:     1. Rheumatoid arthritis of multiple sites without rheumatoid factor    2. Cervical radiculopathy    3. Encounter for long-term (current) use of other medications         A's of Opioid Risk Assessment  Activity:Patient can perform ADL.   Analgesia:Patients pain is partially controlled by current medication. Patient has tried OTC medications such as Tylenol and Ibuprofen with out relief.   Adverse Effects: Patient denies constipation or sedation.  Aberrant Behavior:  reviewed with no aberrant drug seeking/taking behavior.  Overdose reversal drug naloxone discussed    Drug screen reviewed      Plan:    Follows rheumatology rheumatoid arthritis [facility]    Requesting Toradol injection for joint back pain    Toradol 60 mg IM, tolerated well    Otherwise she states current medications helping control her discomfort     She would like to continue with conservative management     She states her lumbosacral radiculopathy symptoms continue she is considering MRI lumbar spine    Continue current medication    Follow-up 2 months    Dr. Claire, 2023    Bring original prescription medication bottles/container/box with labels to each visit    Pill count    Physical therapy prescription October  2022 [facility]    Massage therapy [facility]

## 2022-12-06 ENCOUNTER — OFFICE VISIT (OUTPATIENT)
Dept: PAIN MEDICINE | Facility: CLINIC | Age: 52
End: 2022-12-06
Payer: MEDICARE

## 2022-12-06 VITALS
BODY MASS INDEX: 43.4 KG/M2 | HEART RATE: 64 BPM | HEIGHT: 69 IN | DIASTOLIC BLOOD PRESSURE: 92 MMHG | RESPIRATION RATE: 16 BRPM | SYSTOLIC BLOOD PRESSURE: 157 MMHG | WEIGHT: 293 LBS

## 2022-12-06 DIAGNOSIS — Z79.899 ENCOUNTER FOR LONG-TERM (CURRENT) USE OF OTHER MEDICATIONS: ICD-10-CM

## 2022-12-06 DIAGNOSIS — M54.12 CERVICAL RADICULOPATHY: Chronic | ICD-10-CM

## 2022-12-06 DIAGNOSIS — M06.09 RHEUMATOID ARTHRITIS OF MULTIPLE SITES WITHOUT RHEUMATOID FACTOR: Primary | Chronic | ICD-10-CM

## 2022-12-06 PROCEDURE — 99214 PR OFFICE/OUTPT VISIT, EST, LEVL IV, 30-39 MIN: ICD-10-PCS | Mod: S$PBB,25,, | Performed by: PHYSICIAN ASSISTANT

## 2022-12-06 PROCEDURE — 3077F PR MOST RECENT SYSTOLIC BLOOD PRESSURE >= 140 MM HG: ICD-10-PCS | Mod: CPTII,,, | Performed by: PHYSICIAN ASSISTANT

## 2022-12-06 PROCEDURE — 99214 OFFICE O/P EST MOD 30 MIN: CPT | Mod: S$PBB,25,, | Performed by: PHYSICIAN ASSISTANT

## 2022-12-06 PROCEDURE — 3008F BODY MASS INDEX DOCD: CPT | Mod: CPTII,,, | Performed by: PHYSICIAN ASSISTANT

## 2022-12-06 PROCEDURE — 1159F MED LIST DOCD IN RCRD: CPT | Mod: CPTII,,, | Performed by: PHYSICIAN ASSISTANT

## 2022-12-06 PROCEDURE — 1159F PR MEDICATION LIST DOCUMENTED IN MEDICAL RECORD: ICD-10-PCS | Mod: CPTII,,, | Performed by: PHYSICIAN ASSISTANT

## 2022-12-06 PROCEDURE — 3080F DIAST BP >= 90 MM HG: CPT | Mod: CPTII,,, | Performed by: PHYSICIAN ASSISTANT

## 2022-12-06 PROCEDURE — 3080F PR MOST RECENT DIASTOLIC BLOOD PRESSURE >= 90 MM HG: ICD-10-PCS | Mod: CPTII,,, | Performed by: PHYSICIAN ASSISTANT

## 2022-12-06 PROCEDURE — 99215 OFFICE O/P EST HI 40 MIN: CPT | Mod: PBBFAC,25 | Performed by: PHYSICIAN ASSISTANT

## 2022-12-06 PROCEDURE — 3052F PR MOST RECENT HEMOGLOBIN A1C LEVEL 8.0 - < 9.0%: ICD-10-PCS | Mod: CPTII,,, | Performed by: PHYSICIAN ASSISTANT

## 2022-12-06 PROCEDURE — 3008F PR BODY MASS INDEX (BMI) DOCUMENTED: ICD-10-PCS | Mod: CPTII,,, | Performed by: PHYSICIAN ASSISTANT

## 2022-12-06 PROCEDURE — 80305 DRUG TEST PRSMV DIR OPT OBS: CPT | Mod: PBBFAC | Performed by: PHYSICIAN ASSISTANT

## 2022-12-06 PROCEDURE — 96372 THER/PROPH/DIAG INJ SC/IM: CPT | Mod: PBBFAC | Performed by: PHYSICIAN ASSISTANT

## 2022-12-06 PROCEDURE — 3077F SYST BP >= 140 MM HG: CPT | Mod: CPTII,,, | Performed by: PHYSICIAN ASSISTANT

## 2022-12-06 PROCEDURE — 3052F HG A1C>EQUAL 8.0%<EQUAL 9.0%: CPT | Mod: CPTII,,, | Performed by: PHYSICIAN ASSISTANT

## 2022-12-06 RX ORDER — HYDROCODONE BITARTRATE AND ACETAMINOPHEN 10; 325 MG/1; MG/1
1 TABLET ORAL EVERY 12 HOURS PRN
Qty: 60 TABLET | Refills: 0 | Status: SHIPPED | OUTPATIENT
Start: 2023-01-07 | End: 2023-02-06 | Stop reason: SDUPTHER

## 2022-12-06 RX ORDER — NALOXONE HYDROCHLORIDE 4 MG/.1ML
1 SPRAY NASAL ONCE
Qty: 1 EACH | Refills: 0 | Status: SHIPPED | OUTPATIENT
Start: 2022-12-06 | End: 2022-12-06

## 2022-12-06 RX ORDER — KETOROLAC TROMETHAMINE 30 MG/ML
60 INJECTION, SOLUTION INTRAMUSCULAR; INTRAVENOUS
Status: COMPLETED | OUTPATIENT
Start: 2022-12-06 | End: 2022-12-06

## 2022-12-06 RX ORDER — HYDROCODONE BITARTRATE AND ACETAMINOPHEN 10; 325 MG/1; MG/1
1 TABLET ORAL EVERY 12 HOURS PRN
Qty: 60 TABLET | Refills: 0 | Status: SHIPPED | OUTPATIENT
Start: 2022-12-08 | End: 2023-01-07

## 2022-12-06 RX ADMIN — KETOROLAC TROMETHAMINE 60 MG: 30 INJECTION, SOLUTION INTRAMUSCULAR at 01:12

## 2023-01-17 ENCOUNTER — HOSPITAL ENCOUNTER (OUTPATIENT)
Dept: RADIOLOGY | Facility: HOSPITAL | Age: 53
Discharge: HOME | End: 2023-01-17
Attending: OBSTETRICS & GYNECOLOGY
Payer: COMMERCIAL

## 2023-01-17 ENCOUNTER — OFFICE VISIT (OUTPATIENT)
Dept: OBSTETRICS AND GYNECOLOGY | Facility: CLINIC | Age: 53
End: 2023-01-17
Payer: COMMERCIAL

## 2023-01-17 VITALS
DIASTOLIC BLOOD PRESSURE: 74 MMHG | WEIGHT: 207.44 LBS | SYSTOLIC BLOOD PRESSURE: 114 MMHG | BODY MASS INDEX: 33.34 KG/M2 | HEIGHT: 66 IN

## 2023-01-17 DIAGNOSIS — Z01.419 GYNECOLOGIC EXAM NORMAL: Primary | ICD-10-CM

## 2023-01-17 DIAGNOSIS — Z12.31 VISIT FOR SCREENING MAMMOGRAM: ICD-10-CM

## 2023-01-17 DIAGNOSIS — N95.1 MENOPAUSAL SYNDROME: ICD-10-CM

## 2023-01-17 PROCEDURE — 77067 SCR MAMMO BI INCL CAD: CPT | Mod: TC,PN

## 2023-01-17 PROCEDURE — 99999 PR PBB SHADOW E&M-EST. PATIENT-LVL III: ICD-10-PCS | Mod: PBBFAC,,, | Performed by: OBSTETRICS & GYNECOLOGY

## 2023-01-17 PROCEDURE — 99396 PR PREVENTIVE VISIT,EST,40-64: ICD-10-PCS | Mod: S$GLB,,, | Performed by: OBSTETRICS & GYNECOLOGY

## 2023-01-17 PROCEDURE — 77067 MAMMO DIGITAL SCREENING BILAT WITH TOMO: ICD-10-PCS | Mod: 26,,, | Performed by: RADIOLOGY

## 2023-01-17 PROCEDURE — 99999 PR PBB SHADOW E&M-EST. PATIENT-LVL III: CPT | Mod: PBBFAC,,, | Performed by: OBSTETRICS & GYNECOLOGY

## 2023-01-17 PROCEDURE — 88175 CYTOPATH C/V AUTO FLUID REDO: CPT | Performed by: OBSTETRICS & GYNECOLOGY

## 2023-01-17 PROCEDURE — 77063 BREAST TOMOSYNTHESIS BI: CPT | Mod: 26,,, | Performed by: RADIOLOGY

## 2023-01-17 PROCEDURE — 99396 PREV VISIT EST AGE 40-64: CPT | Mod: S$GLB,,, | Performed by: OBSTETRICS & GYNECOLOGY

## 2023-01-17 PROCEDURE — 77067 SCR MAMMO BI INCL CAD: CPT | Mod: 26,,, | Performed by: RADIOLOGY

## 2023-01-17 PROCEDURE — 77063 MAMMO DIGITAL SCREENING BILAT WITH TOMO: ICD-10-PCS | Mod: 26,,, | Performed by: RADIOLOGY

## 2023-01-17 NOTE — PROGRESS NOTES
Chief Complaint   Patient presents with    Well Woman    Mammogram       History and Physical:  Patient's last menstrual period was 2023 (approximate).       Matilda Decker is a 52 y.o.  -American Female who presents today for her routine annual GYN exam. The patient has no Gynecology complaints today. Reports menses on oral contraceptive pills - counseled, recommedned FSH 2 weeks off of pills.         Allergies: Review of patient's allergies indicates:  No Known Allergies    Past Medical History:   Diagnosis Date    Abnormal Pap smear     repeat pap       Past Surgical History:   Procedure Laterality Date    CERVICAL BIOPSY  W/ LOOP ELECTRODE EXCISION      COLPOSCOPY         MEDS:   Current Outpatient Medications on File Prior to Visit   Medication Sig Dispense Refill    fexofenadine (ALLEGRA) 30 MG tablet Take 30 mg by mouth 2 (two) times daily.      L. ACIDOPHILUS/DIG ENZ CMB 5 (PROBIOTIC-DIGESTIVE ENZYMES ORAL) Take by mouth.      noreth-ethinyl estradioL-iron 0.4mg-35mcg(21) and 75 mg (7) Chew Take 1 tablet by mouth once daily. 90 each 0    [DISCONTINUED] norethindrone-ethinyl estradiol (MICROGESTIN ) 1-20 mg-mcg per tablet TAKE 1 TABLET BY MOUTH EVERY DAY (Patient not taking: Reported on 2023) 21 tablet 1     No current facility-administered medications on file prior to visit.       OB History          2    Para   2    Term   1            AB        Living             SAB        IAB        Ectopic        Multiple        Live Births                     Social History     Socioeconomic History    Marital status:    Tobacco Use    Smoking status: Never    Smokeless tobacco: Never   Substance and Sexual Activity    Alcohol use: No    Drug use: No    Sexual activity: Not Currently     Partners: Male     Birth control/protection: OCP       Family History   Problem Relation Age of Onset    Breast cancer Neg Hx     Ovarian cancer Neg Hx          Past medical and  "surgical history reviewed.   I have reviewed the patient's medical history in detail and updated the computerized patient record.    Review of System:   General: no chills, fever, night sweats, weight gain or weight loss  Psychological: no depression or suicidal ideation  Breasts: no new or changing breast lumps, nipple discharge or masses.  Respiratory: no cough, shortness of breath, or wheezing  Cardiovascular: no chest pain or dyspnea on exertion  Gastrointestinal: no abdominal pain, change in bowel habits, or black or bloody stools  Genito-Urinary: no incontinence, urinary frequency/urgency or vulvar/vaginal symptoms, pelvic pain or abnormal vaginal bleeding.  Musculoskeletal: no gait disturbance or muscular weakness      Physical Exam:   /74   Ht 5' 6" (1.676 m)   Wt 94.1 kg (207 lb 7.3 oz)   LMP 01/02/2023 (Approximate)   BMI 33.48 kg/m²   Constitutional: She appears alert and responsive. She appears well-developed, well-groomed, and well-nourished. No distress. OverWeight   HENT:   Head: Normocephalic and atraumatic.   Eyes: Conjunctivae and EOM are normal. No scleral icterus.   Neck: Symmetrical. Normal range of motion. Neck supple. No tracheal deviation present.   Cardiovascular: Normal rate, no rhythm abnormality noted. Extremities without swelling or edema, warm.    Pulmonary/Chest: Normal respiratory Effort. No distress or retractions. She exhibits no tenderness.  Breasts: are symmetrical.   Right breast exhibits no inverted nipple, no mass, no nipple discharge, no skin change and no tenderness.   Left breast exhibits no inverted nipple, no mass, no nipple discharge, no skin change and no tenderness.  Abdominal: Soft. She exhibits no distension, hernias or masses. There is no tenderness. No enlargement of liver edge or spleen.  There is no rebound and no guarding.   Genitourinary:    External rectal exam shows no thrombosed external hemorrhoids, no lesions.     Pelvic exam was performed with " patient supine.   No labial fusion, and symmetrical.    There is no rash, lesion or injury on the right labia.   There is no rash, lesion or injury on the left labia.   No bleeding and no signs of injury around the vaginal introitus, urethral meatus is normal size and without prolapse or lesions, urethra well supported. The cervix is visualized with no discharge, lesions or friability.   No vaginal discharge found.    No significant Cystocele, Enterocele or rectocele, and cervix and uterus well supported.   Bimanual exam:   The urethra is normal to palpation and there are no palpable vaginal wall masses.   Uterus is not deviated, not enlarged, not fixed, normal shape and not tender.   Cervix exhibits no motion tenderness.    Right adnexum displays no mass or nodularity and no tenderness.   Left adnexum displays no mass or nodularity and no tenderness.  Musculoskeletal: Normal range of motion.   Lymphadenopathy: No inguinal adenopathy present.   Neurological: She is alert and oriented to person, place, and time. Coordination normal.   Skin: Skin is warm and dry. She is not diaphoretic. No rashes, lesions or ulcers.   Psychiatric: She has a normal mood and affect, oriented to person, place, and time.      Assessment:   Normal annual GYN exam  1. Gynecologic exam normal  Liquid-Based Pap Smear, Screening      2. Visit for screening mammogram  Mammo Digital Screening Bilat w/ Dennis      3. Menopausal syndrome  Follicle Stimulating Hormone    Estradiol    TSH      Perimenopausal - on oral contraceptive pills     Plan:   PAP  Mammogram  Fsh 2 weeks off of oral contraceptive pills   Follow up in 1 year.  Patient informed will be contacted with results within 2 weeks. Encouraged to please call back or email if she has not heard from us by then.

## 2023-01-22 ENCOUNTER — PATIENT MESSAGE (OUTPATIENT)
Dept: OBSTETRICS AND GYNECOLOGY | Facility: CLINIC | Age: 53
End: 2023-01-22
Payer: COMMERCIAL

## 2023-01-23 ENCOUNTER — PATIENT MESSAGE (OUTPATIENT)
Dept: OBSTETRICS AND GYNECOLOGY | Facility: CLINIC | Age: 53
End: 2023-01-23
Payer: COMMERCIAL

## 2023-02-06 ENCOUNTER — OFFICE VISIT (OUTPATIENT)
Dept: PAIN MEDICINE | Facility: CLINIC | Age: 53
End: 2023-02-06
Payer: MEDICARE

## 2023-02-06 VITALS
WEIGHT: 293 LBS | RESPIRATION RATE: 20 BRPM | SYSTOLIC BLOOD PRESSURE: 162 MMHG | HEIGHT: 69 IN | DIASTOLIC BLOOD PRESSURE: 56 MMHG | BODY MASS INDEX: 43.4 KG/M2 | HEART RATE: 71 BPM

## 2023-02-06 DIAGNOSIS — M54.12 CERVICAL RADICULOPATHY: Chronic | ICD-10-CM

## 2023-02-06 DIAGNOSIS — M06.09 RHEUMATOID ARTHRITIS OF MULTIPLE SITES WITHOUT RHEUMATOID FACTOR: Primary | Chronic | ICD-10-CM

## 2023-02-06 PROCEDURE — 3078F DIAST BP <80 MM HG: CPT | Mod: CPTII,,, | Performed by: PHYSICIAN ASSISTANT

## 2023-02-06 PROCEDURE — 3078F PR MOST RECENT DIASTOLIC BLOOD PRESSURE < 80 MM HG: ICD-10-PCS | Mod: CPTII,,, | Performed by: PHYSICIAN ASSISTANT

## 2023-02-06 PROCEDURE — 99215 OFFICE O/P EST HI 40 MIN: CPT | Mod: PBBFAC | Performed by: PHYSICIAN ASSISTANT

## 2023-02-06 PROCEDURE — 1159F MED LIST DOCD IN RCRD: CPT | Mod: CPTII,,, | Performed by: PHYSICIAN ASSISTANT

## 2023-02-06 PROCEDURE — 3008F PR BODY MASS INDEX (BMI) DOCUMENTED: ICD-10-PCS | Mod: CPTII,,, | Performed by: PHYSICIAN ASSISTANT

## 2023-02-06 PROCEDURE — 99214 OFFICE O/P EST MOD 30 MIN: CPT | Mod: S$PBB,,, | Performed by: PHYSICIAN ASSISTANT

## 2023-02-06 PROCEDURE — 3077F SYST BP >= 140 MM HG: CPT | Mod: CPTII,,, | Performed by: PHYSICIAN ASSISTANT

## 2023-02-06 PROCEDURE — 3008F BODY MASS INDEX DOCD: CPT | Mod: CPTII,,, | Performed by: PHYSICIAN ASSISTANT

## 2023-02-06 PROCEDURE — 1159F PR MEDICATION LIST DOCUMENTED IN MEDICAL RECORD: ICD-10-PCS | Mod: CPTII,,, | Performed by: PHYSICIAN ASSISTANT

## 2023-02-06 PROCEDURE — 3077F PR MOST RECENT SYSTOLIC BLOOD PRESSURE >= 140 MM HG: ICD-10-PCS | Mod: CPTII,,, | Performed by: PHYSICIAN ASSISTANT

## 2023-02-06 PROCEDURE — 99214 PR OFFICE/OUTPT VISIT, EST, LEVL IV, 30-39 MIN: ICD-10-PCS | Mod: S$PBB,,, | Performed by: PHYSICIAN ASSISTANT

## 2023-02-06 RX ORDER — HYDROCODONE BITARTRATE AND ACETAMINOPHEN 10; 325 MG/1; MG/1
1 TABLET ORAL EVERY 12 HOURS PRN
Qty: 60 TABLET | Refills: 0 | Status: SHIPPED | OUTPATIENT
Start: 2023-02-06 | End: 2023-04-03 | Stop reason: SDUPTHER

## 2023-02-06 RX ORDER — HYDROCODONE BITARTRATE AND ACETAMINOPHEN 10; 325 MG/1; MG/1
1 TABLET ORAL EVERY 12 HOURS PRN
Qty: 60 TABLET | Refills: 0 | Status: SHIPPED | OUTPATIENT
Start: 2023-03-08 | End: 2023-04-03 | Stop reason: SDUPTHER

## 2023-02-06 RX ORDER — NALOXONE HYDROCHLORIDE 4 MG/.1ML
1 SPRAY NASAL ONCE
Qty: 2 EACH | Refills: 0 | Status: SHIPPED | OUTPATIENT
Start: 2023-02-06 | End: 2023-02-06

## 2023-02-06 NOTE — PROGRESS NOTES
Subjective:         Patient ID: Natali Claire is a 52 y.o. female.    Chief Complaint: Neck Pain, Shoulder Pain, and Arm Pain (left)        Pain  This is a chronic problem. The current episode started more than 1 year ago. The problem occurs daily. The problem has been unchanged. Associated symptoms include arthralgias and neck pain. Pertinent negatives include no anorexia, chest pain, chills, coughing, diaphoresis, fever, sore throat, vertigo or vomiting.   Review of Systems   Constitutional:  Negative for activity change, appetite change, chills, diaphoresis, fever and unexpected weight change.   HENT:  Negative for drooling, ear discharge, ear pain, facial swelling, nosebleeds, sore throat, trouble swallowing, voice change and goiter.    Eyes:  Negative for photophobia, pain, discharge, redness and visual disturbance.   Respiratory:  Negative for apnea, cough, choking, chest tightness, shortness of breath, wheezing and stridor.    Cardiovascular:  Negative for chest pain, palpitations and leg swelling.   Gastrointestinal:  Negative for abdominal distention, anorexia, diarrhea, rectal pain, vomiting and fecal incontinence.   Endocrine: Negative for cold intolerance, heat intolerance, polydipsia, polyphagia and polyuria.   Genitourinary:  Negative for bladder incontinence, dysuria, flank pain, frequency and hot flashes.   Musculoskeletal:  Positive for arthralgias, back pain, leg pain and neck pain.   Integumentary:  Negative for color change and pallor.   Allergic/Immunologic: Negative for immunocompromised state.   Neurological:  Negative for dizziness, vertigo, seizures, syncope, facial asymmetry, speech difficulty, light-headedness, coordination difficulties, memory loss and coordination difficulties.   Hematological:  Negative for adenopathy. Does not bruise/bleed easily.   Psychiatric/Behavioral:  Negative for agitation, behavioral problems, confusion, decreased concentration, dysphoric mood, hallucinations,  "self-injury and suicidal ideas. The patient is not nervous/anxious and is not hyperactive.          Past Medical History:   Diagnosis Date    Anxiety     Arthritis     Chronic low back pain     Chronic pain syndrome     Degenerative joint disease involving multiple joints     Diabetes mellitus, type 2     GERD (gastroesophageal reflux disease)     Hyperlipidemia     Hypertension     Lumbar radiculopathy     Lumbosacral spondylosis     Multiple joint pain     Onychomycosis     Osteoarthritis     Other long term (current) drug therapy     Rheumatoid arthritis      Past Surgical History:   Procedure Laterality Date    BREAST SURGERY      CHOLECYSTECTOMY      TUBAL LIGATION       Social History     Socioeconomic History    Marital status: Single   Tobacco Use    Smoking status: Never    Smokeless tobacco: Never   Substance and Sexual Activity    Alcohol use: Not Currently    Drug use: Never    Sexual activity: Yes     Partners: Male     Family History   Problem Relation Age of Onset    Diabetes Mother     Heart disease Father     Diabetes Sister     Breast cancer Paternal Cousin      Review of patient's allergies indicates:  No Known Allergies     Objective:  Vitals:    02/06/23 1257 02/06/23 1259   BP: (!) 162/56    Pulse: 71    Resp: 20    Weight: (!) 136.5 kg (301 lb)    Height: 5' 9" (1.753 m)    PainSc:   4   4         Physical Exam  Vitals and nursing note reviewed. Exam conducted with a chaperone present.   Constitutional:       General: She is awake. She is not in acute distress.     Appearance: Normal appearance. She is not ill-appearing, toxic-appearing or diaphoretic.   HENT:      Head: Normocephalic and atraumatic.      Nose: Nose normal.      Mouth/Throat:      Mouth: Mucous membranes are moist.      Pharynx: Oropharynx is clear.   Eyes:      Conjunctiva/sclera: Conjunctivae normal.      Pupils: Pupils are equal, round, and reactive to light.   Cardiovascular:      Rate and Rhythm: Normal rate.   Pulmonary: "      Effort: Pulmonary effort is normal. No respiratory distress.   Abdominal:      Palpations: Abdomen is soft.   Musculoskeletal:         General: Normal range of motion.      Cervical back: Normal range of motion and neck supple. Tenderness present.      Thoracic back: Tenderness present.      Lumbar back: Tenderness present.   Skin:     General: Skin is warm and dry.   Neurological:      General: No focal deficit present.      Mental Status: She is alert and oriented to person, place, and time. Mental status is at baseline.      Cranial Nerves: No cranial nerve deficit (II-XII).   Psychiatric:         Mood and Affect: Mood normal.         Behavior: Behavior normal. Behavior is cooperative.         Thought Content: Thought content normal.         Mammo Digital Screening Bilat  Narrative: Result:   Mammo Digital Screening Bilat     History:  Patient is 52 y.o. and is seen for a screening mammogram.    Films Compared:  Compared to: 10/26/2021 Mammo Digital Diagnostic Left and 10/05/2021 Mammo   Digital Screening Bilat     Findings:     The breasts have scattered areas of fibroglandular density. There is no   evidence of suspicious masses, calcifications, or other abnormal findings.  Impression: Bilateral  There is no mammographic evidence of malignancy.    BI-RADS Category:   Overall: 2 - Benign       Recommendation:  Routine screening mammogram in 1 year is recommended.    Your estimated lifetime risk of breast cancer (to age 85) based on   Tyrer-Cuzick risk assessment model is Tyrer-Cuzick: 9.57 %. According to   the American Cancer Society, patients with a lifetime breast cancer risk   of 20% or higher might benefit from supplemental screening tests.         Office Visit on 12/06/2022   Component Date Value Ref Range Status    POC Amphetamines 12/06/2022 Negative  Negative, Inconclusive Final    POC Barbiturates 12/06/2022 Negative  Negative, Inconclusive Final    POC Benzodiazepines 12/06/2022 Negative   Negative, Inconclusive Final    POC Cocaine 12/06/2022 Negative  Negative, Inconclusive Final    POC THC 12/06/2022 Negative  Negative, Inconclusive Final    POC Methadone 12/06/2022 Negative  Negative, Inconclusive Final    POC Methamphetamine 12/06/2022 Negative  Negative, Inconclusive Final    POC Opiates 12/06/2022 Presumptive Positive (A)  Negative, Inconclusive Final    POC Oxycodone 12/06/2022 Negative  Negative, Inconclusive Final    POC Phencyclidine 12/06/2022 Negative  Negative, Inconclusive Final    POC Methylenedioxymethamphetamine * 12/06/2022 Negative  Negative, Inconclusive Final    POC Tricyclic Antidepressants 12/06/2022 Negative  Negative, Inconclusive Final    POC Buprenorphine 12/06/2022 Negative   Final     Acceptable 12/06/2022 Yes   Final    POC Temperature (Urine) 12/06/2022 90   Final   Office Visit on 10/17/2022   Component Date Value Ref Range Status    Sodium 10/17/2022 139  136 - 145 mmol/L Final    Potassium 10/17/2022 3.6  3.5 - 5.1 mmol/L Final    Chloride 10/17/2022 103  98 - 107 mmol/L Final    CO2 10/17/2022 32  21 - 32 mmol/L Final    Anion Gap 10/17/2022 8  7 - 16 mmol/L Final    Glucose 10/17/2022 137 (H)  74 - 106 mg/dL Final    BUN 10/17/2022 10  7 - 18 mg/dL Final    Creatinine 10/17/2022 0.93  0.55 - 1.02 mg/dL Final    BUN/Creatinine Ratio 10/17/2022 11  6 - 20 Final    Calcium 10/17/2022 9.0  8.5 - 10.1 mg/dL Final    Total Protein 10/17/2022 8.0  6.4 - 8.2 g/dL Final    Albumin 10/17/2022 3.2 (L)  3.5 - 5.0 g/dL Final    Globulin 10/17/2022 4.8 (H)  2.0 - 4.0 g/dL Final    A/G Ratio 10/17/2022 0.7   Final    Bilirubin, Total 10/17/2022 0.4  >0.0 - 1.2 mg/dL Final    Alk Phos 10/17/2022 147 (H)  41 - 108 U/L Final    ALT 10/17/2022 33  13 - 56 U/L Final    AST 10/17/2022 24  15 - 37 U/L Final    eGFR 10/17/2022 74  >=60 mL/min/1.73m² Final    Triglycerides 10/17/2022 189 (H)  35 - 150 mg/dL Final    Cholesterol 10/17/2022 151  0 - 200 mg/dL Final    HDL  Cholesterol 10/17/2022 33 (L)  40 - 60 mg/dL Final    Cholesterol/HDL Ratio (Risk Factor) 10/17/2022 4.6   Final    Non-HDL 10/17/2022 118  mg/dL Final    LDL Calculated 10/17/2022 80  mg/dL Final    LDL/HDL 10/17/2022 2.4   Final    VLDL 10/17/2022 38  mg/dL Final    Hemoglobin A1C 10/17/2022 8.3 (H)  4.5 - 6.6 % Final    Estimated Average Glucose 10/17/2022 190  mg/dL Final    WBC 10/17/2022 5.16  4.50 - 11.00 K/uL Final    RBC 10/17/2022 4.25  4.20 - 5.40 M/uL Final    Hemoglobin 10/17/2022 12.4  12.0 - 16.0 g/dL Final    Hematocrit 10/17/2022 37.5 (L)  38.0 - 47.0 % Final    MCV 10/17/2022 88.2  80.0 - 96.0 fL Final    MCH 10/17/2022 29.2  27.0 - 31.0 pg Final    MCHC 10/17/2022 33.1  32.0 - 36.0 g/dL Final    RDW 10/17/2022 14.7 (H)  11.5 - 14.5 % Final    Platelet Count 10/17/2022 317  150 - 400 K/uL Final    MPV 10/17/2022 10.5  9.4 - 12.4 fL Final    Neutrophils % 10/17/2022 53.0  53.0 - 65.0 % Final    Lymphocytes % 10/17/2022 35.1  27.0 - 41.0 % Final    Monocytes % 10/17/2022 6.0  2.0 - 6.0 % Final    Eosinophils % 10/17/2022 4.3 (H)  1.0 - 4.0 % Final    Basophils % 10/17/2022 1.4 (H)  0.0 - 1.0 % Final    Immature Granulocytes % 10/17/2022 0.2  0.0 - 0.4 % Final    nRBC, Auto 10/17/2022 0.0  <=0.0 % Final    Neutrophils, Abs 10/17/2022 2.74  1.80 - 7.70 K/uL Final    Lymphocytes, Absolute 10/17/2022 1.81  1.00 - 4.80 K/uL Final    Monocytes, Absolute 10/17/2022 0.31  0.00 - 0.80 K/uL Final    Eosinophils, Absolute 10/17/2022 0.22  0.00 - 0.50 K/uL Final    Basophils, Absolute 10/17/2022 0.07  0.00 - 0.20 K/uL Final    Immature Granulocytes, Absolute 10/17/2022 0.01  0.00 - 0.04 K/uL Final    nRBC, Absolute 10/17/2022 0.00  <=0.00 x10e3/uL Final    Diff Type 10/17/2022 Auto   Final         No orders of the defined types were placed in this encounter.        Requested Prescriptions     Pending Prescriptions Disp Refills    naloxone (NARCAN) 4 mg/actuation Spry 2 each 0     Si spray (4 mg total) by  Nasal route once. Call 911, One sprays alternate nostril, may repeat 2-3 minutes as needed for 1 dose    HYDROcodone-acetaminophen (NORCO)  mg per tablet 60 tablet 0     Sig: Take 1 tablet by mouth every 12 (twelve) hours as needed for Pain.    HYDROcodone-acetaminophen (NORCO)  mg per tablet 60 tablet 0     Sig: Take 1 tablet by mouth every 12 (twelve) hours as needed for Pain.       Assessment:     1. Rheumatoid arthritis of multiple sites without rheumatoid factor    2. Cervical radiculopathy         A's of Opioid Risk Assessment  Activity:Patient can perform ADL.   Analgesia:Patients pain is partially controlled by current medication. Patient has tried OTC medications such as Tylenol and Ibuprofen with out relief.   Adverse Effects: Patient denies constipation or sedation.  Aberrant Behavior:  reviewed with no aberrant drug seeking/taking behavior.  Overdose reversal drug naloxone discussed    Drug screen reviewed      Plan:    Narcan February 2023    Follows rheumatology rheumatoid arthritis ARM    She states she is doing current medication is helping control her discomfort    She would like to continue with conservative management     Continue home exercise program as directed    Continue current medication    Follow-up 2 months    Dr. Claire, August 2023    Bring original prescription medication bottles/container/box with labels to each visit    Pill count    Physical therapy prescription October 2022 [facility]    Massage therapy [facility]

## 2023-02-15 ENCOUNTER — LAB VISIT (OUTPATIENT)
Dept: LAB | Facility: HOSPITAL | Age: 53
End: 2023-02-15
Attending: OBSTETRICS & GYNECOLOGY
Payer: COMMERCIAL

## 2023-02-15 DIAGNOSIS — N95.1 MENOPAUSAL SYNDROME: ICD-10-CM

## 2023-02-15 LAB
ESTRADIOL SERPL-MCNC: 194 PG/ML
FSH SERPL-ACNC: 4.65 MIU/ML
TSH SERPL DL<=0.005 MIU/L-ACNC: 0.93 UIU/ML (ref 0.4–4)

## 2023-02-15 PROCEDURE — 82670 ASSAY OF TOTAL ESTRADIOL: CPT | Performed by: OBSTETRICS & GYNECOLOGY

## 2023-02-15 PROCEDURE — 36415 COLL VENOUS BLD VENIPUNCTURE: CPT | Mod: PN | Performed by: OBSTETRICS & GYNECOLOGY

## 2023-02-15 PROCEDURE — 83001 ASSAY OF GONADOTROPIN (FSH): CPT | Performed by: OBSTETRICS & GYNECOLOGY

## 2023-02-15 PROCEDURE — 84443 ASSAY THYROID STIM HORMONE: CPT | Performed by: OBSTETRICS & GYNECOLOGY

## 2023-02-16 ENCOUNTER — PATIENT MESSAGE (OUTPATIENT)
Dept: OBSTETRICS AND GYNECOLOGY | Facility: CLINIC | Age: 53
End: 2023-02-16
Payer: COMMERCIAL

## 2023-02-20 ENCOUNTER — OFFICE VISIT (OUTPATIENT)
Dept: PRIMARY CARE CLINIC | Facility: CLINIC | Age: 53
End: 2023-02-20
Payer: MEDICARE

## 2023-02-20 ENCOUNTER — PATIENT OUTREACH (OUTPATIENT)
Dept: PRIMARY CARE CLINIC | Facility: CLINIC | Age: 53
End: 2023-02-20
Payer: COMMERCIAL

## 2023-02-20 VITALS
SYSTOLIC BLOOD PRESSURE: 132 MMHG | DIASTOLIC BLOOD PRESSURE: 84 MMHG | WEIGHT: 293 LBS | BODY MASS INDEX: 43.4 KG/M2 | HEART RATE: 64 BPM | OXYGEN SATURATION: 98 % | TEMPERATURE: 99 F | RESPIRATION RATE: 20 BRPM | HEIGHT: 69 IN

## 2023-02-20 DIAGNOSIS — K21.9 GASTROESOPHAGEAL REFLUX DISEASE, UNSPECIFIED WHETHER ESOPHAGITIS PRESENT: ICD-10-CM

## 2023-02-20 DIAGNOSIS — Z00.00 ENCOUNTER FOR SUBSEQUENT ANNUAL WELLNESS VISIT (AWV) IN MEDICARE PATIENT: Primary | ICD-10-CM

## 2023-02-20 DIAGNOSIS — E11.9 TYPE 2 DIABETES MELLITUS WITHOUT COMPLICATION, WITHOUT LONG-TERM CURRENT USE OF INSULIN: ICD-10-CM

## 2023-02-20 DIAGNOSIS — Z12.11 SCREENING FOR MALIGNANT NEOPLASM OF COLON: ICD-10-CM

## 2023-02-20 DIAGNOSIS — Z00.00 ENCOUNTER FOR PREVENTIVE HEALTH EXAMINATION: ICD-10-CM

## 2023-02-20 DIAGNOSIS — M05.79 RHEUMATOID ARTHRITIS INVOLVING MULTIPLE SITES WITH POSITIVE RHEUMATOID FACTOR: ICD-10-CM

## 2023-02-20 DIAGNOSIS — E78.5 HYPERLIPIDEMIA, UNSPECIFIED HYPERLIPIDEMIA TYPE: ICD-10-CM

## 2023-02-20 DIAGNOSIS — I10 PRIMARY HYPERTENSION: ICD-10-CM

## 2023-02-20 DIAGNOSIS — G89.4 CHRONIC PAIN SYNDROME: Chronic | ICD-10-CM

## 2023-02-20 DIAGNOSIS — Z01.419 ENCOUNTER FOR CERVICAL PAP SMEAR WITH PELVIC EXAM: ICD-10-CM

## 2023-02-20 LAB — PAP RECOMMENDATION EXT: NORMAL

## 2023-02-20 PROCEDURE — 3079F PR MOST RECENT DIASTOLIC BLOOD PRESSURE 80-89 MM HG: ICD-10-PCS | Mod: ,,, | Performed by: NURSE PRACTITIONER

## 2023-02-20 PROCEDURE — G0439 PR MEDICARE ANNUAL WELLNESS SUBSEQUENT VISIT: ICD-10-PCS | Mod: ,,, | Performed by: NURSE PRACTITIONER

## 2023-02-20 PROCEDURE — 1160F PR REVIEW ALL MEDS BY PRESCRIBER/CLIN PHARMACIST DOCUMENTED: ICD-10-PCS | Mod: ,,, | Performed by: NURSE PRACTITIONER

## 2023-02-20 PROCEDURE — 3008F BODY MASS INDEX DOCD: CPT | Mod: ,,, | Performed by: NURSE PRACTITIONER

## 2023-02-20 PROCEDURE — 3075F PR MOST RECENT SYSTOLIC BLOOD PRESS GE 130-139MM HG: ICD-10-PCS | Mod: ,,, | Performed by: NURSE PRACTITIONER

## 2023-02-20 PROCEDURE — 1159F MED LIST DOCD IN RCRD: CPT | Mod: ,,, | Performed by: NURSE PRACTITIONER

## 2023-02-20 PROCEDURE — 1160F RVW MEDS BY RX/DR IN RCRD: CPT | Mod: ,,, | Performed by: NURSE PRACTITIONER

## 2023-02-20 PROCEDURE — 1159F PR MEDICATION LIST DOCUMENTED IN MEDICAL RECORD: ICD-10-PCS | Mod: ,,, | Performed by: NURSE PRACTITIONER

## 2023-02-20 PROCEDURE — 3075F SYST BP GE 130 - 139MM HG: CPT | Mod: ,,, | Performed by: NURSE PRACTITIONER

## 2023-02-20 PROCEDURE — 3008F PR BODY MASS INDEX (BMI) DOCUMENTED: ICD-10-PCS | Mod: ,,, | Performed by: NURSE PRACTITIONER

## 2023-02-20 PROCEDURE — G0439 PPPS, SUBSEQ VISIT: HCPCS | Mod: ,,, | Performed by: NURSE PRACTITIONER

## 2023-02-20 PROCEDURE — 3079F DIAST BP 80-89 MM HG: CPT | Mod: ,,, | Performed by: NURSE PRACTITIONER

## 2023-02-20 NOTE — PATIENT INSTRUCTIONS
Counseling and Referral of Other Preventative  (Italic type indicates deductible and co-insurance are waived)    Patient Name: Natali Claire  Today's Date: 2/20/2023    Health Maintenance       Date Due Completion Date    Hepatitis C Screening Never done ---    Cervical Cancer Screening Never done ---    COVID-19 Vaccine (1) Never done ---    Diabetes Urine Screening Never done ---    Foot Exam Never done ---    Eye Exam Never done ---    HIV Screening Never done ---    TETANUS VACCINE Never done ---    Colorectal Cancer Screening Never done ---    Influenza Vaccine (1) 09/01/2022 12/1/2021    Hemoglobin A1c 01/17/2023 10/17/2022    Shingles Vaccine (1 of 2) 02/20/2024 (Originally 9/11/1989) ---    Pneumococcal Vaccines (Age 0-64) (1 - PCV) 02/20/2024 (Originally 9/11/1976) ---    Lipid Panel 10/17/2023 10/17/2022    Mammogram 10/18/2023 10/18/2022    Low Dose Statin 02/20/2024 2/20/2023        No orders of the defined types were placed in this encounter.    The following information is provided to all patients.  This information is to help you find resources for any of the problems found today that may be affecting your health:                Living healthy guide: www.[facility].[place].gov      Understanding Diabetes: www.diabetes.org      Eating healthy: www.cdc.gov/healthyweight      CDC home safety checklist: www.cdc.gov/steadi/patient.html      Agency on Aging: www.goea.[place].[facility]      Alcoholics anonymous (AA): www.aa.org      Physical Activity: www.saravanan.nih.gov/bp2mdhb      Tobacco use: www.quitwithusla.org

## 2023-02-20 NOTE — PROGRESS NOTES
.   [facility] URGENT CARE       PATIENT NAME: Natali Claire   : 1970    AGE: 52 y.o. DATE: 2023   MRN: [id number]        Reason for Visit / Chief Complaint: Medicare AWV Follow Up ([employer] Subsequent)        Natali Claire presents for an [employer] AWV today.     The following components were reviewed and updated:    Medical/Social/Family History:  Past Medical History:   Diagnosis Date    Anxiety     Arthritis     Chronic low back pain     Chronic pain syndrome     Degenerative joint disease involving multiple joints     Diabetes mellitus, type 2     GERD (gastroesophageal reflux disease)     Hyperlipidemia     Hypertension     Lumbar radiculopathy     Lumbosacral spondylosis     Multiple joint pain     Onychomycosis     Osteoarthritis     Other long term (current) drug therapy     Rheumatoid arthritis         Family History   Problem Relation Age of Onset    Diabetes Mother     Heart disease Father     Diabetes Sister     Breast cancer Paternal Cousin         Social History     Tobacco Use   Smoking Status Never    Passive exposure: Never   Smokeless Tobacco Never      Social History     Substance and Sexual Activity   Alcohol Use Not Currently       Family History   Problem Relation Age of Onset    Diabetes Mother     Heart disease Father     Diabetes Sister     Breast cancer Paternal Cousin        Past Surgical History:   Procedure Laterality Date    BREAST SURGERY      CHOLECYSTECTOMY      TUBAL LIGATION           Allergies and Current Medications   Review of patient's allergies indicates:  No Known Allergies    Current Outpatient Medications:     atorvastatin (LIPITOR) 10 MG tablet, TAKE 1 TABLET EVERY DAY, Disp: 90 tablet, Rfl: 3    ciclopirox (PENLAC) 8 % Soln, 1 application to affected area, Disp: , Rfl:     cyclobenzaprine (FLEXERIL) 10 MG tablet, Take 1 tablet (10 mg total) by mouth 3 (three) times daily as needed for Muscle spasms., Disp: 90 tablet, Rfl: 2    diclofenac sodium  (VOLTAREN) 1 % Gel, Apply 2 g topically 4 (four) times daily., Disp: 100 g, Rfl: 5    ENBREL SURECLICK 50 mg/mL (1 mL) PnIj, 50 mg every 7 days., Disp: , Rfl:     folic acid (FOLVITE) 1 MG tablet, Take 1 tablet (1 mg total) by mouth once daily., Disp: 30 tablet, Rfl: 5    glimepiride (AMARYL) 4 MG tablet, Take 1 tablet (4 mg total) by mouth before breakfast., Disp: 90 tablet, Rfl: 3    glipiZIDE (GLUCOTROL) 10 MG tablet, 1 tablet, Disp: 30 tablet, Rfl: 5    hydroCHLOROthiazide (HYDRODIURIL) 25 MG tablet, TAKE 1 TABLET BY MOUTH EVERY MORNING, Disp: 90 tablet, Rfl: 3    HYDROcodone-acetaminophen (NORCO)  mg per tablet, Take 1 tablet by mouth every 12 (twelve) hours as needed for Pain., Disp: 60 tablet, Rfl: 0    [START ON 3/8/2023] HYDROcodone-acetaminophen (NORCO)  mg per tablet, Take 1 tablet by mouth every 12 (twelve) hours as needed for Pain., Disp: 60 tablet, Rfl: 0    loratadine (CLARITIN) 10 mg tablet, Take 1 tablet (10 mg total) by mouth once daily., Disp: 90 tablet, Rfl: 3    metFORMIN (GLUCOPHAGE) 500 MG tablet, Take 1 tablet (500 mg total) by mouth 2 (two) times daily with meals., Disp: 180 tablet, Rfl: 3    methotrexate 2.5 MG Tab, 2.5 mg. Patient takes 10 tablet once a week, Disp: , Rfl:     metoprolol tartrate (LOPRESSOR) 50 MG tablet, TAKE 1 TABLET TWICE DAILY, Disp: 180 tablet, Rfl: 3    montelukast (SINGULAIR) 10 mg tablet, Take 1 tablet (10 mg total) by mouth every evening., Disp: 30 tablet, Rfl: 5    nabumetone (RELAFEN) 750 MG tablet, Take 1 tablet (750 mg total) by mouth 2 (two) times daily., Disp: 60 tablet, Rfl: 5    omeprazole (PRILOSEC) 20 MG capsule, 1 capsule 30 minutes before morning meal Strength: 20 mg, Disp: 30 capsule, Rfl: 3    potassium chloride SA (K-DUR,KLOR-CON) 20 MEQ tablet, TAKE 1 TABLET TWICE DAILY, Disp: 180 tablet, Rfl: 3    esomeprazole (NEXIUM) 40 MG capsule, TAKE 1 CAPSULE (40 MG TOTAL) BY MOUTH BEFORE BREAKFAST. (Patient not taking: Reported on 2/20/2023),  Disp: 90 capsule, Rfl: 3    jjpjkmjvg-TN-FX-acetaminophen (DURAFLU) 14--325 mg Tab, Take 1 tablet by mouth every 6 (six) hours while awake. (Patient not taking: Reported on 2/20/2023), Disp: 30 tablet, Rfl: 2    tiZANidine (ZANAFLEX) 4 MG tablet, TAKE 1 TABLET BY MOUTH EVERY 8 HOURS IF NEEDED FOR MUSCLE SPASMS, Disp: , Rfl:     traMADoL (ULTRAM) 50 mg tablet, 1 tablet as needed, Disp: , Rfl:     Health Risk Assessment   Fall Risk: no   Obesity: BMI 44.15     Advance Directive: no- Does not have an advanced directive. Verbal education and written education included in today's AVS.   Depression: PHQ-9=1    HTN: [id number] If yes, DASH diet, exercise, weight management, med compliance, home BP monitoring, and follow-up discussed.   T2DM: yes If yes, diabetic diet, glucose monitoring, activity level, weight management, med compliance, and follow-up discussed.   Tobacco use: no  STI: no    Alcohol misuse: n0   Statin Use: yes      Health Risk Assessment  What is your age?:  (52)  Are you male or female?: Female  During the past four weeks, how much have you been bothered by emotional problems such as feeling anxious, depressed, irritable, sad, or downhearted and blue?: Not at all  During the past five weeks, has your physical and/or emotional health limited your social activities with family, friends, neighbors, or groups?: Not at all  During the past four weeks, how much bodily pain have you generally had?: Mild pain  During the past four weeks, was someone available to help if you needed and wanted help?: Yes, as much as I wanted  During the past four weeks, what was the hardest physical activity you could do for at least two minutes?: Light  Can you get to places out of walking distance without help?  (For example, can you travel alone on buses or taxis, or drive your own car?): Yes  Can you go shopping for groceries or clothes without someone's help?: Yes  Can you prepare your own meals?: Yes  Can you do your  own housework without help?: Yes  Because of any health problems, do you need the help of another person with your personal care needs such as eating, bathing, dressing, or getting around the house?: No  Can you handle your own money without help?: Yes  During the past four weeks, how would you rate your health in general?: Fair  How have things been going for you during the past four weeks?: Pretty well  Are you having difficulties driving your car?: No  Do you always fasten your seat belt when you are in a car?: Yes, usually  How often in the past four weeks have you been bothered by falling or dizzy when standing up?: Sometimes  How often in the past four weeks have you been bothered by sexual problems?: Never  How often in the past four weeks have you been bothered by trouble eating well?: Never  How often in the past four weeks have you been bothered by teeth or denture problems?: Never  How often in the past four weeks have you been bothered with problems using the telephone?: Never  How often in the past four weeks have you been bothered by tiredness or fatigue?: Sometimes  Have you fallen two or more times in the past year?: No  Are you afraid of falling?: No  Are you a smoker?: No  During the past four weeks, how many drinks of wine, beer, or other alcoholic beverages did you have?: No alcohol at all  Do you exercise for about 20 minutes three or more days a week?: Yes, some of the time  Have you been given any information to help you with hazards in your house that might hurt you?: No  Have you been given any information to help you with keeping track of your medications?: No  How often do you have trouble taking medicines the way you've been told to take them?: I always take them as prescribed  How confident are you that you can control and manage most of your health problems?: Very confident    Health Maintenance   Last eye exam: ordered   Last CV screen with lipids: 10/17/2022   Diabetes screening with  fasting glucose or A1c: 10/17/2022   Colonoscopy: ordered   Flu Vaccine: declined   Pneumonia vaccines: declined   COVID vaccine: 3 doses   Hep B vaccine: n/a   DEXA: n/a   Last pap/pelvic: ordered   Last Mammogram: 10/18/2022   Last PSA screen: n/a   AAA screening: n/a (once in lifetime for males 65-75 who have smoked > 100 cigarettes in lifetime)  HIV Screeing: eligible  Hepatitis C Screen: eligible  Low Dose CT Scan: n/a    Health Maintenance Topics with due status: Not Due       Topic Last Completion Date    Lipid Panel 10/17/2022    Mammogram 10/18/2022    Low Dose Statin 02/20/2023     Health Maintenance Due   Topic Date Due    Hepatitis C Screening  Never done    Cervical Cancer Screening  Never done    Diabetes Urine Screening  Never done    Foot Exam  Never done    Eye Exam  Never done    HIV Screening  Never done    TETANUS VACCINE  Never done    Colorectal Cancer Screening  Never done    Influenza Vaccine (1) 09/01/2022    COVID-19 Vaccine (4 - Booster for Moderna series) 01/02/2023    Hemoglobin A1c  01/17/2023       Incontinence  Bowel: no  Bladder: no    Lab results available in Epic or see dates from [facility] above:   Lab Results   Component Value Date    CHOL 151 10/17/2022     Lab Results   Component Value Date    HDL 33 (L) 10/17/2022     Lab Results   Component Value Date    LDLCALC 80 10/17/2022     Lab Results   Component Value Date    TRIG 189 (H) 10/17/2022     Lab Results   Component Value Date    CHOLHDL 4.6 10/17/2022       Lab Results   Component Value Date    HGBA1C 8.3 (H) 10/17/2022       Sodium   Date Value Ref Range Status   10/17/2022 139 136 - 145 mmol/L Final     Potassium   Date Value Ref Range Status   10/17/2022 3.6 3.5 - 5.1 mmol/L Final     Chloride   Date Value Ref Range Status   10/17/2022 103 98 - 107 mmol/L Final     CO2   Date Value Ref Range Status   10/17/2022 32 21 - 32 mmol/L Final     Glucose   Date Value Ref Range Status   10/17/2022 137 (H) 74 - 106 mg/dL Final     BUN  "  Date Value Ref Range Status   10/17/2022 10 7 - 18 mg/dL Final     Creatinine   Date Value Ref Range Status   10/17/2022 0.93 0.55 - 1.02 mg/dL Final     Calcium   Date Value Ref Range Status   10/17/2022 9.0 8.5 - 10.1 mg/dL Final     Total Protein   Date Value Ref Range Status   10/17/2022 8.0 6.4 - 8.2 g/dL Final     Albumin   Date Value Ref Range Status   10/17/2022 3.2 (L) 3.5 - 5.0 g/dL Final     Bilirubin, Total   Date Value Ref Range Status   10/17/2022 0.4 >0.0 - 1.2 mg/dL Final     Alk Phos   Date Value Ref Range Status   10/17/2022 147 (H) 41 - 108 U/L Final     AST   Date Value Ref Range Status   10/17/2022 24 15 - 37 U/L Final     ALT   Date Value Ref Range Status   10/17/2022 33 13 - 56 U/L Final     Anion Gap   Date Value Ref Range Status   10/17/2022 8 7 - 16 mmol/L Final               Care Team   PCP: Herlinda Louis    Eye specialist: Sharyn Zapata   Rheum: Sosa Rebolledo   Pain specialist: Noemi Claire (if other)       **See Completed Assessments for Annual Wellness visit within the encounter summary    The following assessments were completed & reviewed:  Depression Screening  Cognitive function Screening  Timed Get Up Test  Whisper Test  Vision Screen  Health Risk Assessment  Checklist of ADLs and IADLs      Objective  Vitals:    02/20/23 1018   BP: 132/84   Pulse: 64   Resp: 20   Temp: 98.5 °F (36.9 °C)   TempSrc: Oral   SpO2: 98%   Weight: 135.6 kg (299 lb)   Height: 5' 9" (1.753 m)   PainSc:   4   PainLoc: Back      Body mass index is 44.15 kg/m².  Ideal body weight: 66.2 kg (145 lb 15.1 oz)       Physical Exam  Vitals and nursing note reviewed.   Constitutional:       General: She is not in acute distress.     Appearance: Normal appearance. She is not ill-appearing.   HENT:      Head: Normocephalic.      Right Ear: Tympanic membrane, ear canal and external ear normal.      Left Ear: Tympanic membrane, ear canal and external ear normal.      Mouth/Throat:      Mouth: Mucous membranes are " moist.   Eyes:      Extraocular Movements: Extraocular movements intact.      Conjunctiva/sclera: Conjunctivae normal.      Pupils: Pupils are equal, round, and reactive to light.   Cardiovascular:      Rate and Rhythm: Normal rate and regular rhythm.      Heart sounds: Normal heart sounds.   Pulmonary:      Effort: Pulmonary effort is normal.      Breath sounds: Normal breath sounds.   Abdominal:      General: Bowel sounds are normal. There is no distension.      Palpations: Abdomen is soft.      Tenderness: There is no abdominal tenderness.   Musculoskeletal:         General: Normal range of motion.      Cervical back: Normal range of motion and neck supple. No rigidity or tenderness.   Skin:     General: Skin is warm and dry.   Neurological:      Mental Status: She is alert and oriented to person, place, and time.      Gait: Gait normal.   Psychiatric:         Mood and Affect: Mood normal.         Behavior: Behavior normal.         Assessment:     1. Encounter for subsequent annual wellness visit (AWV) in Medicare patient    2. Screening for malignant neoplasm of colon  - Ambulatory referral/consult to Gastroenterology; Future    3. Type 2 diabetes mellitus without complication, without long-term current use of insulin  - Ambulatory referral/consult to Podiatry; Future  - Ambulatory referral/consult to Ophthalmology; Future    4. Encounter for cervical Pap smear with pelvic exam  - Ambulatory referral/consult to Obstetrics / Gynecology; Future    5. Encounter for preventive health examination    6. Rheumatoid arthritis involving multiple sites with positive rheumatoid factor    7. Primary hypertension    8. BMI 40.0-44.9, adult    9. Chronic pain syndrome    10. Gastroesophageal reflux disease, unspecified whether esophagitis present    11. Hyperlipidemia, unspecified hyperlipidemia type         Plan:    Referrals:   Pap smear  Eye exam  Foot exam  Colonoscopy     Advised to call office if does not hear from  anyone with referral appt within 2-3 weeks to check on status of referral. Voiced understanding.    Advised to continue medications as prescribed, monitor glucose as directed,exercise as directed, continue heart health diabetic diet, keep follow up appointments, educational material provided.      Discussed and provided with a screening schedule and personal prevention plan in accordance with USPSTF age appropriate recommendations and Medicare screening guidelines.   Education, counseling, and referrals were provided as needed.  After Visit Summary printed and given to patient which includes written education and a list of any referrals if indicated.     F/u plan for yearly AWV.    Signature: Griselda Rogers DNP, FNP-C

## 2023-02-24 DIAGNOSIS — Z12.11 COLON CANCER SCREENING: Primary | ICD-10-CM

## 2023-02-27 ENCOUNTER — OFFICE VISIT (OUTPATIENT)
Dept: PRIMARY CARE CLINIC | Facility: CLINIC | Age: 53
End: 2023-02-27
Payer: MEDICARE

## 2023-02-27 VITALS
HEIGHT: 69 IN | RESPIRATION RATE: 20 BRPM | WEIGHT: 293 LBS | OXYGEN SATURATION: 99 % | DIASTOLIC BLOOD PRESSURE: 84 MMHG | SYSTOLIC BLOOD PRESSURE: 132 MMHG | TEMPERATURE: 97 F | BODY MASS INDEX: 43.4 KG/M2 | HEART RATE: 84 BPM

## 2023-02-27 DIAGNOSIS — I10 HYPERTENSION, UNSPECIFIED TYPE: ICD-10-CM

## 2023-02-27 DIAGNOSIS — E11.9 TYPE 2 DIABETES MELLITUS WITHOUT COMPLICATION, WITHOUT LONG-TERM CURRENT USE OF INSULIN: Primary | ICD-10-CM

## 2023-02-27 DIAGNOSIS — I10 PRIMARY HYPERTENSION: ICD-10-CM

## 2023-02-27 DIAGNOSIS — K21.9 GASTROESOPHAGEAL REFLUX DISEASE WITHOUT ESOPHAGITIS: ICD-10-CM

## 2023-02-27 DIAGNOSIS — E78.5 HYPERLIPIDEMIA, UNSPECIFIED HYPERLIPIDEMIA TYPE: ICD-10-CM

## 2023-02-27 DIAGNOSIS — M05.79 RHEUMATOID ARTHRITIS INVOLVING MULTIPLE SITES WITH POSITIVE RHEUMATOID FACTOR: ICD-10-CM

## 2023-02-27 DIAGNOSIS — M19.90 ARTHRITIS: ICD-10-CM

## 2023-02-27 LAB
ALBUMIN SERPL BCP-MCNC: 3.4 G/DL (ref 3.5–5)
ALBUMIN/GLOB SERPL: 0.8 {RATIO}
ALP SERPL-CCNC: 125 U/L (ref 41–108)
ALT SERPL W P-5'-P-CCNC: 36 U/L (ref 13–56)
ANION GAP SERPL CALCULATED.3IONS-SCNC: 7 MMOL/L (ref 7–16)
AST SERPL W P-5'-P-CCNC: 28 U/L (ref 15–37)
BASOPHILS # BLD AUTO: 0.06 K/UL (ref 0–0.2)
BASOPHILS NFR BLD AUTO: 1.1 % (ref 0–1)
BILIRUB SERPL-MCNC: 0.6 MG/DL (ref ?–1.2)
BUN SERPL-MCNC: 8 MG/DL (ref 7–18)
BUN/CREAT SERPL: 8 (ref 6–20)
CALCIUM SERPL-MCNC: 8.9 MG/DL (ref 8.5–10.1)
CHLORIDE SERPL-SCNC: 105 MMOL/L (ref 98–107)
CO2 SERPL-SCNC: 30 MMOL/L (ref 21–32)
CREAT SERPL-MCNC: 1 MG/DL (ref 0.55–1.02)
DIFFERENTIAL METHOD BLD: ABNORMAL
EGFR (NO RACE VARIABLE) (RUSH/TITUS): 68 ML/MIN/1.73M²
EOSINOPHIL # BLD AUTO: 0.34 K/UL (ref 0–0.5)
EOSINOPHIL NFR BLD AUTO: 6 % (ref 1–4)
ERYTHROCYTE [DISTWIDTH] IN BLOOD BY AUTOMATED COUNT: 16.1 % (ref 11.5–14.5)
EST. AVERAGE GLUCOSE BLD GHB EST-MCNC: 147 MG/DL
GLOBULIN SER-MCNC: 4.4 G/DL (ref 2–4)
GLUCOSE SERPL-MCNC: 89 MG/DL (ref 74–106)
HBA1C MFR BLD HPLC: 7 % (ref 4.5–6.6)
HCT VFR BLD AUTO: 34.1 % (ref 38–47)
HGB BLD-MCNC: 11 G/DL (ref 12–16)
IMM GRANULOCYTES # BLD AUTO: 0.01 K/UL (ref 0–0.04)
IMM GRANULOCYTES NFR BLD: 0.2 % (ref 0–0.4)
LYMPHOCYTES # BLD AUTO: 1.72 K/UL (ref 1–4.8)
LYMPHOCYTES NFR BLD AUTO: 30.5 % (ref 27–41)
MCH RBC QN AUTO: 29.2 PG (ref 27–31)
MCHC RBC AUTO-ENTMCNC: 32.3 G/DL (ref 32–36)
MCV RBC AUTO: 90.5 FL (ref 80–96)
MONOCYTES # BLD AUTO: 0.46 K/UL (ref 0–0.8)
MONOCYTES NFR BLD AUTO: 8.2 % (ref 2–6)
MPC BLD CALC-MCNC: 9.9 FL (ref 9.4–12.4)
NEUTROPHILS # BLD AUTO: 3.05 K/UL (ref 1.8–7.7)
NEUTROPHILS NFR BLD AUTO: 54 % (ref 53–65)
NRBC # BLD AUTO: 0 X10E3/UL
NRBC, AUTO (.00): 0 %
PLATELET # BLD AUTO: 349 K/UL (ref 150–400)
POTASSIUM SERPL-SCNC: 3.4 MMOL/L (ref 3.5–5.1)
PROT SERPL-MCNC: 7.8 G/DL (ref 6.4–8.2)
RBC # BLD AUTO: 3.77 M/UL (ref 4.2–5.4)
SODIUM SERPL-SCNC: 139 MMOL/L (ref 136–145)
WBC # BLD AUTO: 5.64 K/UL (ref 4.5–11)

## 2023-02-27 PROCEDURE — 3008F PR BODY MASS INDEX (BMI) DOCUMENTED: ICD-10-PCS | Mod: ,,, | Performed by: FAMILY MEDICINE

## 2023-02-27 PROCEDURE — 3075F SYST BP GE 130 - 139MM HG: CPT | Mod: ,,, | Performed by: FAMILY MEDICINE

## 2023-02-27 PROCEDURE — 85025 COMPLETE CBC W/AUTO DIFF WBC: CPT | Mod: ,,, | Performed by: CLINICAL MEDICAL LABORATORY

## 2023-02-27 PROCEDURE — 1160F RVW MEDS BY RX/DR IN RCRD: CPT | Mod: ,,, | Performed by: FAMILY MEDICINE

## 2023-02-27 PROCEDURE — 3051F PR MOST RECENT HEMOGLOBIN A1C LEVEL 7.0 - < 8.0%: ICD-10-PCS | Mod: ,,, | Performed by: FAMILY MEDICINE

## 2023-02-27 PROCEDURE — 99214 OFFICE O/P EST MOD 30 MIN: CPT | Mod: ,,, | Performed by: FAMILY MEDICINE

## 2023-02-27 PROCEDURE — 85025 CBC WITH DIFFERENTIAL: ICD-10-PCS | Mod: ,,, | Performed by: CLINICAL MEDICAL LABORATORY

## 2023-02-27 PROCEDURE — 83036 HEMOGLOBIN A1C: ICD-10-PCS | Mod: ,,, | Performed by: CLINICAL MEDICAL LABORATORY

## 2023-02-27 PROCEDURE — 1159F PR MEDICATION LIST DOCUMENTED IN MEDICAL RECORD: ICD-10-PCS | Mod: ,,, | Performed by: FAMILY MEDICINE

## 2023-02-27 PROCEDURE — 3008F BODY MASS INDEX DOCD: CPT | Mod: ,,, | Performed by: FAMILY MEDICINE

## 2023-02-27 PROCEDURE — 1160F PR REVIEW ALL MEDS BY PRESCRIBER/CLIN PHARMACIST DOCUMENTED: ICD-10-PCS | Mod: ,,, | Performed by: FAMILY MEDICINE

## 2023-02-27 PROCEDURE — 3075F PR MOST RECENT SYSTOLIC BLOOD PRESS GE 130-139MM HG: ICD-10-PCS | Mod: ,,, | Performed by: FAMILY MEDICINE

## 2023-02-27 PROCEDURE — 3079F PR MOST RECENT DIASTOLIC BLOOD PRESSURE 80-89 MM HG: ICD-10-PCS | Mod: ,,, | Performed by: FAMILY MEDICINE

## 2023-02-27 PROCEDURE — 1159F MED LIST DOCD IN RCRD: CPT | Mod: ,,, | Performed by: FAMILY MEDICINE

## 2023-02-27 PROCEDURE — 3079F DIAST BP 80-89 MM HG: CPT | Mod: ,,, | Performed by: FAMILY MEDICINE

## 2023-02-27 PROCEDURE — 80053 COMPREHEN METABOLIC PANEL: CPT | Mod: ,,, | Performed by: CLINICAL MEDICAL LABORATORY

## 2023-02-27 PROCEDURE — 83036 HEMOGLOBIN GLYCOSYLATED A1C: CPT | Mod: ,,, | Performed by: CLINICAL MEDICAL LABORATORY

## 2023-02-27 PROCEDURE — 80053 COMPREHENSIVE METABOLIC PANEL: ICD-10-PCS | Mod: ,,, | Performed by: CLINICAL MEDICAL LABORATORY

## 2023-02-27 PROCEDURE — 3051F HG A1C>EQUAL 7.0%<8.0%: CPT | Mod: ,,, | Performed by: FAMILY MEDICINE

## 2023-02-27 PROCEDURE — 99214 PR OFFICE/OUTPT VISIT, EST, LEVL IV, 30-39 MIN: ICD-10-PCS | Mod: ,,, | Performed by: FAMILY MEDICINE

## 2023-02-27 RX ORDER — FOLIC ACID 1 MG/1
1 TABLET ORAL DAILY
Qty: 90 TABLET | Refills: 3 | Status: SHIPPED | OUTPATIENT
Start: 2023-02-27

## 2023-02-27 RX ORDER — ATORVASTATIN CALCIUM 10 MG/1
10 TABLET, FILM COATED ORAL DAILY
Qty: 90 TABLET | Refills: 3 | Status: SHIPPED | OUTPATIENT
Start: 2023-02-27

## 2023-02-27 RX ORDER — GLIPIZIDE 10 MG/1
10 TABLET ORAL
Qty: 90 TABLET | Refills: 3 | Status: SHIPPED | OUTPATIENT
Start: 2023-02-27 | End: 2023-06-14

## 2023-02-27 RX ORDER — METFORMIN HYDROCHLORIDE 500 MG/1
500 TABLET ORAL 2 TIMES DAILY WITH MEALS
Qty: 180 TABLET | Refills: 3 | Status: SHIPPED | OUTPATIENT
Start: 2023-02-27

## 2023-02-27 RX ORDER — TRAMADOL HYDROCHLORIDE 50 MG/1
TABLET ORAL
Qty: 60 TABLET | Refills: 2 | Status: SHIPPED | OUTPATIENT
Start: 2023-02-27 | End: 2023-04-03

## 2023-02-27 RX ORDER — POTASSIUM CHLORIDE 20 MEQ/1
20 TABLET, EXTENDED RELEASE ORAL 2 TIMES DAILY
Qty: 180 TABLET | Refills: 3 | Status: SHIPPED | OUTPATIENT
Start: 2023-02-27

## 2023-02-27 RX ORDER — HYDROCHLOROTHIAZIDE 25 MG/1
25 TABLET ORAL EVERY MORNING
Qty: 90 TABLET | Refills: 3 | Status: SHIPPED | OUTPATIENT
Start: 2023-02-27

## 2023-02-27 RX ORDER — GLIMEPIRIDE 4 MG/1
4 TABLET ORAL
Qty: 90 TABLET | Refills: 3 | Status: SHIPPED | OUTPATIENT
Start: 2023-02-27

## 2023-02-27 RX ORDER — ESOMEPRAZOLE MAGNESIUM 40 MG/1
40 CAPSULE, DELAYED RELEASE ORAL
Qty: 90 CAPSULE | Refills: 3 | Status: SHIPPED | OUTPATIENT
Start: 2023-02-27 | End: 2024-03-13 | Stop reason: SDUPTHER

## 2023-02-27 NOTE — PROGRESS NOTES
Subjective:       Patient ID: Natali Claire is a 52 y.o. female.    Chief Complaint: Hypertension, Diabetes, and Hyperlipidemia    Pt. Is doing well. Voices no new complaints.    Hypertension  Pertinent negatives include no chest pain, headaches or shortness of breath.   Diabetes  Pertinent negatives for hypoglycemia include no confusion or headaches. Pertinent negatives for diabetes include no chest pain and no fatigue.   Hyperlipidemia  Pertinent negatives include no chest pain or shortness of breath.   Review of Systems   Constitutional:  Negative for fatigue and fever.   HENT:  Negative for nasal congestion and dental problem.    Eyes:  Negative for discharge.   Respiratory:  Negative for cough and shortness of breath.    Cardiovascular:  Negative for chest pain.   Gastrointestinal:  Negative for constipation, diarrhea, nausea and vomiting.   Genitourinary:  Negative for bladder incontinence, difficulty urinating and hot flashes.   Musculoskeletal:  Positive for arthralgias.   Allergic/Immunologic: Negative for environmental allergies.   Neurological:  Negative for headaches.   Psychiatric/Behavioral:  Negative for behavioral problems and confusion.        Objective:      Physical Exam  Vitals and nursing note reviewed.   Constitutional:       Appearance: Normal appearance. She is obese.   HENT:      Head: Normocephalic and atraumatic.      Right Ear: Tympanic membrane normal.      Left Ear: Tympanic membrane normal.      Nose: Nose normal.      Mouth/Throat:      Mouth: Mucous membranes are moist.   Eyes:      Extraocular Movements: Extraocular movements intact.      Conjunctiva/sclera: Conjunctivae normal.      Pupils: Pupils are equal, round, and reactive to light.   Cardiovascular:      Rate and Rhythm: Normal rate and regular rhythm.      Pulses: Normal pulses.   Pulmonary:      Effort: Pulmonary effort is normal.      Breath sounds: Normal breath sounds.   Abdominal:      General: Abdomen is flat. Bowel  sounds are normal.      Palpations: Abdomen is soft.   Musculoskeletal:         General: Normal range of motion.      Cervical back: Normal range of motion and neck supple.      Comments: Multiple site arthritis   Skin:     General: Skin is warm and dry.   Neurological:      General: No focal deficit present.      Mental Status: She is alert and oriented to person, place, and time.   Psychiatric:         Mood and Affect: Mood normal.       Assessment:       Problem List Items Addressed This Visit          Cardiac/Vascular    Hypertension    Relevant Medications    hydroCHLOROthiazide (HYDRODIURIL) 25 MG tablet    potassium chloride SA (K-DUR,KLOR-CON) 20 MEQ tablet    Hyperlipidemia    Relevant Medications    atorvastatin (LIPITOR) 10 MG tablet       Endocrine    Type 2 diabetes mellitus without complication, without long-term current use of insulin - Primary    Relevant Medications    glimepiride (AMARYL) 4 MG tablet    glipiZIDE (GLUCOTROL) 10 MG tablet    metFORMIN (GLUCOPHAGE) 500 MG tablet    Other Relevant Orders    CBC Auto Differential    Comprehensive Metabolic Panel    Hemoglobin A1C       GI    Gastroesophageal reflux disease without esophagitis    Relevant Medications    esomeprazole (NEXIUM) 40 MG capsule       Orthopedic    Arthritis    Relevant Medications    folic acid (FOLVITE) 1 MG tablet    traMADoL (ULTRAM) 50 mg tablet         Plan:       Will call lab results  RTC 3 months

## 2023-03-01 ENCOUNTER — TELEPHONE (OUTPATIENT)
Dept: PRIMARY CARE CLINIC | Facility: CLINIC | Age: 53
End: 2023-03-01
Payer: COMMERCIAL

## 2023-03-01 DIAGNOSIS — D64.9 ANEMIA, UNSPECIFIED TYPE: Primary | ICD-10-CM

## 2023-03-01 RX ORDER — FERROUS SULFATE 325(65) MG
325 TABLET, DELAYED RELEASE (ENTERIC COATED) ORAL DAILY
Qty: 90 TABLET | Refills: 3 | Status: SHIPPED | OUTPATIENT
Start: 2023-03-01

## 2023-03-01 RX ORDER — FERROUS SULFATE 325(65) MG
325 TABLET, DELAYED RELEASE (ENTERIC COATED) ORAL DAILY
COMMUNITY
End: 2023-03-01 | Stop reason: SDUPTHER

## 2023-03-01 NOTE — TELEPHONE ENCOUNTER
----- Message from Herlinda Louis MD sent at 2/28/2023  8:20 AM CST -----  Please give pt. Results - needs iron replacement

## 2023-03-02 ENCOUNTER — PATIENT MESSAGE (OUTPATIENT)
Dept: OBSTETRICS AND GYNECOLOGY | Facility: CLINIC | Age: 53
End: 2023-03-02
Payer: COMMERCIAL

## 2023-03-07 DIAGNOSIS — Z12.11 COLON CANCER SCREENING: Primary | ICD-10-CM

## 2023-03-13 ENCOUNTER — TELEPHONE (OUTPATIENT)
Dept: PAIN MEDICINE | Facility: CLINIC | Age: 53
End: 2023-03-13
Payer: COMMERCIAL

## 2023-03-13 NOTE — TELEPHONE ENCOUNTER
----- Message from Viviana Campuzano sent at 3/13/2023  1:38 PM CDT -----  Regarding: PLEASE CALL BACK  PATIENT NEEDS AN OKAY SENT OVER TO  PHYSICAL THERAPY STATING THAT IT IS OKAY FOR HER TO STILL BE SEEN BECAUSE IT IS HELPING HER. PT'S # [phone]

## 2023-03-13 NOTE — TELEPHONE ENCOUNTER
Left voice message for patient. Per Dr. Claire patient to have therapy send a request for continuation of physical therapy to the clinic.

## 2023-03-13 NOTE — TELEPHONE ENCOUNTER
----- Message from Viviana Campuzano sent at 3/13/2023  1:38 PM CDT -----  Regarding: PLEASE CALL BACK  PATIENT NEEDS AN OKAY SENT OVER TO  PHYSICAL THERAPY STATING THAT IT IS OKAY FOR HER TO STILL BE SEEN BECAUSE IT IS HELPING HER. PT'S # [phone]

## 2023-03-13 NOTE — TELEPHONE ENCOUNTER
Patient returned call and said she got the message and is going to PT now and will tell them to send request to continue therapy.

## 2023-03-29 ENCOUNTER — TELEPHONE (OUTPATIENT)
Dept: OBSTETRICS AND GYNECOLOGY | Facility: CLINIC | Age: 53
End: 2023-03-29
Payer: COMMERCIAL

## 2023-03-29 NOTE — TELEPHONE ENCOUNTER
Patient states she is getting a bill for her mammogram. States it was billed wrong. It looks like diagnosis is routine screening like always. Not sure what to do with this. She states the coding needs to be changed per [facility] billing.

## 2023-03-29 NOTE — TELEPHONE ENCOUNTER
----- Message from Jaelyn Ignacio sent at 3/29/2023  4:12 PM CDT -----  Who Called: Patient     What is the request in detail: Requesting call back to discuss 01/19 visit pt was charged $97.04 for annual visit. Please review coding and call update pt with information. Pls advise.    Can the clinic reply by [facility]? yes    Best Call Back Number: [phone]      Additional Information:

## 2023-04-03 ENCOUNTER — OFFICE VISIT (OUTPATIENT)
Dept: PAIN MEDICINE | Facility: CLINIC | Age: 53
End: 2023-04-03
Payer: MEDICARE

## 2023-04-03 VITALS
DIASTOLIC BLOOD PRESSURE: 66 MMHG | WEIGHT: 293 LBS | HEART RATE: 58 BPM | SYSTOLIC BLOOD PRESSURE: 159 MMHG | BODY MASS INDEX: 43.4 KG/M2 | RESPIRATION RATE: 18 BRPM | HEIGHT: 69 IN

## 2023-04-03 DIAGNOSIS — M06.09 RHEUMATOID ARTHRITIS OF MULTIPLE SITES WITHOUT RHEUMATOID FACTOR: Chronic | ICD-10-CM

## 2023-04-03 DIAGNOSIS — M54.12 CERVICAL RADICULOPATHY: Primary | Chronic | ICD-10-CM

## 2023-04-03 DIAGNOSIS — Z79.899 ENCOUNTER FOR LONG-TERM (CURRENT) USE OF OTHER MEDICATIONS: ICD-10-CM

## 2023-04-03 PROCEDURE — 80305 DRUG TEST PRSMV DIR OPT OBS: CPT | Mod: PBBFAC | Performed by: PHYSICIAN ASSISTANT

## 2023-04-03 PROCEDURE — 1159F PR MEDICATION LIST DOCUMENTED IN MEDICAL RECORD: ICD-10-PCS | Mod: CPTII,,, | Performed by: PHYSICIAN ASSISTANT

## 2023-04-03 PROCEDURE — 99214 PR OFFICE/OUTPT VISIT, EST, LEVL IV, 30-39 MIN: ICD-10-PCS | Mod: S$PBB,,, | Performed by: PHYSICIAN ASSISTANT

## 2023-04-03 PROCEDURE — 3078F DIAST BP <80 MM HG: CPT | Mod: CPTII,,, | Performed by: PHYSICIAN ASSISTANT

## 2023-04-03 PROCEDURE — 99214 OFFICE O/P EST MOD 30 MIN: CPT | Mod: S$PBB,,, | Performed by: PHYSICIAN ASSISTANT

## 2023-04-03 PROCEDURE — 3077F SYST BP >= 140 MM HG: CPT | Mod: CPTII,,, | Performed by: PHYSICIAN ASSISTANT

## 2023-04-03 PROCEDURE — 3077F PR MOST RECENT SYSTOLIC BLOOD PRESSURE >= 140 MM HG: ICD-10-PCS | Mod: CPTII,,, | Performed by: PHYSICIAN ASSISTANT

## 2023-04-03 PROCEDURE — 3078F PR MOST RECENT DIASTOLIC BLOOD PRESSURE < 80 MM HG: ICD-10-PCS | Mod: CPTII,,, | Performed by: PHYSICIAN ASSISTANT

## 2023-04-03 PROCEDURE — 3051F PR MOST RECENT HEMOGLOBIN A1C LEVEL 7.0 - < 8.0%: ICD-10-PCS | Mod: CPTII,,, | Performed by: PHYSICIAN ASSISTANT

## 2023-04-03 PROCEDURE — 3008F PR BODY MASS INDEX (BMI) DOCUMENTED: ICD-10-PCS | Mod: CPTII,,, | Performed by: PHYSICIAN ASSISTANT

## 2023-04-03 PROCEDURE — 1159F MED LIST DOCD IN RCRD: CPT | Mod: CPTII,,, | Performed by: PHYSICIAN ASSISTANT

## 2023-04-03 PROCEDURE — 99215 OFFICE O/P EST HI 40 MIN: CPT | Mod: PBBFAC | Performed by: PHYSICIAN ASSISTANT

## 2023-04-03 PROCEDURE — 3051F HG A1C>EQUAL 7.0%<8.0%: CPT | Mod: CPTII,,, | Performed by: PHYSICIAN ASSISTANT

## 2023-04-03 PROCEDURE — 3008F BODY MASS INDEX DOCD: CPT | Mod: CPTII,,, | Performed by: PHYSICIAN ASSISTANT

## 2023-04-03 RX ORDER — HYDROCODONE BITARTRATE AND ACETAMINOPHEN 10; 325 MG/1; MG/1
1 TABLET ORAL EVERY 12 HOURS PRN
Qty: 60 TABLET | Refills: 0 | Status: SHIPPED | OUTPATIENT
Start: 2023-05-06 | End: 2023-05-30 | Stop reason: SDUPTHER

## 2023-04-03 RX ORDER — HYDROCODONE BITARTRATE AND ACETAMINOPHEN 10; 325 MG/1; MG/1
1 TABLET ORAL EVERY 12 HOURS PRN
Qty: 60 TABLET | Refills: 0 | Status: SHIPPED | OUTPATIENT
Start: 2023-04-07 | End: 2023-05-30 | Stop reason: SDUPTHER

## 2023-04-03 NOTE — PROGRESS NOTES
Subjective:         Patient ID: Natali Claire is a 52 y.o. female.    Chief Complaint: Shoulder Pain and Low-back Pain        Pain  This is a chronic problem. The current episode started more than 1 year ago. The problem occurs daily. The problem has been unchanged. Associated symptoms include arthralgias and neck pain. Pertinent negatives include no anorexia, chest pain, chills, coughing, diaphoresis, fever, sore throat, vertigo or vomiting.   Review of Systems   Constitutional:  Negative for activity change, appetite change, chills, diaphoresis, fever and unexpected weight change.   HENT:  Negative for drooling, ear discharge, ear pain, facial swelling, nosebleeds, sore throat, trouble swallowing, voice change and goiter.    Eyes:  Negative for photophobia, pain, discharge, redness and visual disturbance.   Respiratory:  Negative for apnea, cough, choking, chest tightness, shortness of breath, wheezing and stridor.    Cardiovascular:  Negative for chest pain, palpitations and leg swelling.   Gastrointestinal:  Negative for abdominal distention, anorexia, diarrhea, rectal pain, vomiting and fecal incontinence.   Endocrine: Negative for cold intolerance, heat intolerance, polydipsia, polyphagia and polyuria.   Genitourinary:  Negative for bladder incontinence, dysuria, flank pain, frequency and hot flashes.   Musculoskeletal:  Positive for arthralgias, back pain, leg pain and neck pain.   Integumentary:  Negative for color change and pallor.   Allergic/Immunologic: Negative for immunocompromised state.   Neurological:  Negative for dizziness, vertigo, seizures, syncope, facial asymmetry, speech difficulty, light-headedness, coordination difficulties, memory loss and coordination difficulties.   Hematological:  Negative for adenopathy. Does not bruise/bleed easily.   Psychiatric/Behavioral:  Negative for agitation, behavioral problems, confusion, decreased concentration, dysphoric mood, hallucinations, self-injury  "and suicidal ideas. The patient is not nervous/anxious and is not hyperactive.          Past Medical History:   Diagnosis Date    Anxiety     Arthritis     Chronic low back pain     Chronic pain syndrome     Degenerative joint disease involving multiple joints     Diabetes mellitus, type 2     GERD (gastroesophageal reflux disease)     Hyperlipidemia     Hypertension     Lumbar radiculopathy     Lumbosacral spondylosis     Multiple joint pain     Onychomycosis     Osteoarthritis     Other long term (current) drug therapy     Rheumatoid arthritis      Past Surgical History:   Procedure Laterality Date    BREAST SURGERY      CHOLECYSTECTOMY      TUBAL LIGATION       Social History     Socioeconomic History    Marital status: Single    Number of children: 2   Occupational History    Occupation: disable   Tobacco Use    Smoking status: Never     Passive exposure: Never    Smokeless tobacco: Never   Substance and Sexual Activity    Alcohol use: Not Currently    Drug use: Never    Sexual activity: Yes     Partners: Male     Family History   Problem Relation Age of Onset    Diabetes Mother     Heart disease Father     Diabetes Sister     Breast cancer Paternal Cousin      Review of patient's allergies indicates:  No Known Allergies     Objective:  Vitals:    04/03/23 1304   BP: (!) 159/66   Pulse: (!) 58   Resp: 18   Weight: 135.6 kg (299 lb)   Height: 5' 9" (1.753 m)   PainSc:   3           Physical Exam  Vitals and nursing note reviewed. Exam conducted with a chaperone present.   Constitutional:       General: She is awake. She is not in acute distress.     Appearance: Normal appearance. She is not ill-appearing, toxic-appearing or diaphoretic.   HENT:      Head: Normocephalic and atraumatic.      Nose: Nose normal.      Mouth/Throat:      Mouth: Mucous membranes are moist.      Pharynx: Oropharynx is clear.   Eyes:      Conjunctiva/sclera: Conjunctivae normal.      Pupils: Pupils are equal, round, and reactive to light. "   Cardiovascular:      Rate and Rhythm: Normal rate.   Pulmonary:      Effort: Pulmonary effort is normal. No respiratory distress.   Abdominal:      Palpations: Abdomen is soft.   Musculoskeletal:         General: Normal range of motion.      Cervical back: Normal range of motion and neck supple. Tenderness present.      Thoracic back: Tenderness present.      Lumbar back: Tenderness present.   Skin:     General: Skin is warm and dry.   Neurological:      General: No focal deficit present.      Mental Status: She is alert and oriented to person, place, and time. Mental status is at baseline.      Cranial Nerves: No cranial nerve deficit (II-XII).   Psychiatric:         Mood and Affect: Mood normal.         Behavior: Behavior normal. Behavior is cooperative.         Thought Content: Thought content normal.         Mammo Digital Screening Bilat  Narrative: Result:   Mammo Digital Screening Bilat     History:  Patient is 52 y.o. and is seen for a screening mammogram.    Films Compared:  Compared to: 10/26/2021 Mammo Digital Diagnostic Left and 10/05/2021 Mammo   Digital Screening Bilat     Findings:     The breasts have scattered areas of fibroglandular density. There is no   evidence of suspicious masses, calcifications, or other abnormal findings.  Impression: Bilateral  There is no mammographic evidence of malignancy.    BI-RADS Category:   Overall: 2 - Benign       Recommendation:  Routine screening mammogram in 1 year is recommended.    Your estimated lifetime risk of breast cancer (to age 85) based on   Tyrer-Cuzick risk assessment model is Tyrer-Cuzick: 9.57 %. According to   the [employer] Society, patients with a lifetime breast cancer risk   of 20% or higher might benefit from supplemental screening tests.         Office Visit on 02/27/2023   Component Date Value Ref Range Status    Sodium 02/27/2023 139  136 - 145 mmol/L Final    Potassium 02/27/2023 3.4 (L)  3.5 - 5.1 mmol/L Final    Chloride  02/27/2023 105  98 - 107 mmol/L Final    CO2 02/27/2023 30  21 - 32 mmol/L Final    Anion Gap 02/27/2023 7  7 - 16 mmol/L Final    Glucose 02/27/2023 89  74 - 106 mg/dL Final    BUN 02/27/2023 8  7 - 18 mg/dL Final    Creatinine 02/27/2023 1.00  0.55 - 1.02 mg/dL Final    BUN/Creatinine Ratio 02/27/2023 8  6 - 20 Final    Calcium 02/27/2023 8.9  8.5 - 10.1 mg/dL Final    Total Protein 02/27/2023 7.8  6.4 - 8.2 g/dL Final    Albumin 02/27/2023 3.4 (L)  3.5 - 5.0 g/dL Final    Globulin 02/27/2023 4.4 (H)  2.0 - 4.0 g/dL Final    A/G Ratio 02/27/2023 0.8   Final    Bilirubin, Total 02/27/2023 0.6  >0.0 - 1.2 mg/dL Final    Alk Phos 02/27/2023 125 (H)  41 - 108 U/L Final    ALT 02/27/2023 36  13 - 56 U/L Final    AST 02/27/2023 28  15 - 37 U/L Final    eGFR 02/27/2023 68  >=60 mL/min/1.73m² Final    Hemoglobin A1C 02/27/2023 7.0 (H)  4.5 - 6.6 % Final    Estimated Average Glucose 02/27/2023 147  mg/dL Final    WBC 02/27/2023 5.64  4.50 - 11.00 K/uL Final    RBC 02/27/2023 3.77 (L)  4.20 - 5.40 M/uL Final    Hemoglobin 02/27/2023 11.0 (L)  12.0 - 16.0 g/dL Final    Hematocrit 02/27/2023 34.1 (L)  38.0 - 47.0 % Final    MCV 02/27/2023 90.5  80.0 - 96.0 fL Final    MCH 02/27/2023 29.2  27.0 - 31.0 pg Final    MCHC 02/27/2023 32.3  32.0 - 36.0 g/dL Final    RDW 02/27/2023 16.1 (H)  11.5 - 14.5 % Final    Platelet Count 02/27/2023 349  150 - 400 K/uL Final    MPV 02/27/2023 9.9  9.4 - 12.4 fL Final    Neutrophils % 02/27/2023 54.0  53.0 - 65.0 % Final    Lymphocytes % 02/27/2023 30.5  27.0 - 41.0 % Final    Monocytes % 02/27/2023 8.2 (H)  2.0 - 6.0 % Final    Eosinophils % 02/27/2023 6.0 (H)  1.0 - 4.0 % Final    Basophils % 02/27/2023 1.1 (H)  0.0 - 1.0 % Final    Immature Granulocytes % 02/27/2023 0.2  0.0 - 0.4 % Final    nRBC, Auto 02/27/2023 0.0  <=0.0 % Final    Neutrophils, Abs 02/27/2023 3.05  1.80 - 7.70 K/uL Final    Lymphocytes, Absolute 02/27/2023 1.72  1.00 - 4.80 K/uL Final    Monocytes, Absolute 02/27/2023  0.46  0.00 - 0.80 K/uL Final    Eosinophils, Absolute 02/27/2023 0.34  0.00 - 0.50 K/uL Final    Basophils, Absolute 02/27/2023 0.06  0.00 - 0.20 K/uL Final    Immature Granulocytes, Absolute 02/27/2023 0.01  0.00 - 0.04 K/uL Final    nRBC, Absolute 02/27/2023 0.00  <=0.00 x10e3/uL Final    Diff Type 02/27/2023 Auto   Final   Patient Outreach on 02/20/2023   Component Date Value Ref Range Status    PAP Recommendation External 12/19/2017 Pap in 3 years   Final   Office Visit on 12/06/2022   Component Date Value Ref Range Status    POC Amphetamines 12/06/2022 Negative  Negative, Inconclusive Final    POC Barbiturates 12/06/2022 Negative  Negative, Inconclusive Final    POC Benzodiazepines 12/06/2022 Negative  Negative, Inconclusive Final    POC Cocaine 12/06/2022 Negative  Negative, Inconclusive Final    POC THC 12/06/2022 Negative  Negative, Inconclusive Final    POC Methadone 12/06/2022 Negative  Negative, Inconclusive Final    POC Methamphetamine 12/06/2022 Negative  Negative, Inconclusive Final    POC Opiates 12/06/2022 Presumptive Positive (A)  Negative, Inconclusive Final    POC Oxycodone 12/06/2022 Negative  Negative, Inconclusive Final    POC Phencyclidine 12/06/2022 Negative  Negative, Inconclusive Final    POC Methylenedioxymethamphetamine * 12/06/2022 Negative  Negative, Inconclusive Final    POC Tricyclic Antidepressants 12/06/2022 Negative  Negative, Inconclusive Final    POC Buprenorphine 12/06/2022 Negative   Final     Acceptable 12/06/2022 Yes   Final    POC Temperature (Urine) 12/06/2022 90   Final   Office Visit on 10/17/2022   Component Date Value Ref Range Status    Sodium 10/17/2022 139  136 - 145 mmol/L Final    Potassium 10/17/2022 3.6  3.5 - 5.1 mmol/L Final    Chloride 10/17/2022 103  98 - 107 mmol/L Final    CO2 10/17/2022 32  21 - 32 mmol/L Final    Anion Gap 10/17/2022 8  7 - 16 mmol/L Final    Glucose 10/17/2022 137 (H)  74 - 106 mg/dL Final    BUN 10/17/2022 10  7 - 18  mg/dL Final    Creatinine 10/17/2022 0.93  0.55 - 1.02 mg/dL Final    BUN/Creatinine Ratio 10/17/2022 11  6 - 20 Final    Calcium 10/17/2022 9.0  8.5 - 10.1 mg/dL Final    Total Protein 10/17/2022 8.0  6.4 - 8.2 g/dL Final    Albumin 10/17/2022 3.2 (L)  3.5 - 5.0 g/dL Final    Globulin 10/17/2022 4.8 (H)  2.0 - 4.0 g/dL Final    A/G Ratio 10/17/2022 0.7   Final    Bilirubin, Total 10/17/2022 0.4  >0.0 - 1.2 mg/dL Final    Alk Phos 10/17/2022 147 (H)  41 - 108 U/L Final    ALT 10/17/2022 33  13 - 56 U/L Final    AST 10/17/2022 24  15 - 37 U/L Final    eGFR 10/17/2022 74  >=60 mL/min/1.73m² Final    Triglycerides 10/17/2022 189 (H)  35 - 150 mg/dL Final    Cholesterol 10/17/2022 151  0 - 200 mg/dL Final    HDL Cholesterol 10/17/2022 33 (L)  40 - 60 mg/dL Final    Cholesterol/HDL Ratio (Risk Factor) 10/17/2022 4.6   Final    Non-HDL 10/17/2022 118  mg/dL Final    LDL Calculated 10/17/2022 80  mg/dL Final    LDL/HDL 10/17/2022 2.4   Final    VLDL 10/17/2022 38  mg/dL Final    Hemoglobin A1C 10/17/2022 8.3 (H)  4.5 - 6.6 % Final    Estimated Average Glucose 10/17/2022 190  mg/dL Final    WBC 10/17/2022 5.16  4.50 - 11.00 K/uL Final    RBC 10/17/2022 4.25  4.20 - 5.40 M/uL Final    Hemoglobin 10/17/2022 12.4  12.0 - 16.0 g/dL Final    Hematocrit 10/17/2022 37.5 (L)  38.0 - 47.0 % Final    MCV 10/17/2022 88.2  80.0 - 96.0 fL Final    MCH 10/17/2022 29.2  27.0 - 31.0 pg Final    MCHC 10/17/2022 33.1  32.0 - 36.0 g/dL Final    RDW 10/17/2022 14.7 (H)  11.5 - 14.5 % Final    Platelet Count 10/17/2022 317  150 - 400 K/uL Final    MPV 10/17/2022 10.5  9.4 - 12.4 fL Final    Neutrophils % 10/17/2022 53.0  53.0 - 65.0 % Final    Lymphocytes % 10/17/2022 35.1  27.0 - 41.0 % Final    Monocytes % 10/17/2022 6.0  2.0 - 6.0 % Final    Eosinophils % 10/17/2022 4.3 (H)  1.0 - 4.0 % Final    Basophils % 10/17/2022 1.4 (H)  0.0 - 1.0 % Final    Immature Granulocytes % 10/17/2022 0.2  0.0 - 0.4 % Final    nRBC, Auto 10/17/2022 0.0  <=0.0 %  Final    Neutrophils, Abs 10/17/2022 2.74  1.80 - 7.70 K/uL Final    Lymphocytes, Absolute 10/17/2022 1.81  1.00 - 4.80 K/uL Final    Monocytes, Absolute 10/17/2022 0.31  0.00 - 0.80 K/uL Final    Eosinophils, Absolute 10/17/2022 0.22  0.00 - 0.50 K/uL Final    Basophils, Absolute 10/17/2022 0.07  0.00 - 0.20 K/uL Final    Immature Granulocytes, Absolute 10/17/2022 0.01  0.00 - 0.04 K/uL Final    nRBC, Absolute 10/17/2022 0.00  <=0.00 x10e3/uL Final    Diff Type 10/17/2022 Auto   Final         Orders Placed This Encounter   Procedures    POCT Urine Drug Screen Presump     Interpretive Information:     Negative:  No drug detected at the cut off level.   Positive:  This result represents presumptive positive for the   tested drug, other substances may yield a positive response other   than the analyte of interest. This result should be utilized for   diagnostic purpose only. Confirmation testing will be performed upon physician request only.            Requested Prescriptions     Signed Prescriptions Disp Refills    HYDROcodone-acetaminophen (NORCO)  mg per tablet 60 tablet 0     Sig: Take 1 tablet by mouth every 12 (twelve) hours as needed for Pain.    HYDROcodone-acetaminophen (NORCO)  mg per tablet 60 tablet 0     Sig: Take 1 tablet by mouth every 12 (twelve) hours as needed for Pain.       Assessment:     1. Cervical radiculopathy    2. Encounter for long-term (current) use of other medications    3. Rheumatoid arthritis of multiple sites without rheumatoid factor           A's of Opioid Risk Assessment  Activity:Patient can perform ADL.   Analgesia:Patients pain is partially controlled by current medication. Patient has tried OTC medications such as Tylenol and Ibuprofen with out relief.   Adverse Effects: Patient denies constipation or sedation.  Aberrant Behavior:  reviewed with no aberrant drug seeking/taking behavior.  Overdose reversal drug naloxone discussed    Drug screen  reviewed      Plan:    Narcan February 2023    She  tramadol from primary care provider March 2, 2023  She verbalized understanding narcotics agreement     She verbalizes if this happens again she will no longer receive narcotics [facility]    She states she was unaware that tramadol is a narcotic     April 4, 2023 candidate destroyed tramadol 50 mg 7 tablets    Follows rheumatology rheumatoid arthritis ARM    She would like to continue with conservative management     Continue home exercise program as directed    Continue current medication    Follow-up 2 months    Dr. Claire, August 2023    Bring original prescription medication bottles/container/box with labels to each visit

## 2023-04-04 ENCOUNTER — TELEPHONE (OUTPATIENT)
Dept: PAIN MEDICINE | Facility: CLINIC | Age: 53
End: 2023-04-04
Payer: MEDICARE

## 2023-04-04 NOTE — TELEPHONE ENCOUNTER
IVÁN SILVA   04/04/2023   10:44 AM   Patient returned Tramadol HCL 50mg filled on 3/2/23. 7 pills counted and destroyed per JESSICA Silva and JESSICA Reddy.

## 2023-04-12 ENCOUNTER — PATIENT MESSAGE (OUTPATIENT)
Dept: OBSTETRICS AND GYNECOLOGY | Facility: CLINIC | Age: 53
End: 2023-04-12
Payer: COMMERCIAL

## 2023-05-17 ENCOUNTER — CLINICAL SUPPORT (OUTPATIENT)
Dept: PRIMARY CARE CLINIC | Facility: CLINIC | Age: 53
End: 2023-05-17
Payer: MEDICARE

## 2023-05-17 DIAGNOSIS — M19.90 ARTHRITIS: Primary | ICD-10-CM

## 2023-05-17 PROCEDURE — 96372 PR INJECTION,THERAP/PROPH/DIAG2ST, IM OR SUBCUT: ICD-10-PCS | Mod: ,,, | Performed by: FAMILY MEDICINE

## 2023-05-17 PROCEDURE — 96372 THER/PROPH/DIAG INJ SC/IM: CPT | Mod: ,,, | Performed by: FAMILY MEDICINE

## 2023-05-17 RX ORDER — KETOROLAC TROMETHAMINE 30 MG/ML
60 INJECTION, SOLUTION INTRAMUSCULAR; INTRAVENOUS
Status: COMPLETED | OUTPATIENT
Start: 2023-05-17 | End: 2023-05-17

## 2023-05-17 RX ADMIN — KETOROLAC TROMETHAMINE 60 MG: 30 INJECTION, SOLUTION INTRAMUSCULAR; INTRAVENOUS at 01:05

## 2023-05-22 ENCOUNTER — TELEPHONE (OUTPATIENT)
Dept: PAIN MEDICINE | Facility: CLINIC | Age: 53
End: 2023-05-22
Payer: MEDICARE

## 2023-05-22 NOTE — TELEPHONE ENCOUNTER
----- Message from Viviana Campuzano sent at 5/22/2023  2:03 PM CDT -----  PATIENT NEEDS A CALL BACK NEEDS TO TALK TO SOMEONE ABOUT MEDS SHE WAS TO TURN .[phone]  IVÁN CHERRY   05/22/2023   3:38 PM   Patient stated [facility] [place]   Spoke with [facility] patient returned 60 tabs of tramadol. This was entire prescription,

## 2023-05-24 ENCOUNTER — PATIENT OUTREACH (OUTPATIENT)
Dept: ADMINISTRATIVE | Facility: HOSPITAL | Age: 53
End: 2023-05-24

## 2023-05-24 NOTE — PROGRESS NOTES
Health Maintenance Due   Topic Date Due    Hepatitis C Screening  Never done    Diabetes Urine Screening  Never done    Foot Exam  Never done    HIV Screening  Never done    TETANUS VACCINE  Never done    Sign Pain Contract  Never done    Shingles Vaccine (1 of 2) Never done    Colorectal Cancer Screening  Never done    Pneumococcal Vaccines (Age 0-64) (2 - PPSV23 if available, else PCV20) 01/10/2018    Cervical Cancer Screening  12/19/2020    COVID-19 Vaccine (4 - Booster for Moderna series) 01/02/2023    Hemoglobin A1c  05/27/2023 05/24/2023   Phone Call made to patient. (Success)   Patient stated she has not had a colonoscopy or pap smear completed, no foot exam. She stated she would discuss with pcp at next appointment. She stated she had eye exam completed at [facility] a few months ago.  Care Everywhere updates requested and reviewed.  Media reports reviewed.  [facility] and [facility] reviewed.  [facility] reviewed.  [facility] abstracted  Requested Eye exam records from [facility]  Next appointment :No future PCP appointment scheduled

## 2023-05-30 NOTE — PROGRESS NOTES
Subjective:         Patient ID: Natali Claire is a 52 y.o. female.    Chief Complaint: Shoulder Pain and Low-back Pain        Pain  This is a chronic problem. The current episode started more than 1 year ago. The problem occurs daily. The problem has been waxing and waning. Associated symptoms include arthralgias and neck pain. Pertinent negatives include no anorexia, chest pain, chills, coughing, diaphoresis, fever, sore throat, vertigo or vomiting.   Review of Systems   Constitutional:  Negative for activity change, appetite change, chills, diaphoresis, fever and unexpected weight change.   HENT:  Negative for drooling, ear discharge, ear pain, facial swelling, nosebleeds, sore throat, trouble swallowing, voice change and goiter.    Eyes:  Negative for photophobia, pain, discharge, redness and visual disturbance.   Respiratory:  Negative for apnea, cough, choking, chest tightness, shortness of breath, wheezing and stridor.    Cardiovascular:  Negative for chest pain, palpitations and leg swelling.   Gastrointestinal:  Negative for abdominal distention, anorexia, diarrhea, rectal pain, vomiting and fecal incontinence.   Endocrine: Negative for cold intolerance, heat intolerance, polydipsia, polyphagia and polyuria.   Genitourinary:  Negative for bladder incontinence, dysuria, flank pain, frequency and hot flashes.   Musculoskeletal:  Positive for arthralgias, back pain, leg pain and neck pain.   Integumentary:  Negative for color change and pallor.   Allergic/Immunologic: Negative for immunocompromised state.   Neurological:  Negative for dizziness, vertigo, seizures, syncope, facial asymmetry, speech difficulty, light-headedness, coordination difficulties, memory loss and coordination difficulties.   Hematological:  Negative for adenopathy. Does not bruise/bleed easily.   Psychiatric/Behavioral:  Negative for agitation, behavioral problems, confusion, decreased concentration, dysphoric mood, hallucinations,  "self-injury and suicidal ideas. The patient is not nervous/anxious and is not hyperactive.          Past Medical History:   Diagnosis Date    Anxiety     Arthritis     Chronic low back pain     Chronic pain syndrome     Degenerative joint disease involving multiple joints     Diabetes mellitus, type 2     GERD (gastroesophageal reflux disease)     Hyperlipidemia     Hypertension     Lumbar radiculopathy     Lumbosacral spondylosis     Multiple joint pain     Onychomycosis     Osteoarthritis     Other long term (current) drug therapy     Rheumatoid arthritis      Past Surgical History:   Procedure Laterality Date    BREAST SURGERY      CHOLECYSTECTOMY      TUBAL LIGATION       Social History     Socioeconomic History    Marital status: Single    Number of children: 2   Occupational History    Occupation: disable   Tobacco Use    Smoking status: Never     Passive exposure: Never    Smokeless tobacco: Never   Substance and Sexual Activity    Alcohol use: Not Currently    Drug use: Never    Sexual activity: Yes     Partners: Male     Family History   Problem Relation Age of Onset    Diabetes Mother     Heart disease Father     Diabetes Sister     Breast cancer Paternal Cousin      Review of patient's allergies indicates:  No Known Allergies     Objective:  Vitals:    05/31/23 1307   BP: (!) 152/58   Pulse: 64   Resp: 18   Weight: 134.7 kg (297 lb)   Height: 5' 9" (1.753 m)   PainSc:   3           Physical Exam  Vitals and nursing note reviewed. Exam conducted with a chaperone present.   Constitutional:       General: She is awake. She is not in acute distress.     Appearance: Normal appearance. She is not ill-appearing, toxic-appearing or diaphoretic.   HENT:      Head: Normocephalic and atraumatic.      Nose: Nose normal.      Mouth/Throat:      Mouth: Mucous membranes are moist.      Pharynx: Oropharynx is clear.   Eyes:      Conjunctiva/sclera: Conjunctivae normal.      Pupils: Pupils are equal, round, and reactive " to light.   Cardiovascular:      Rate and Rhythm: Normal rate.   Pulmonary:      Effort: Pulmonary effort is normal. No respiratory distress.   Abdominal:      Palpations: Abdomen is soft.   Musculoskeletal:         General: Normal range of motion.      Cervical back: Normal range of motion and neck supple. Tenderness present.      Thoracic back: Tenderness present.      Lumbar back: Tenderness present.   Skin:     General: Skin is warm and dry.   Neurological:      General: No focal deficit present.      Mental Status: She is alert and oriented to person, place, and time. Mental status is at baseline.      Cranial Nerves: No cranial nerve deficit (II-XII).   Psychiatric:         Mood and Affect: Mood normal.         Behavior: Behavior normal. Behavior is cooperative.         Thought Content: Thought content normal.         Mammo Digital Screening Bilat  Narrative: Result:   Mammo Digital Screening Bilat     History:  Patient is 52 y.o. and is seen for a screening mammogram.    Films Compared:  Compared to: 10/26/2021 Mammo Digital Diagnostic Left and 10/05/2021 Mammo   Digital Screening Bilat     Findings:     The breasts have scattered areas of fibroglandular density. There is no   evidence of suspicious masses, calcifications, or other abnormal findings.  Impression: Bilateral  There is no mammographic evidence of malignancy.    BI-RADS Category:   Overall: 2 - Benign       Recommendation:  Routine screening mammogram in 1 year is recommended.    Your estimated lifetime risk of breast cancer (to age 85) based on   Tyrer-Cuzick risk assessment model is Tyrer-Cuzick: 9.57 %. According to   the [employer], patients with a lifetime breast cancer risk   of 20% or higher might benefit from supplemental screening tests.         Office Visit on 04/03/2023   Component Date Value Ref Range Status    POC Amphetamines 04/03/2023 Negative  Negative, Inconclusive Final    POC Barbiturates 04/03/2023 Negative   Negative, Inconclusive Final    POC Benzodiazepines 04/03/2023 Negative  Negative, Inconclusive Final    POC Cocaine 04/03/2023 Negative  Negative, Inconclusive Final    POC THC 04/03/2023 Negative  Negative, Inconclusive Final    POC Methadone 04/03/2023 Negative  Negative, Inconclusive Final    POC Methamphetamine 04/03/2023 Negative  Negative, Inconclusive Final    POC Opiates 04/03/2023 Presumptive Positive (A)  Negative, Inconclusive Final    POC Oxycodone 04/03/2023 Negative  Negative, Inconclusive Final    POC Phencyclidine 04/03/2023 Negative  Negative, Inconclusive Final    POC Methylenedioxymethamphetamine * 04/03/2023 Negative  Negative, Inconclusive Final    POC Tricyclic Antidepressants 04/03/2023 Negative  Negative, Inconclusive Final    POC Buprenorphine 04/03/2023 Negative   Final     Acceptable 04/03/2023 Yes   Final    POC Temperature (Urine) 04/03/2023 90   Final   Office Visit on 02/27/2023   Component Date Value Ref Range Status    Sodium 02/27/2023 139  136 - 145 mmol/L Final    Potassium 02/27/2023 3.4 (L)  3.5 - 5.1 mmol/L Final    Chloride 02/27/2023 105  98 - 107 mmol/L Final    CO2 02/27/2023 30  21 - 32 mmol/L Final    Anion Gap 02/27/2023 7  7 - 16 mmol/L Final    Glucose 02/27/2023 89  74 - 106 mg/dL Final    BUN 02/27/2023 8  7 - 18 mg/dL Final    Creatinine 02/27/2023 1.00  0.55 - 1.02 mg/dL Final    BUN/Creatinine Ratio 02/27/2023 8  6 - 20 Final    Calcium 02/27/2023 8.9  8.5 - 10.1 mg/dL Final    Total Protein 02/27/2023 7.8  6.4 - 8.2 g/dL Final    Albumin 02/27/2023 3.4 (L)  3.5 - 5.0 g/dL Final    Globulin 02/27/2023 4.4 (H)  2.0 - 4.0 g/dL Final    A/G Ratio 02/27/2023 0.8   Final    Bilirubin, Total 02/27/2023 0.6  >0.0 - 1.2 mg/dL Final    Alk Phos 02/27/2023 125 (H)  41 - 108 U/L Final    ALT 02/27/2023 36  13 - 56 U/L Final    AST 02/27/2023 28  15 - 37 U/L Final    eGFR 02/27/2023 68  >=60 mL/min/1.73m² Final    Hemoglobin A1C 02/27/2023 7.0 (H)  4.5 - 6.6 %  Final    Estimated Average Glucose 02/27/2023 147  mg/dL Final    WBC 02/27/2023 5.64  4.50 - 11.00 K/uL Final    RBC 02/27/2023 3.77 (L)  4.20 - 5.40 M/uL Final    Hemoglobin 02/27/2023 11.0 (L)  12.0 - 16.0 g/dL Final    Hematocrit 02/27/2023 34.1 (L)  38.0 - 47.0 % Final    MCV 02/27/2023 90.5  80.0 - 96.0 fL Final    MCH 02/27/2023 29.2  27.0 - 31.0 pg Final    MCHC 02/27/2023 32.3  32.0 - 36.0 g/dL Final    RDW 02/27/2023 16.1 (H)  11.5 - 14.5 % Final    Platelet Count 02/27/2023 349  150 - 400 K/uL Final    MPV 02/27/2023 9.9  9.4 - 12.4 fL Final    Neutrophils % 02/27/2023 54.0  53.0 - 65.0 % Final    Lymphocytes % 02/27/2023 30.5  27.0 - 41.0 % Final    Monocytes % 02/27/2023 8.2 (H)  2.0 - 6.0 % Final    Eosinophils % 02/27/2023 6.0 (H)  1.0 - 4.0 % Final    Basophils % 02/27/2023 1.1 (H)  0.0 - 1.0 % Final    Immature Granulocytes % 02/27/2023 0.2  0.0 - 0.4 % Final    nRBC, Auto 02/27/2023 0.0  <=0.0 % Final    Neutrophils, Abs 02/27/2023 3.05  1.80 - 7.70 K/uL Final    Lymphocytes, Absolute 02/27/2023 1.72  1.00 - 4.80 K/uL Final    Monocytes, Absolute 02/27/2023 0.46  0.00 - 0.80 K/uL Final    Eosinophils, Absolute 02/27/2023 0.34  0.00 - 0.50 K/uL Final    Basophils, Absolute 02/27/2023 0.06  0.00 - 0.20 K/uL Final    Immature Granulocytes, Absolute 02/27/2023 0.01  0.00 - 0.04 K/uL Final    nRBC, Absolute 02/27/2023 0.00  <=0.00 x10e3/uL Final    Diff Type 02/27/2023 Auto   Final   Patient Outreach on 02/20/2023   Component Date Value Ref Range Status    PAP Recommendation External 12/19/2017 Pap in 3 years   Final   Office Visit on 12/06/2022   Component Date Value Ref Range Status    POC Amphetamines 12/06/2022 Negative  Negative, Inconclusive Final    POC Barbiturates 12/06/2022 Negative  Negative, Inconclusive Final    POC Benzodiazepines 12/06/2022 Negative  Negative, Inconclusive Final    POC Cocaine 12/06/2022 Negative  Negative, Inconclusive Final    POC THC 12/06/2022 Negative  Negative,  Inconclusive Final    POC Methadone 12/06/2022 Negative  Negative, Inconclusive Final    POC Methamphetamine 12/06/2022 Negative  Negative, Inconclusive Final    POC Opiates 12/06/2022 Presumptive Positive (A)  Negative, Inconclusive Final    POC Oxycodone 12/06/2022 Negative  Negative, Inconclusive Final    POC Phencyclidine 12/06/2022 Negative  Negative, Inconclusive Final    POC Methylenedioxymethamphetamine * 12/06/2022 Negative  Negative, Inconclusive Final    POC Tricyclic Antidepressants 12/06/2022 Negative  Negative, Inconclusive Final    POC Buprenorphine 12/06/2022 Negative   Final     Acceptable 12/06/2022 Yes   Final    POC Temperature (Urine) 12/06/2022 90   Final         No orders of the defined types were placed in this encounter.        Requested Prescriptions     Signed Prescriptions Disp Refills    HYDROcodone-acetaminophen (NORCO)  mg per tablet 60 tablet 0     Sig: Take 1 tablet by mouth every 12 (twelve) hours as needed for Pain.    HYDROcodone-acetaminophen (NORCO)  mg per tablet 60 tablet 0     Sig: Take 1 tablet by mouth every 12 (twelve) hours as needed for Pain.       Assessment:     1. Rheumatoid arthritis of multiple sites without rheumatoid factor    2. Cervical radiculopathy           A's of Opioid Risk Assessment  Activity:Patient can perform ADL.   Analgesia:Patients pain is partially controlled by current medication. Patient has tried OTC medications such as Tylenol and Ibuprofen with out relief.   Adverse Effects: Patient denies constipation or sedation.  Aberrant Behavior:  reviewed with no aberrant drug seeking/taking behavior.  Overdose reversal drug naloxone discussed    Drug screen reviewed      Plan:    Narcan February 2023    She  tramadol from primary care provider March 2, 2023  This prescription was destroyed    She verbalizes if this happens again she will no longer receive narcotics [facility]    Follows  rheumatology rheumatoid arthritis ARM    No new complaints she states current medications helping control her discomfort     She would like to continue conservative management    Continue home exercise program as directed    Continue current medication    Follow-up 2 months    Dr. Claire, August 2023    Bring original prescription medication bottles/container/box with labels to each visit

## 2023-05-31 ENCOUNTER — OFFICE VISIT (OUTPATIENT)
Dept: PAIN MEDICINE | Facility: CLINIC | Age: 53
End: 2023-05-31
Payer: MEDICARE

## 2023-05-31 VITALS
WEIGHT: 293 LBS | SYSTOLIC BLOOD PRESSURE: 152 MMHG | RESPIRATION RATE: 18 BRPM | BODY MASS INDEX: 43.4 KG/M2 | HEIGHT: 69 IN | HEART RATE: 64 BPM | DIASTOLIC BLOOD PRESSURE: 58 MMHG

## 2023-05-31 DIAGNOSIS — M54.12 CERVICAL RADICULOPATHY: Chronic | ICD-10-CM

## 2023-05-31 DIAGNOSIS — M06.09 RHEUMATOID ARTHRITIS OF MULTIPLE SITES WITHOUT RHEUMATOID FACTOR: Primary | Chronic | ICD-10-CM

## 2023-05-31 PROCEDURE — 3078F DIAST BP <80 MM HG: CPT | Mod: CPTII,,, | Performed by: PHYSICIAN ASSISTANT

## 2023-05-31 PROCEDURE — 3077F PR MOST RECENT SYSTOLIC BLOOD PRESSURE >= 140 MM HG: ICD-10-PCS | Mod: CPTII,,, | Performed by: PHYSICIAN ASSISTANT

## 2023-05-31 PROCEDURE — 99215 OFFICE O/P EST HI 40 MIN: CPT | Mod: PBBFAC | Performed by: PHYSICIAN ASSISTANT

## 2023-05-31 PROCEDURE — 3051F PR MOST RECENT HEMOGLOBIN A1C LEVEL 7.0 - < 8.0%: ICD-10-PCS | Mod: CPTII,,, | Performed by: PHYSICIAN ASSISTANT

## 2023-05-31 PROCEDURE — 3051F HG A1C>EQUAL 7.0%<8.0%: CPT | Mod: CPTII,,, | Performed by: PHYSICIAN ASSISTANT

## 2023-05-31 PROCEDURE — 3008F BODY MASS INDEX DOCD: CPT | Mod: CPTII,,, | Performed by: PHYSICIAN ASSISTANT

## 2023-05-31 PROCEDURE — 1159F PR MEDICATION LIST DOCUMENTED IN MEDICAL RECORD: ICD-10-PCS | Mod: CPTII,,, | Performed by: PHYSICIAN ASSISTANT

## 2023-05-31 PROCEDURE — 1159F MED LIST DOCD IN RCRD: CPT | Mod: CPTII,,, | Performed by: PHYSICIAN ASSISTANT

## 2023-05-31 PROCEDURE — 3078F PR MOST RECENT DIASTOLIC BLOOD PRESSURE < 80 MM HG: ICD-10-PCS | Mod: CPTII,,, | Performed by: PHYSICIAN ASSISTANT

## 2023-05-31 PROCEDURE — 3008F PR BODY MASS INDEX (BMI) DOCUMENTED: ICD-10-PCS | Mod: CPTII,,, | Performed by: PHYSICIAN ASSISTANT

## 2023-05-31 PROCEDURE — 3077F SYST BP >= 140 MM HG: CPT | Mod: CPTII,,, | Performed by: PHYSICIAN ASSISTANT

## 2023-05-31 PROCEDURE — 99214 PR OFFICE/OUTPT VISIT, EST, LEVL IV, 30-39 MIN: ICD-10-PCS | Mod: S$PBB,,, | Performed by: PHYSICIAN ASSISTANT

## 2023-05-31 PROCEDURE — 99214 OFFICE O/P EST MOD 30 MIN: CPT | Mod: S$PBB,,, | Performed by: PHYSICIAN ASSISTANT

## 2023-05-31 RX ORDER — HYDROCODONE BITARTRATE AND ACETAMINOPHEN 10; 325 MG/1; MG/1
1 TABLET ORAL EVERY 12 HOURS PRN
Qty: 60 TABLET | Refills: 0 | Status: SHIPPED | OUTPATIENT
Start: 2023-06-07 | End: 2023-08-01 | Stop reason: SDUPTHER

## 2023-05-31 RX ORDER — HYDROCODONE BITARTRATE AND ACETAMINOPHEN 10; 325 MG/1; MG/1
1 TABLET ORAL EVERY 12 HOURS PRN
Qty: 60 TABLET | Refills: 0 | Status: SHIPPED | OUTPATIENT
Start: 2023-07-07 | End: 2023-08-01 | Stop reason: SDUPTHER

## 2023-06-07 ENCOUNTER — OFFICE VISIT (OUTPATIENT)
Dept: PRIMARY CARE CLINIC | Facility: CLINIC | Age: 53
End: 2023-06-07
Payer: MEDICARE

## 2023-06-07 VITALS
WEIGHT: 293 LBS | BODY MASS INDEX: 43.4 KG/M2 | SYSTOLIC BLOOD PRESSURE: 136 MMHG | HEIGHT: 69 IN | DIASTOLIC BLOOD PRESSURE: 80 MMHG | TEMPERATURE: 103 F | RESPIRATION RATE: 20 BRPM | HEART RATE: 103 BPM | OXYGEN SATURATION: 94 %

## 2023-06-07 DIAGNOSIS — R07.0 THROAT PAIN: Primary | ICD-10-CM

## 2023-06-07 DIAGNOSIS — J02.9 PHARYNGITIS, UNSPECIFIED ETIOLOGY: ICD-10-CM

## 2023-06-07 DIAGNOSIS — E11.9 TYPE 2 DIABETES MELLITUS WITHOUT COMPLICATION, WITHOUT LONG-TERM CURRENT USE OF INSULIN: ICD-10-CM

## 2023-06-07 DIAGNOSIS — M05.79 RHEUMATOID ARTHRITIS INVOLVING MULTIPLE SITES WITH POSITIVE RHEUMATOID FACTOR: ICD-10-CM

## 2023-06-07 LAB
CTP QC/QA: YES
S PYO RRNA THROAT QL PROBE: NEGATIVE

## 2023-06-07 PROCEDURE — 3008F PR BODY MASS INDEX (BMI) DOCUMENTED: ICD-10-PCS | Mod: ,,, | Performed by: FAMILY MEDICINE

## 2023-06-07 PROCEDURE — 3079F DIAST BP 80-89 MM HG: CPT | Mod: ,,, | Performed by: FAMILY MEDICINE

## 2023-06-07 PROCEDURE — 3051F PR MOST RECENT HEMOGLOBIN A1C LEVEL 7.0 - < 8.0%: ICD-10-PCS | Mod: ,,, | Performed by: FAMILY MEDICINE

## 2023-06-07 PROCEDURE — 96372 PR INJECTION,THERAP/PROPH/DIAG2ST, IM OR SUBCUT: ICD-10-PCS | Mod: ,,, | Performed by: FAMILY MEDICINE

## 2023-06-07 PROCEDURE — 99213 OFFICE O/P EST LOW 20 MIN: CPT | Mod: 25,,, | Performed by: FAMILY MEDICINE

## 2023-06-07 PROCEDURE — 3075F PR MOST RECENT SYSTOLIC BLOOD PRESS GE 130-139MM HG: ICD-10-PCS | Mod: ,,, | Performed by: FAMILY MEDICINE

## 2023-06-07 PROCEDURE — 96372 THER/PROPH/DIAG INJ SC/IM: CPT | Mod: ,,, | Performed by: FAMILY MEDICINE

## 2023-06-07 PROCEDURE — 1159F MED LIST DOCD IN RCRD: CPT | Mod: ,,, | Performed by: FAMILY MEDICINE

## 2023-06-07 PROCEDURE — 3079F PR MOST RECENT DIASTOLIC BLOOD PRESSURE 80-89 MM HG: ICD-10-PCS | Mod: ,,, | Performed by: FAMILY MEDICINE

## 2023-06-07 PROCEDURE — 1159F PR MEDICATION LIST DOCUMENTED IN MEDICAL RECORD: ICD-10-PCS | Mod: ,,, | Performed by: FAMILY MEDICINE

## 2023-06-07 PROCEDURE — 87880 STREP A ASSAY W/OPTIC: CPT | Mod: QW,,, | Performed by: FAMILY MEDICINE

## 2023-06-07 PROCEDURE — 1160F RVW MEDS BY RX/DR IN RCRD: CPT | Mod: ,,, | Performed by: FAMILY MEDICINE

## 2023-06-07 PROCEDURE — 99213 PR OFFICE/OUTPT VISIT, EST, LEVL III, 20-29 MIN: ICD-10-PCS | Mod: 25,,, | Performed by: FAMILY MEDICINE

## 2023-06-07 PROCEDURE — 1160F PR REVIEW ALL MEDS BY PRESCRIBER/CLIN PHARMACIST DOCUMENTED: ICD-10-PCS | Mod: ,,, | Performed by: FAMILY MEDICINE

## 2023-06-07 PROCEDURE — 87880 POCT RAPID STREP A: ICD-10-PCS | Mod: QW,,, | Performed by: FAMILY MEDICINE

## 2023-06-07 PROCEDURE — 3008F BODY MASS INDEX DOCD: CPT | Mod: ,,, | Performed by: FAMILY MEDICINE

## 2023-06-07 PROCEDURE — 3075F SYST BP GE 130 - 139MM HG: CPT | Mod: ,,, | Performed by: FAMILY MEDICINE

## 2023-06-07 PROCEDURE — 3051F HG A1C>EQUAL 7.0%<8.0%: CPT | Mod: ,,, | Performed by: FAMILY MEDICINE

## 2023-06-07 RX ORDER — PENICILLIN V POTASSIUM 500 MG/1
500 TABLET, FILM COATED ORAL EVERY 6 HOURS
Qty: 40 TABLET | Refills: 0 | Status: SHIPPED | OUTPATIENT
Start: 2023-06-07 | End: 2023-12-28 | Stop reason: ALTCHOICE

## 2023-06-07 RX ORDER — KETOROLAC TROMETHAMINE 30 MG/ML
60 INJECTION, SOLUTION INTRAMUSCULAR; INTRAVENOUS
Status: COMPLETED | OUTPATIENT
Start: 2023-06-07 | End: 2023-06-07

## 2023-06-07 RX ORDER — TRAMADOL HYDROCHLORIDE 50 MG/1
TABLET ORAL
COMMUNITY
Start: 2023-05-15

## 2023-06-07 RX ORDER — LINCOMYCIN HYDROCHLORIDE 300 MG/ML
600 INJECTION, SOLUTION INTRAMUSCULAR; INTRAVENOUS; SUBCONJUNCTIVAL
Status: COMPLETED | OUTPATIENT
Start: 2023-06-07 | End: 2023-06-07

## 2023-06-07 RX ORDER — ESOMEPRAZOLE SODIUM 40 MG/5ML
INJECTION INTRAVENOUS
COMMUNITY
End: 2024-03-13

## 2023-06-07 RX ADMIN — KETOROLAC TROMETHAMINE 60 MG: 30 INJECTION, SOLUTION INTRAMUSCULAR; INTRAVENOUS at 01:06

## 2023-06-07 RX ADMIN — LINCOMYCIN HYDROCHLORIDE 600 MG: 300 INJECTION, SOLUTION INTRAMUSCULAR; INTRAVENOUS; SUBCONJUNCTIVAL at 01:06

## 2023-06-07 NOTE — PROGRESS NOTES
Subjective     Patient ID: Natali Claire is a 52 y.o. female.    Chief Complaint: Sore Throat and Fever    Started 2 days ago. Requesting shots    Sore Throat   Pertinent negatives include no congestion, coughing, diarrhea, headaches, shortness of breath or vomiting.   Fever   Associated symptoms include a sore throat. Pertinent negatives include no chest pain, congestion, coughing, diarrhea, headaches, nausea or vomiting.   Review of Systems   Constitutional:  Positive for fever. Negative for fatigue.   HENT:  Positive for sore throat. Negative for nasal congestion and dental problem.    Eyes:  Negative for discharge.   Respiratory:  Negative for cough and shortness of breath.    Cardiovascular:  Negative for chest pain.   Gastrointestinal:  Negative for constipation, diarrhea, nausea and vomiting.   Genitourinary:  Negative for bladder incontinence, difficulty urinating and hot flashes.   Musculoskeletal:  Positive for arthralgias.   Allergic/Immunologic: Negative for environmental allergies.   Neurological:  Negative for headaches.   Psychiatric/Behavioral:  Negative for behavioral problems and confusion.         Objective     Physical Exam  Vitals and nursing note reviewed.   Constitutional:       Appearance: Normal appearance. She is normal weight.   HENT:      Head: Normocephalic and atraumatic.      Right Ear: Tympanic membrane normal.      Left Ear: Tympanic membrane normal.      Nose: Nose normal.      Mouth/Throat:      Mouth: Mucous membranes are moist.      Pharynx: Posterior oropharyngeal erythema present. No oropharyngeal exudate.   Eyes:      Extraocular Movements: Extraocular movements intact.      Conjunctiva/sclera: Conjunctivae normal.      Pupils: Pupils are equal, round, and reactive to light.   Cardiovascular:      Rate and Rhythm: Normal rate and regular rhythm.      Pulses: Normal pulses.   Pulmonary:      Effort: Pulmonary effort is normal.      Breath sounds: Normal breath sounds.    Abdominal:      General: Abdomen is flat. Bowel sounds are normal.      Palpations: Abdomen is soft.   Musculoskeletal:         General: Normal range of motion.      Cervical back: Normal range of motion and neck supple.   Skin:     General: Skin is warm and dry.   Neurological:      General: No focal deficit present.      Mental Status: She is alert and oriented to person, place, and time.   Psychiatric:         Mood and Affect: Mood normal.          Assessment and Plan     1. Throat pain  -     POCT rapid strep A    2. Rheumatoid arthritis involving multiple sites with positive rheumatoid factor    3. Type 2 diabetes mellitus without complication, without long-term current use of insulin    4. Pharyngitis, unspecified etiology  -     lincomycin injection 600 mg  -     ketorolac injection 60 mg  -     penicillin v potassium (VEETID) 500 MG tablet; Take 1 tablet (500 mg total) by mouth every 6 (six) hours.  Dispense: 40 tablet; Refill: 0        RTC 1 week         No follow-ups on file.

## 2023-06-09 DIAGNOSIS — Z71.89 COMPLEX CARE COORDINATION: ICD-10-CM

## 2023-06-14 ENCOUNTER — OFFICE VISIT (OUTPATIENT)
Dept: PRIMARY CARE CLINIC | Facility: CLINIC | Age: 53
End: 2023-06-14
Payer: MEDICARE

## 2023-06-14 VITALS
BODY MASS INDEX: 43.4 KG/M2 | SYSTOLIC BLOOD PRESSURE: 130 MMHG | TEMPERATURE: 97 F | HEIGHT: 69 IN | HEART RATE: 61 BPM | WEIGHT: 293 LBS | DIASTOLIC BLOOD PRESSURE: 62 MMHG | OXYGEN SATURATION: 99 % | RESPIRATION RATE: 18 BRPM

## 2023-06-14 DIAGNOSIS — I10 PRIMARY HYPERTENSION: ICD-10-CM

## 2023-06-14 DIAGNOSIS — J02.9 PHARYNGITIS, UNSPECIFIED ETIOLOGY: ICD-10-CM

## 2023-06-14 DIAGNOSIS — E78.5 HYPERLIPIDEMIA, UNSPECIFIED HYPERLIPIDEMIA TYPE: ICD-10-CM

## 2023-06-14 DIAGNOSIS — M05.79 RHEUMATOID ARTHRITIS INVOLVING MULTIPLE SITES WITH POSITIVE RHEUMATOID FACTOR: ICD-10-CM

## 2023-06-14 DIAGNOSIS — E11.9 TYPE 2 DIABETES MELLITUS WITHOUT COMPLICATION, WITHOUT LONG-TERM CURRENT USE OF INSULIN: Primary | ICD-10-CM

## 2023-06-14 LAB
ALBUMIN SERPL BCP-MCNC: 3 G/DL (ref 3.5–5)
ALBUMIN/GLOB SERPL: 0.7 {RATIO}
ALP SERPL-CCNC: 110 U/L (ref 41–108)
ALT SERPL W P-5'-P-CCNC: 27 U/L (ref 13–56)
ANION GAP SERPL CALCULATED.3IONS-SCNC: 5 MMOL/L (ref 7–16)
AST SERPL W P-5'-P-CCNC: 19 U/L (ref 15–37)
BASOPHILS # BLD AUTO: 0.07 K/UL (ref 0–0.2)
BASOPHILS NFR BLD AUTO: 1.4 % (ref 0–1)
BILIRUB SERPL-MCNC: 0.4 MG/DL (ref ?–1.2)
BUN SERPL-MCNC: 11 MG/DL (ref 7–18)
BUN/CREAT SERPL: 10 (ref 6–20)
CALCIUM SERPL-MCNC: 9 MG/DL (ref 8.5–10.1)
CHLORIDE SERPL-SCNC: 105 MMOL/L (ref 98–107)
CHOLEST SERPL-MCNC: 158 MG/DL (ref 0–200)
CHOLEST/HDLC SERPL: 5.1 {RATIO}
CO2 SERPL-SCNC: 30 MMOL/L (ref 21–32)
CREAT SERPL-MCNC: 1.06 MG/DL (ref 0.55–1.02)
DIFFERENTIAL METHOD BLD: ABNORMAL
EGFR (NO RACE VARIABLE) (RUSH/TITUS): 63 ML/MIN/1.73M2
EOSINOPHIL # BLD AUTO: 0.33 K/UL (ref 0–0.5)
EOSINOPHIL NFR BLD AUTO: 6.5 % (ref 1–4)
ERYTHROCYTE [DISTWIDTH] IN BLOOD BY AUTOMATED COUNT: 15.1 % (ref 11.5–14.5)
EST. AVERAGE GLUCOSE BLD GHB EST-MCNC: 154 MG/DL
GLOBULIN SER-MCNC: 4.2 G/DL (ref 2–4)
GLUCOSE SERPL-MCNC: 139 MG/DL (ref 74–106)
HBA1C MFR BLD HPLC: 7.2 % (ref 4.5–6.6)
HCT VFR BLD AUTO: 35.3 % (ref 38–47)
HDLC SERPL-MCNC: 31 MG/DL (ref 40–60)
HGB BLD-MCNC: 11.5 G/DL (ref 12–16)
IMM GRANULOCYTES # BLD AUTO: 0.03 K/UL (ref 0–0.04)
IMM GRANULOCYTES NFR BLD: 0.6 % (ref 0–0.4)
LDLC SERPL CALC-MCNC: 102 MG/DL
LDLC/HDLC SERPL: 3.3 {RATIO}
LYMPHOCYTES # BLD AUTO: 1.95 K/UL (ref 1–4.8)
LYMPHOCYTES NFR BLD AUTO: 38.2 % (ref 27–41)
MCH RBC QN AUTO: 29.6 PG (ref 27–31)
MCHC RBC AUTO-ENTMCNC: 32.6 G/DL (ref 32–36)
MCV RBC AUTO: 91 FL (ref 80–96)
MONOCYTES # BLD AUTO: 0.37 K/UL (ref 0–0.8)
MONOCYTES NFR BLD AUTO: 7.3 % (ref 2–6)
MPC BLD CALC-MCNC: 9.6 FL (ref 9.4–12.4)
NEUTROPHILS # BLD AUTO: 2.35 K/UL (ref 1.8–7.7)
NEUTROPHILS NFR BLD AUTO: 46 % (ref 53–65)
NONHDLC SERPL-MCNC: 127 MG/DL
NRBC # BLD AUTO: 0 X10E3/UL
NRBC, AUTO (.00): 0 %
PLATELET # BLD AUTO: 343 K/UL (ref 150–400)
POTASSIUM SERPL-SCNC: 3.4 MMOL/L (ref 3.5–5.1)
PROT SERPL-MCNC: 7.2 G/DL (ref 6.4–8.2)
RBC # BLD AUTO: 3.88 M/UL (ref 4.2–5.4)
SODIUM SERPL-SCNC: 137 MMOL/L (ref 136–145)
TRIGL SERPL-MCNC: 125 MG/DL (ref 35–150)
VLDLC SERPL-MCNC: 25 MG/DL
WBC # BLD AUTO: 5.1 K/UL (ref 4.5–11)

## 2023-06-14 PROCEDURE — 83036 HEMOGLOBIN A1C: ICD-10-PCS | Mod: ,,, | Performed by: CLINICAL MEDICAL LABORATORY

## 2023-06-14 PROCEDURE — 3075F SYST BP GE 130 - 139MM HG: CPT | Mod: ,,, | Performed by: FAMILY MEDICINE

## 2023-06-14 PROCEDURE — 3078F DIAST BP <80 MM HG: CPT | Mod: ,,, | Performed by: FAMILY MEDICINE

## 2023-06-14 PROCEDURE — 85025 CBC WITH DIFFERENTIAL: ICD-10-PCS | Mod: ,,, | Performed by: CLINICAL MEDICAL LABORATORY

## 2023-06-14 PROCEDURE — 80061 LIPID PANEL: ICD-10-PCS | Mod: ,,, | Performed by: CLINICAL MEDICAL LABORATORY

## 2023-06-14 PROCEDURE — 99213 OFFICE O/P EST LOW 20 MIN: CPT | Mod: ,,, | Performed by: FAMILY MEDICINE

## 2023-06-14 PROCEDURE — 99213 PR OFFICE/OUTPT VISIT, EST, LEVL III, 20-29 MIN: ICD-10-PCS | Mod: ,,, | Performed by: FAMILY MEDICINE

## 2023-06-14 PROCEDURE — 3051F PR MOST RECENT HEMOGLOBIN A1C LEVEL 7.0 - < 8.0%: ICD-10-PCS | Mod: ,,, | Performed by: FAMILY MEDICINE

## 2023-06-14 PROCEDURE — 3078F PR MOST RECENT DIASTOLIC BLOOD PRESSURE < 80 MM HG: ICD-10-PCS | Mod: ,,, | Performed by: FAMILY MEDICINE

## 2023-06-14 PROCEDURE — 80053 COMPREHENSIVE METABOLIC PANEL: ICD-10-PCS | Mod: ,,, | Performed by: CLINICAL MEDICAL LABORATORY

## 2023-06-14 PROCEDURE — 3008F BODY MASS INDEX DOCD: CPT | Mod: ,,, | Performed by: FAMILY MEDICINE

## 2023-06-14 PROCEDURE — 80053 COMPREHEN METABOLIC PANEL: CPT | Mod: ,,, | Performed by: CLINICAL MEDICAL LABORATORY

## 2023-06-14 PROCEDURE — 80061 LIPID PANEL: CPT | Mod: ,,, | Performed by: CLINICAL MEDICAL LABORATORY

## 2023-06-14 PROCEDURE — 3075F PR MOST RECENT SYSTOLIC BLOOD PRESS GE 130-139MM HG: ICD-10-PCS | Mod: ,,, | Performed by: FAMILY MEDICINE

## 2023-06-14 PROCEDURE — 3051F HG A1C>EQUAL 7.0%<8.0%: CPT | Mod: ,,, | Performed by: FAMILY MEDICINE

## 2023-06-14 PROCEDURE — 83036 HEMOGLOBIN GLYCOSYLATED A1C: CPT | Mod: ,,, | Performed by: CLINICAL MEDICAL LABORATORY

## 2023-06-14 PROCEDURE — 85025 COMPLETE CBC W/AUTO DIFF WBC: CPT | Mod: ,,, | Performed by: CLINICAL MEDICAL LABORATORY

## 2023-06-14 PROCEDURE — 3008F PR BODY MASS INDEX (BMI) DOCUMENTED: ICD-10-PCS | Mod: ,,, | Performed by: FAMILY MEDICINE

## 2023-06-14 NOTE — PROGRESS NOTES
Subjective     Patient ID: Natali Claire is a 52 y.o. female.    Chief Complaint: Diabetes    Sore throat is gone. To finish meds    Diabetes  Pertinent negatives for hypoglycemia include no confusion or headaches. Pertinent negatives for diabetes include no chest pain and no fatigue.   Review of Systems   Constitutional:  Negative for fatigue and fever.   HENT:  Negative for nasal congestion and dental problem.    Eyes:  Negative for discharge.   Respiratory:  Negative for cough and shortness of breath.    Cardiovascular:  Negative for chest pain.   Gastrointestinal:  Negative for constipation, diarrhea, nausea and vomiting.   Genitourinary:  Negative for bladder incontinence, difficulty urinating and hot flashes.   Allergic/Immunologic: Negative for environmental allergies.   Neurological:  Negative for headaches.   Psychiatric/Behavioral:  Negative for behavioral problems and confusion.         Objective     Physical Exam  Vitals and nursing note reviewed.   Constitutional:       Appearance: Normal appearance. She is normal weight.   HENT:      Head: Normocephalic and atraumatic.      Right Ear: Tympanic membrane normal.      Left Ear: Tympanic membrane normal.      Nose: Nose normal.      Mouth/Throat:      Mouth: Mucous membranes are moist.   Eyes:      Extraocular Movements: Extraocular movements intact.      Conjunctiva/sclera: Conjunctivae normal.      Pupils: Pupils are equal, round, and reactive to light.   Cardiovascular:      Rate and Rhythm: Normal rate and regular rhythm.      Pulses: Normal pulses.   Pulmonary:      Effort: Pulmonary effort is normal.      Breath sounds: Normal breath sounds.   Abdominal:      General: Abdomen is flat. Bowel sounds are normal.      Palpations: Abdomen is soft.   Musculoskeletal:         General: Normal range of motion.      Cervical back: Normal range of motion and neck supple.   Skin:     General: Skin is warm and dry.   Neurological:      General: No focal deficit  present.      Mental Status: She is alert and oriented to person, place, and time.   Psychiatric:         Mood and Affect: Mood normal.          Assessment and Plan     1. Type 2 diabetes mellitus without complication, without long-term current use of insulin  -     CBC Auto Differential; Future; Expected date: 06/14/2023  -     Comprehensive Metabolic Panel; Future; Expected date: 06/14/2023  -     Hemoglobin A1C; Future; Expected date: 06/14/2023    2. Primary hypertension  -     CBC Auto Differential; Future; Expected date: 06/14/2023  -     Comprehensive Metabolic Panel; Future; Expected date: 06/14/2023    3. Hyperlipidemia, unspecified hyperlipidemia type  -     Lipid Panel; Future; Expected date: 06/14/2023    4. Rheumatoid arthritis involving multiple sites with positive rheumatoid factor    5. Pharyngitis, unspecified etiology        Finish meds  RTC as needed  Will call lab results         No follow-ups on file.

## 2023-06-16 DIAGNOSIS — E11.9 TYPE 2 DIABETES MELLITUS WITHOUT COMPLICATION, WITHOUT LONG-TERM CURRENT USE OF INSULIN: ICD-10-CM

## 2023-06-19 RX ORDER — MONTELUKAST SODIUM 10 MG/1
10 TABLET ORAL NIGHTLY
Qty: 30 TABLET | Refills: 5 | Status: SHIPPED | OUTPATIENT
Start: 2023-06-19 | End: 2024-02-26

## 2023-06-30 ENCOUNTER — EXTERNAL CHRONIC CARE MANAGEMENT (OUTPATIENT)
Dept: PRIMARY CARE CLINIC | Facility: CLINIC | Age: 53
End: 2023-06-30
Payer: MEDICARE

## 2023-06-30 PROCEDURE — G0511 CCM/BHI BY RHC/FQHC 20MIN MO: HCPCS | Mod: ,,, | Performed by: FAMILY MEDICINE

## 2023-06-30 PROCEDURE — G0511 PR CHRONIC CARE MGMT, RHC OR FQHC ONLY, 20 MINS OR MORE: ICD-10-PCS | Mod: ,,, | Performed by: FAMILY MEDICINE

## 2023-07-05 DIAGNOSIS — I10 HYPERTENSION, UNSPECIFIED TYPE: ICD-10-CM

## 2023-07-10 RX ORDER — METOPROLOL TARTRATE 50 MG/1
TABLET ORAL
Qty: 180 TABLET | Refills: 1 | Status: SHIPPED | OUTPATIENT
Start: 2023-07-10 | End: 2024-02-21

## 2023-07-19 ENCOUNTER — PATIENT MESSAGE (OUTPATIENT)
Dept: ADMINISTRATIVE | Facility: HOSPITAL | Age: 53
End: 2023-07-19

## 2023-07-26 ENCOUNTER — OFFICE VISIT (OUTPATIENT)
Dept: PRIMARY CARE CLINIC | Facility: CLINIC | Age: 53
End: 2023-07-26
Payer: COMMERCIAL

## 2023-07-26 VITALS
HEART RATE: 70 BPM | BODY MASS INDEX: 43.4 KG/M2 | HEIGHT: 69 IN | OXYGEN SATURATION: 99 % | SYSTOLIC BLOOD PRESSURE: 134 MMHG | WEIGHT: 293 LBS | DIASTOLIC BLOOD PRESSURE: 73 MMHG | TEMPERATURE: 98 F | RESPIRATION RATE: 18 BRPM

## 2023-07-26 DIAGNOSIS — M54.41 CHRONIC RIGHT-SIDED LOW BACK PAIN WITH RIGHT-SIDED SCIATICA: Primary | ICD-10-CM

## 2023-07-26 DIAGNOSIS — I10 PRIMARY HYPERTENSION: ICD-10-CM

## 2023-07-26 DIAGNOSIS — G89.29 CHRONIC RIGHT-SIDED LOW BACK PAIN WITH RIGHT-SIDED SCIATICA: Primary | ICD-10-CM

## 2023-07-26 DIAGNOSIS — E11.9 TYPE 2 DIABETES MELLITUS WITHOUT COMPLICATION, WITHOUT LONG-TERM CURRENT USE OF INSULIN: ICD-10-CM

## 2023-07-26 LAB — GLUCOSE SERPL-MCNC: 131 MG/DL (ref 70–110)

## 2023-07-26 PROCEDURE — 3078F PR MOST RECENT DIASTOLIC BLOOD PRESSURE < 80 MM HG: ICD-10-PCS | Mod: CPTII,,, | Performed by: NURSE PRACTITIONER

## 2023-07-26 PROCEDURE — 3051F HG A1C>EQUAL 7.0%<8.0%: CPT | Mod: CPTII,,, | Performed by: NURSE PRACTITIONER

## 2023-07-26 PROCEDURE — 96372 THER/PROPH/DIAG INJ SC/IM: CPT | Mod: ,,, | Performed by: NURSE PRACTITIONER

## 2023-07-26 PROCEDURE — 3051F PR MOST RECENT HEMOGLOBIN A1C LEVEL 7.0 - < 8.0%: ICD-10-PCS | Mod: CPTII,,, | Performed by: NURSE PRACTITIONER

## 2023-07-26 PROCEDURE — 3008F BODY MASS INDEX DOCD: CPT | Mod: CPTII,,, | Performed by: NURSE PRACTITIONER

## 2023-07-26 PROCEDURE — 3075F PR MOST RECENT SYSTOLIC BLOOD PRESS GE 130-139MM HG: ICD-10-PCS | Mod: CPTII,,, | Performed by: NURSE PRACTITIONER

## 2023-07-26 PROCEDURE — 1159F MED LIST DOCD IN RCRD: CPT | Mod: CPTII,,, | Performed by: NURSE PRACTITIONER

## 2023-07-26 PROCEDURE — 1160F RVW MEDS BY RX/DR IN RCRD: CPT | Mod: CPTII,,, | Performed by: NURSE PRACTITIONER

## 2023-07-26 PROCEDURE — 3008F PR BODY MASS INDEX (BMI) DOCUMENTED: ICD-10-PCS | Mod: CPTII,,, | Performed by: NURSE PRACTITIONER

## 2023-07-26 PROCEDURE — 3075F SYST BP GE 130 - 139MM HG: CPT | Mod: CPTII,,, | Performed by: NURSE PRACTITIONER

## 2023-07-26 PROCEDURE — 1160F PR REVIEW ALL MEDS BY PRESCRIBER/CLIN PHARMACIST DOCUMENTED: ICD-10-PCS | Mod: CPTII,,, | Performed by: NURSE PRACTITIONER

## 2023-07-26 PROCEDURE — 96372 PR INJECTION,THERAP/PROPH/DIAG2ST, IM OR SUBCUT: ICD-10-PCS | Mod: ,,, | Performed by: NURSE PRACTITIONER

## 2023-07-26 PROCEDURE — 1159F PR MEDICATION LIST DOCUMENTED IN MEDICAL RECORD: ICD-10-PCS | Mod: CPTII,,, | Performed by: NURSE PRACTITIONER

## 2023-07-26 PROCEDURE — 99213 PR OFFICE/OUTPT VISIT, EST, LEVL III, 20-29 MIN: ICD-10-PCS | Mod: 25,,, | Performed by: NURSE PRACTITIONER

## 2023-07-26 PROCEDURE — 99213 OFFICE O/P EST LOW 20 MIN: CPT | Mod: 25,,, | Performed by: NURSE PRACTITIONER

## 2023-07-26 PROCEDURE — 3078F DIAST BP <80 MM HG: CPT | Mod: CPTII,,, | Performed by: NURSE PRACTITIONER

## 2023-07-26 RX ORDER — KETOROLAC TROMETHAMINE 30 MG/ML
60 INJECTION, SOLUTION INTRAMUSCULAR; INTRAVENOUS
Status: COMPLETED | OUTPATIENT
Start: 2023-07-26 | End: 2023-07-26

## 2023-07-26 RX ADMIN — KETOROLAC TROMETHAMINE 60 MG: 30 INJECTION, SOLUTION INTRAMUSCULAR; INTRAVENOUS at 08:07

## 2023-07-26 NOTE — PATIENT INSTRUCTIONS
"Patient Education       Diabetes and Diet   The Basics   Written by the doctors and editors at [facility]   Why is diet important in diabetes? -- Diet is important because it is part of diabetes treatment. Many people need to change what they eat and how much they eat to help treat their diabetes. It is important for people to treat their diabetes so that they:  Keep their blood sugar at or near a normal level  Prevent long-term problems, such as heart or kidney problems, that can happen in people with diabetes  Changing your diet can also help treat obesity, high blood pressure, and high cholesterol. These conditions can affect people with diabetes and can lead to future problems, such as heart attacks or strokes.  Who will work with me to change my diet? -- Your doctor or nurse will work with you to make a food plan to change your diet. They might also recommend that you work with a "dietitian." A dietitian is an expert on food and eating.  Do I need to eat at the same times every day? -- When and how often you should eat depends, in part, on the diabetes medicines you take. For example:  People who take about the same amount of insulin at the same time each day (called a "fixed regimen") should eat meals at the same times. This is also true for people who take pills that increase insulin levels, such as sulfonylureas. Eating meals at the same time every day helps prevent low blood sugar.  People who adjust the dose and timing of their insulin each day (called a "flexible regimen") do not always have to eat meals at the same time. That's because they can time their insulin dose for before they plan to eat, and also adjust the dose for how much they plan to eat.  People who take medicines that don't usually cause low blood sugar, such as metformin, don't have to eat meals at the same time every day.  What do I need to think about when planning what to eat? -- Our bodies break down the food we eat into small pieces " "called carbohydrates, proteins, and fats.  When planning what to eat, people with diabetes need to think about:  Carbohydrates (or "carbs") - Carbohydrates, which are sugars that our bodies use for energy, can raise a person's blood sugar level. Your doctor, nurse, or dietitian will tell you how many carbohydrates you should eat at each meal or snack. Foods that have carbohydrates include:  Bread, pasta, and rice  Vegetables and fruits  Dairy foods  Foods and drinks with added sugar  It is best to get your carbohydrates from fruits, vegetables, whole grains, and low-fat milk. It is best to avoid drinks with added sugar, like soda, juices, and sports drinks.   Protein - Your doctor, nurse, or dietitian will tell you how much protein you should eat each day. It is best to eat lean meats, fish, eggs, beans, peas, soy products, nuts, and seeds.  Fats - The type of fat you eat is more important than the amount of fat. "Saturated" and "trans" fats can increase your risk for heart problems, like a heart attack.  Foods that have saturated fats include meat, butter, cheese, and ice cream.  Foods that have trans fats include processed food with "partially hydrogenated oils" on the ingredient list. This may include fried foods, store bought cookies, muffins, pies, and cakes.  "Monounsaturated" and "polyunsaturated" fats are better for you. Foods with these types of fat include fish, avocado, olive oil, and nuts.  Calories - People need to eat a certain amount of calories each day to keep their weight the same. People who are overweight and want to lose weight need to eat fewer calories each day.  Fiber - Eating foods with a lot of fiber can help control a person's blood sugar level. Foods that have a lot of fiber include apples, green beans, peas, beans, lentils, nuts, oatmeal, and whole grains.  Salt - People who have high blood pressure should not eat foods that contain a lot of salt (also called sodium). People with high " blood pressure should also eat healthy foods, such as fruits, vegetables, and low-fat dairy foods.  Alcohol - Having more than 1 drink (for women) or 2 drinks (for men) a day can raise blood sugar levels. Also, drinks that have fruit juice or soda in them can raise blood sugar levels.  What can I do if I need to lose weight? -- If you need to lose weight, you can:  Exercise - Try to get at least 30 minutes of physical activity a day, most days of the week. Even gentle exercise, like walking, is good for your health. Some people with diabetes need to change their medicine dose before they exercise. They might also need to check their blood sugar levels before and after exercising.  Eat fewer calories - Your doctor, nurse, or dietitian can tell you how many calories you should eat each day in order to lose weight.  If you are worried about your weight, size, or shape, talk with your doctor, nurse, or dietitian. They can help you make changes to improve your health.  Can I eat the same foods as my family? -- Yes. You do not need to eat special foods if you have diabetes. You and your family can eat the same foods. Changing your diet is mostly about eating healthy foods and not eating too much.  What are the other parts of diabetes treatment? -- Besides changing your diet, the other parts of diabetes treatment are:  Exercise  Medicines  Some people with diabetes need to learn how to match their diet and exercise with their medicine dose. For example, people who use insulin might need to choose the dose of insulin they give themselves. To choose their dose, they need to think about:  What they plan to eat at the next meal  How much exercise they plan to do  What their blood sugar level is  If the diet and exercise do not match the medicine dose, a person's blood sugar level can get too low or too high. Blood sugar levels that are too low or too high can cause problems.  All topics are updated as new evidence becomes  available and our peer review process is complete.  This topic retrieved from [employer] on: Sep 21, 2021.  Topic 06703 Version 7.0  Release: 29.4.2 - C29.263  © 2021 UpToDate, Inc. and/or its affiliates. All rights reserved.  Consumer Information Use and Disclaimer   This information is not specific medical advice and does not replace information you receive from your health care provider. This is only a brief summary of general information. It does NOT include all information about conditions, illnesses, injuries, tests, procedures, treatments, therapies, discharge instructions or life-style choices that may apply to you. You must talk with your health care provider for complete information about your health and treatment options. This information should not be used to decide whether or not to accept your health care provider's advice, instructions or recommendations. Only your health care provider has the knowledge and training to provide advice that is right for you. The use of this information is governed by the [employer] End User License Agreement, available at https://[email or web address].[email or web address]/solutions/[employer]/about/dhruv.The use of [employer] content is governed by the [employer] Terms of Use. ©2021 UpToDate, Inc. All rights reserved.  Copyright   © 2021 UpToDate, Inc. and/or its affiliates. All rights reserved.  Patient Education       Controlling Your Blood Pressure Through Lifestyle   The Basics   Written by the doctors and editors at [employer]   What does my lifestyle have to do with my blood pressure? -- The things you do and the foods you eat have a big effect on your blood pressure and your overall health. Following the right lifestyle can:  Lower your blood pressure or keep you from getting high blood pressure in the first place  Reduce your need for blood pressure medicines  Make medicines for high blood pressure work better, if you do take them  Lower the chances that you'll have a heart attack or stroke,  "or develop kidney disease  Which lifestyle choices will help lower my blood pressure? -- Here's what you can do:  Lose weight (if you are overweight)  Choose a diet rich in fruits, vegetables, and low-fat dairy products, and low in meats, sweets, and refined grains  Eat less salt (sodium)  Do something active for at least 30 minutes a day on most days of the week  Limit the amount of alcohol you drink  If you have high blood pressure, it's also very important to quit smoking (if you smoke). Quitting smoking might not bring your blood pressure down. But it will lower the chances that you'll have a heart attack or stroke, and it will help you feel better and live longer.  Start low and go slow -- The changes listed above might sound like a lot, but don't worry. You don't have to change everything all at once. The key to improving your lifestyle is to "start low and go slow." Choose 1 small, specific thing to change and try doing it for a while. If it works for you, keep doing it until it becomes a habit. If it doesn't, don't give up. Choose something else to change and see how that goes.  Let's say, for example, that you would like to improve your diet. If you're the type of person who eats cheeseburgers and French fries all the time, you can't switch to eating just salads from one day to the next. When people try to make changes like that, they often fail. Then they feel frustrated and tend to give up. So instead of trying to change everything about your diet in 1 day, change 1 or 2 small things about your diet and give yourself time to get used to those changes. For instance, keep the cheeseburger but give up the French fries. Or eat the same things but cut your portions in half.  As you find things that you are able to change and stick with, keep adding new changes. In time, you will see that you can actually change a lot. You just have to get used to the changes slowly.  Lose weight -- When people think about " "losing weight, they sometimes make it more complicated than it really is. To lose weight, you have to either eat less or move more. If you do both of those things, it's even better. But there is no single weight-loss diet or activity that's better than any other. When it comes to weight loss, the most effective plan is the one that you'll stick with.  Improve your diet -- There is no single diet that is right for everyone. But in general, a healthy diet can include:  Lots of fruits, vegetables, and whole grains  Some beans, peas, lentils, chickpeas, and similar foods  Some nuts, such as walnuts, almonds, and peanuts  Fat-free or low-fat milk and milk products  Some fish  To have a healthy diet, it's also important to limit or avoid sugar, sweets, meats, and refined grains. (Refined grains are found in white bread, white rice, most forms of pasta, and most packaged "snack" foods.)  Reduce salt -- Many people think that eating a low-sodium diet means avoiding the salt shaker and not adding salt when cooking. The truth is, not adding salt at the table or when you cook will only help a little. Almost all of the sodium you eat is already in the food you buy at the grocery store or at restaurants (figure 1).  The most important thing you can do to cut down on sodium is to eat less processed food. That means that you should avoid most foods that are sold in cans, boxes, jars, and bags. You should also eat in restaurants less often.  To reduce the amount of sodium you get, buy fresh or fresh-frozen fruits, vegetables, and meats. (Fresh-frozen foods have had nothing added to them before freezing.) Then you can make meals at home, from scratch, with these ingredients.  As with the other changes, don't try to cut out salt all at once. Instead, choose 1 or 2 foods that have a lot of sodium and try to replace them with low-sodium choices. When you get used to those low-sodium options, find another food or 2 to change. Then keep " "going, until all the foods you eat are sodium-free or low in sodium.  Become more active -- If you want to be more active, you don't have to go to the gym or get all sweaty. It is possible to increase your activity level while doing everyday things you enjoy. Walking, gardening, and dancing are just a few of the things that you might try. As with all the other changes, the key is not to do too much too fast. If you don't do any activity now, start by walking for just a few minutes every other day. Do that for a few weeks. If you stick with it, try doing it for longer. But if you find that you don't like walking, try a different activity.  Drink less alcohol -- If you are a woman, do not have more than 1 "standard drink" of alcohol a day. If you are a man, do not have more than 2. A "standard drink" is:  A can or bottle that has 12 ounces of beer  A glass that has 5 ounces of wine  A shot that has 1.5 ounces of whiskey  Where should I start? -- If you want to improve your lifestyle, start by making the changes that you think would be easiest for you. If you used to exercise and just got out of the habit, maybe it would be easy for you to start exercising again. Or if you actually like cooking meals from scratch, maybe the first thing you should focus on is eating home-cooked meals that are low in sodium.  Whatever you tackle first, choose specific, realistic goals, and give yourself a deadline. For example, do not decide that you are going to "exercise more." Instead, decide that you are going to walk for 10 minutes on Monday, Wednesday, and Friday, and that you are going to do this for the next 2 weeks.  When lifestyle changes are too general, people have a hard time following through.  Now go. You can do it!  All topics are updated as new evidence becomes available and our peer review process is complete.  This topic retrieved from [employer] on: Sep 21, 2021.  Topic 41859 Version 8.0  Release: 29.4.2 - C29.263  © " 2021 UpToDate, Inc. and/or its affiliates. All rights reserved.  figure 1: Sources of sodium in your diet     Graphic 03607 Version 2.0    Consumer Information Use and Disclaimer   This information is not specific medical advice and does not replace information you receive from your health care provider. This is only a brief summary of general information. It does NOT include all information about conditions, illnesses, injuries, tests, procedures, treatments, therapies, discharge instructions or life-style choices that may apply to you. You must talk with your health care provider for complete information about your health and treatment options. This information should not be used to decide whether or not to accept your health care provider's advice, instructions or recommendations. Only your health care provider has the knowledge and training to provide advice that is right for you. The use of this information is governed by the "[employer] End User License Agreement, available at [email or web address] use of [employer] content is governed by the [employer] Terms of Use. ©2021 UpToDate, Inc. All rights reserved.  Copyright   © 2021 UpToDate, Inc. and/or its affiliates. All rights reserved.

## 2023-07-26 NOTE — PROGRESS NOTES
[facility]  Primary Care       PATIENT NAME: Natali Claire   : 1970    AGE: 52 y.o. DATE: 2023    MRN: [id number]        Reason for Visit / Chief Complaint:  Hip Pain (Right hip and leg pain 5 days ) and Diabetes (Blood sugar 131 )     Subjective:     HPI: Patient complains of pain to right lower back and buttock that radiates down right leg. Denies any falls or injuries.     Hip Pain     Diabetes  Pertinent negatives for hypoglycemia include no headaches. Pertinent negatives for diabetes include no chest pain and no fatigue.        Review of Systems: Review of Systems   Constitutional:  Negative for chills, fatigue and fever.   Respiratory:  Negative for cough, chest tightness and shortness of breath.    Cardiovascular:  Negative for chest pain.   Gastrointestinal:  Negative for abdominal pain, constipation, diarrhea, nausea and vomiting.   Genitourinary:  Negative for dysuria.   Musculoskeletal:  Positive for back pain. Negative for gait problem.   Skin:  Negative for rash.   Neurological:  Negative for headaches.        Review of patient's allergies indicates:  No Known Allergies     Med List:  Current Outpatient Medications on File Prior to Visit   Medication Sig Dispense Refill    atorvastatin (LIPITOR) 10 MG tablet Take 1 tablet (10 mg total) by mouth once daily. 90 tablet 3    ciclopirox (PENLAC) 8 % Soln 1 application to affected area      cyclobenzaprine (FLEXERIL) 10 MG tablet Take 1 tablet (10 mg total) by mouth 3 (three) times daily as needed for Muscle spasms. 90 tablet 2    diclofenac sodium (VOLTAREN) 1 % Gel Apply 2 g topically 4 (four) times daily. 100 g 5    ENBREL SURECLICK 50 mg/mL (1 mL) PnIj 50 mg every 7 days.      esomeprazole (NEXIUM) 40 MG capsule Take 1 capsule (40 mg total) by mouth before breakfast. 90 capsule 3    esomeprazole (NEXIUM) 40 mg injection as directed Intravenous      ferrous sulfate 325 (65 FE) MG EC tablet Take 1 tablet (325 mg total) by mouth  once daily. 90 tablet 3    folic acid (FOLVITE) 1 MG tablet Take 1 tablet (1 mg total) by mouth once daily. 90 tablet 3    glimepiride (AMARYL) 4 MG tablet Take 1 tablet (4 mg total) by mouth before breakfast. 90 tablet 3    hydroCHLOROthiazide (HYDRODIURIL) 25 MG tablet Take 1 tablet (25 mg total) by mouth every morning. 90 tablet 3    HYDROcodone-acetaminophen (NORCO)  mg per tablet Take 1 tablet by mouth every 12 (twelve) hours as needed for Pain. 60 tablet 0    loratadine (CLARITIN) 10 mg tablet Take 1 tablet (10 mg total) by mouth once daily. 90 tablet 3    metFORMIN (GLUCOPHAGE) 500 MG tablet Take 1 tablet (500 mg total) by mouth 2 (two) times daily with meals. 180 tablet 3    methotrexate 2.5 MG Tab 2.5 mg. Patient takes 10 tablet once a week      metoprolol tartrate (LOPRESSOR) 50 MG tablet TAKE 1 TABLET TWICE DAILY 180 tablet 1    montelukast (SINGULAIR) 10 mg tablet TAKE 1 TABLET (10 MG TOTAL) BY MOUTH EVERY EVENING. 30 tablet 5    nabumetone (RELAFEN) 750 MG tablet Take 1 tablet (750 mg total) by mouth 2 (two) times daily. 60 tablet 5    omeprazole (PRILOSEC) 20 MG capsule 1 capsule 30 minutes before morning meal Strength: 20 mg (Patient not taking: Reported on 6/7/2023) 30 capsule 3    penicillin v potassium (VEETID) 500 MG tablet Take 1 tablet (500 mg total) by mouth every 6 (six) hours. 40 tablet 0    potassium chloride SA (K-DUR,KLOR-CON) 20 MEQ tablet Take 1 tablet (20 mEq total) by mouth 2 (two) times daily. 180 tablet 3    tiZANidine (ZANAFLEX) 4 MG tablet TAKE 1 TABLET BY MOUTH EVERY 8 HOURS IF NEEDED FOR MUSCLE SPASMS      traMADoL (ULTRAM) 50 mg tablet TAKE 1 TABLET BY MOUTH 2 TIMES DAILY IF NEEDED FOR PAIN.       No current facility-administered medications on file prior to visit.       Medical/Social/Family History:  Past Medical History:   Diagnosis Date    Anxiety     Arthritis     Chronic low back pain     Chronic pain syndrome     Degenerative joint disease involving multiple joints   "   Diabetes mellitus, type 2     GERD (gastroesophageal reflux disease)     Hyperlipidemia     Hypertension     Lumbar radiculopathy     Lumbosacral spondylosis     Multiple joint pain     Onychomycosis     Osteoarthritis     Other long term (current) drug therapy     Rheumatoid arthritis       Social History     Tobacco Use   Smoking Status Never    Passive exposure: Never   Smokeless Tobacco Never      Social History     Substance and Sexual Activity   Alcohol Use Not Currently       Family History   Problem Relation Age of Onset    Diabetes Mother     Heart disease Father     Diabetes Sister     Breast cancer Paternal Cousin       Past Surgical History:   Procedure Laterality Date    BREAST SURGERY      CHOLECYSTECTOMY      TUBAL LIGATION        Immunization History   Administered Date(s) Administered    COVID-19, MRNA, LN-S, PF (MODERNA FULL 0.5 ML DOSE) 03/24/2021, 04/21/2021, 11/07/2022    DTP 1970, 01/14/1970, 04/15/1970, 08/12/1971, 08/28/1974    IPV 1970, 01/14/1970, 08/12/1971, 08/28/1974, 08/23/1978    Influenza - Quadrivalent - PF *Preferred* (6 months and older) 12/01/2021    MMR 01/17/1971    Measles 08/21/1971    Pneumococcal Conjugate - 13 Valent 11/15/2017    Rubella 01/17/1971          Objective:      Vitals:    07/26/23 0808   BP: 134/73   BP Location: Left arm   Patient Position: Sitting   BP Method: Large (Automatic)   Pulse: 70   Resp: 18   Temp: 98.2 °F (36.8 °C)   TempSrc: Oral   SpO2: 99%   Weight: 133.8 kg (295 lb)   Height: 5' 9" (1.753 m)     Body mass index is 43.56 kg/m².     Physical Exam: Physical Exam  Vitals and nursing note reviewed.   Constitutional:       General: She is not in acute distress.     Appearance: Normal appearance. She is obese. She is not ill-appearing, toxic-appearing or diaphoretic.   HENT:      Head: Normocephalic.      Mouth/Throat:      Mouth: Mucous membranes are moist.   Eyes:      Pupils: Pupils are equal, round, and reactive to light. "   Cardiovascular:      Rate and Rhythm: Normal rate and regular rhythm.      Heart sounds: Normal heart sounds.   Pulmonary:      Effort: Pulmonary effort is normal.      Breath sounds: Normal breath sounds. No wheezing.   Musculoskeletal:         General: Tenderness (patient has tenderness to right lower back/buttock) present. Normal range of motion.      Cervical back: Normal range of motion and neck supple.   Skin:     General: Skin is warm and dry.   Neurological:      General: No focal deficit present.      Mental Status: She is alert and oriented to person, place, and time.      Gait: Gait normal.   Psychiatric:         Mood and Affect: Mood normal.         Behavior: Behavior normal.              Assessment:          ICD-10-CM ICD-9-CM   1. Chronic right-sided low back pain with right-sided sciatica  M54.41 724.2    G89.29 724.3     338.29   2. Type 2 diabetes mellitus without complication, without long-term current use of insulin  E11.9 250.00   3. Primary hypertension  I10 401.9        Plan:       Chronic right-sided low back pain with right-sided sciatica  -     ketorolac injection 60 mg    Type 2 diabetes mellitus without complication, without long-term current use of insulin  -     POCT glucose    Primary hypertension      Component      Latest Ref Rng & Units 7/26/2023   POC Glucose      70 - 110 MG/ (A)       New & refilled meds:  Requested Prescriptions      No prescriptions requested or ordered in this encounter       Follow up if symptoms worsen or fail to improve.     Patient Instructions   Patient Education       Diabetes and Diet   The Basics   Written by the doctors and editors at UpToDate   Why is diet important in diabetes? -- Diet is important because it is part of diabetes treatment. Many people need to change what they eat and how much they eat to help treat their diabetes. It is important for people to treat their diabetes so that they:  Keep their blood sugar at or near a normal  "level  Prevent long-term problems, such as heart or kidney problems, that can happen in people with diabetes  Changing your diet can also help treat obesity, high blood pressure, and high cholesterol. These conditions can affect people with diabetes and can lead to future problems, such as heart attacks or strokes.  Who will work with me to change my diet? -- Your doctor or nurse will work with you to make a food plan to change your diet. They might also recommend that you work with a "dietitian." A dietitian is an expert on food and eating.  Do I need to eat at the same times every day? -- When and how often you should eat depends, in part, on the diabetes medicines you take. For example:  People who take about the same amount of insulin at the same time each day (called a "fixed regimen") should eat meals at the same times. This is also true for people who take pills that increase insulin levels, such as sulfonylureas. Eating meals at the same time every day helps prevent low blood sugar.  People who adjust the dose and timing of their insulin each day (called a "flexible regimen") do not always have to eat meals at the same time. That's because they can time their insulin dose for before they plan to eat, and also adjust the dose for how much they plan to eat.  People who take medicines that don't usually cause low blood sugar, such as metformin, don't have to eat meals at the same time every day.  What do I need to think about when planning what to eat? -- Our bodies break down the food we eat into small pieces called carbohydrates, proteins, and fats.  When planning what to eat, people with diabetes need to think about:  Carbohydrates (or "carbs") - Carbohydrates, which are sugars that our bodies use for energy, can raise a person's blood sugar level. Your doctor, nurse, or dietitian will tell you how many carbohydrates you should eat at each meal or snack. Foods that have carbohydrates include:  Bread, pasta, " "and rice  Vegetables and fruits  Dairy foods  Foods and drinks with added sugar  It is best to get your carbohydrates from fruits, vegetables, whole grains, and low-fat milk. It is best to avoid drinks with added sugar, like soda, juices, and sports drinks.   Protein - Your doctor, nurse, or dietitian will tell you how much protein you should eat each day. It is best to eat lean meats, fish, eggs, beans, peas, soy products, nuts, and seeds.  Fats - The type of fat you eat is more important than the amount of fat. "Saturated" and "trans" fats can increase your risk for heart problems, like a heart attack.  Foods that have saturated fats include meat, butter, cheese, and ice cream.  Foods that have trans fats include processed food with "partially hydrogenated oils" on the ingredient list. This may include fried foods, store bought cookies, muffins, pies, and cakes.  "Monounsaturated" and "polyunsaturated" fats are better for you. Foods with these types of fat include fish, avocado, olive oil, and nuts.  Calories - People need to eat a certain amount of calories each day to keep their weight the same. People who are overweight and want to lose weight need to eat fewer calories each day.  Fiber - Eating foods with a lot of fiber can help control a person's blood sugar level. Foods that have a lot of fiber include apples, green beans, peas, beans, lentils, nuts, oatmeal, and whole grains.  Salt - People who have high blood pressure should not eat foods that contain a lot of salt (also called sodium). People with high blood pressure should also eat healthy foods, such as fruits, vegetables, and low-fat dairy foods.  Alcohol - Having more than 1 drink (for women) or 2 drinks (for men) a day can raise blood sugar levels. Also, drinks that have fruit juice or soda in them can raise blood sugar levels.  What can I do if I need to lose weight? -- If you need to lose weight, you can:  Exercise - Try to get at least 30 minutes " of physical activity a day, most days of the week. Even gentle exercise, like walking, is good for your health. Some people with diabetes need to change their medicine dose before they exercise. They might also need to check their blood sugar levels before and after exercising.  Eat fewer calories - Your doctor, nurse, or dietitian can tell you how many calories you should eat each day in order to lose weight.  If you are worried about your weight, size, or shape, talk with your doctor, nurse, or dietitian. They can help you make changes to improve your health.  Can I eat the same foods as my family? -- Yes. You do not need to eat special foods if you have diabetes. You and your family can eat the same foods. Changing your diet is mostly about eating healthy foods and not eating too much.  What are the other parts of diabetes treatment? -- Besides changing your diet, the other parts of diabetes treatment are:  Exercise  Medicines  Some people with diabetes need to learn how to match their diet and exercise with their medicine dose. For example, people who use insulin might need to choose the dose of insulin they give themselves. To choose their dose, they need to think about:  What they plan to eat at the next meal  How much exercise they plan to do  What their blood sugar level is  If the diet and exercise do not match the medicine dose, a person's blood sugar level can get too low or too high. Blood sugar levels that are too low or too high can cause problems.  All topics are updated as new evidence becomes available and our peer review process is complete.  This topic retrieved from [employer] on: Sep 21, 2021.  Topic 63279 Version 7.0  Release: 29.4.2 - C29.263  © 2021 UpToDate, Inc. and/or its affiliates. All rights reserved.  Consumer Information Use and Disclaimer   This information is not specific medical advice and does not replace information you receive from your health care provider. This is only a brief  summary of general information. It does NOT include all information about conditions, illnesses, injuries, tests, procedures, treatments, therapies, discharge instructions or life-style choices that may apply to you. You must talk with your health care provider for complete information about your health and treatment options. This information should not be used to decide whether or not to accept your health care provider's advice, instructions or recommendations. Only your health care provider has the knowledge and training to provide advice that is right for you. The use of this information is governed by the [employer] End User License Agreement, available at https://www.[employer]/en/solutions/Scout/about/dhruv.The use of [employer] content is governed by the [employer] Terms of Use. ©2021 UpToDate, Inc. All rights reserved.  Copyright   © 2021 UpToDate, Inc. and/or its affiliates. All rights reserved.  Patient Education       Controlling Your Blood Pressure Through Lifestyle   The Basics   Written by the doctors and editors at [facility]   What does my lifestyle have to do with my blood pressure? -- The things you do and the foods you eat have a big effect on your blood pressure and your overall health. Following the right lifestyle can:  Lower your blood pressure or keep you from getting high blood pressure in the first place  Reduce your need for blood pressure medicines  Make medicines for high blood pressure work better, if you do take them  Lower the chances that you'll have a heart attack or stroke, or develop kidney disease  Which lifestyle choices will help lower my blood pressure? -- Here's what you can do:  Lose weight (if you are overweight)  Choose a diet rich in fruits, vegetables, and low-fat dairy products, and low in meats, sweets, and refined grains  Eat less salt (sodium)  Do something active for at least 30 minutes a day on most days of the week  Limit the amount of alcohol you drink  If you  "have high blood pressure, it's also very important to quit smoking (if you smoke). Quitting smoking might not bring your blood pressure down. But it will lower the chances that you'll have a heart attack or stroke, and it will help you feel better and live longer.  Start low and go slow -- The changes listed above might sound like a lot, but don't worry. You don't have to change everything all at once. The key to improving your lifestyle is to "start low and go slow." Choose 1 small, specific thing to change and try doing it for a while. If it works for you, keep doing it until it becomes a habit. If it doesn't, don't give up. Choose something else to change and see how that goes.  Let's say, for example, that you would like to improve your diet. If you're the type of person who eats cheeseburgers and French fries all the time, you can't switch to eating just salads from one day to the next. When people try to make changes like that, they often fail. Then they feel frustrated and tend to give up. So instead of trying to change everything about your diet in 1 day, change 1 or 2 small things about your diet and give yourself time to get used to those changes. For instance, keep the cheeseburger but give up the French fries. Or eat the same things but cut your portions in half.  As you find things that you are able to change and stick with, keep adding new changes. In time, you will see that you can actually change a lot. You just have to get used to the changes slowly.  Lose weight -- When people think about losing weight, they sometimes make it more complicated than it really is. To lose weight, you have to either eat less or move more. If you do both of those things, it's even better. But there is no single weight-loss diet or activity that's better than any other. When it comes to weight loss, the most effective plan is the one that you'll stick with.  Improve your diet -- There is no single diet that is right for " "everyone. But in general, a healthy diet can include:  Lots of fruits, vegetables, and whole grains  Some beans, peas, lentils, chickpeas, and similar foods  Some nuts, such as walnuts, almonds, and peanuts  Fat-free or low-fat milk and milk products  Some fish  To have a healthy diet, it's also important to limit or avoid sugar, sweets, meats, and refined grains. (Refined grains are found in white bread, white rice, most forms of pasta, and most packaged "snack" foods.)  Reduce salt -- Many people think that eating a low-sodium diet means avoiding the salt shaker and not adding salt when cooking. The truth is, not adding salt at the table or when you cook will only help a little. Almost all of the sodium you eat is already in the food you buy at the grocery store or at restaurants (figure 1).  The most important thing you can do to cut down on sodium is to eat less processed food. That means that you should avoid most foods that are sold in cans, boxes, jars, and bags. You should also eat in restaurants less often.  To reduce the amount of sodium you get, buy fresh or fresh-frozen fruits, vegetables, and meats. (Fresh-frozen foods have had nothing added to them before freezing.) Then you can make meals at home, from scratch, with these ingredients.  As with the other changes, don't try to cut out salt all at once. Instead, choose 1 or 2 foods that have a lot of sodium and try to replace them with low-sodium choices. When you get used to those low-sodium options, find another food or 2 to change. Then keep going, until all the foods you eat are sodium-free or low in sodium.  Become more active -- If you want to be more active, you don't have to go to the gym or get all sweaty. It is possible to increase your activity level while doing everyday things you enjoy. Walking, gardening, and dancing are just a few of the things that you might try. As with all the other changes, the key is not to do too much too fast. If " "you don't do any activity now, start by walking for just a few minutes every other day. Do that for a few weeks. If you stick with it, try doing it for longer. But if you find that you don't like walking, try a different activity.  Drink less alcohol -- If you are a woman, do not have more than 1 "standard drink" of alcohol a day. If you are a man, do not have more than 2. A "standard drink" is:  A can or bottle that has 12 ounces of beer  A glass that has 5 ounces of wine  A shot that has 1.5 ounces of whiskey  Where should I start? -- If you want to improve your lifestyle, start by making the changes that you think would be easiest for you. If you used to exercise and just got out of the habit, maybe it would be easy for you to start exercising again. Or if you actually like cooking meals from scratch, maybe the first thing you should focus on is eating home-cooked meals that are low in sodium.  Whatever you tackle first, choose specific, realistic goals, and give yourself a deadline. For example, do not decide that you are going to "exercise more." Instead, decide that you are going to walk for 10 minutes on Monday, Wednesday, and Friday, and that you are going to do this for the next 2 weeks.  When lifestyle changes are too general, people have a hard time following through.  Now go. You can do it!  All topics are updated as new evidence becomes available and our peer review process is complete.  This topic retrieved from [employer] on: Sep 21, 2021.  Topic 64246 Version 8.0  Release: 29.4.2 - C29.263  © 2021 UpToDate, Inc. and/or its affiliates. All rights reserved.  figure 1: Sources of sodium in your diet     Graphic 94353 Version 2.0    Consumer Information Use and Disclaimer   This information is not specific medical advice and does not replace information you receive from your health care provider. This is only a brief summary of general information. It does NOT include all information about conditions, " illnesses, injuries, tests, procedures, treatments, therapies, discharge instructions or life-style choices that may apply to you. You must talk with your health care provider for complete information about your health and treatment options. This information should not be used to decide whether or not to accept your health care provider's advice, instructions or recommendations. Only your health care provider has the knowledge and training to provide advice that is right for you. The use of this information is governed by the [employer] End User License Agreement, available at https://www.[employer]/en/solutions/HBCS/about/dhruv.The use of [employer] content is governed by the [employer] Terms of Use. ©2021 [employer] All rights reserved.  Copyright   © 2021 UpToDate, Inc. and/or its affiliates. All rights reserved.           Signature: Griselda Rogers DNP, FNP-C

## 2023-07-28 DIAGNOSIS — M05.79 RHEUMATOID ARTHRITIS INVOLVING MULTIPLE SITES WITH POSITIVE RHEUMATOID FACTOR: ICD-10-CM

## 2023-07-31 ENCOUNTER — EXTERNAL CHRONIC CARE MANAGEMENT (OUTPATIENT)
Dept: PRIMARY CARE CLINIC | Facility: CLINIC | Age: 53
End: 2023-07-31
Payer: MEDICARE

## 2023-07-31 PROCEDURE — G0511 CCM/BHI BY RHC/FQHC 20MIN MO: HCPCS | Mod: ,,, | Performed by: FAMILY MEDICINE

## 2023-07-31 PROCEDURE — G0511 PR CHRONIC CARE MGMT, RHC OR FQHC ONLY, 20 MINS OR MORE: ICD-10-PCS | Mod: ,,, | Performed by: FAMILY MEDICINE

## 2023-08-01 ENCOUNTER — OFFICE VISIT (OUTPATIENT)
Dept: PAIN MEDICINE | Facility: CLINIC | Age: 53
End: 2023-08-01
Payer: COMMERCIAL

## 2023-08-01 VITALS
HEIGHT: 69 IN | RESPIRATION RATE: 18 BRPM | SYSTOLIC BLOOD PRESSURE: 152 MMHG | DIASTOLIC BLOOD PRESSURE: 76 MMHG | BODY MASS INDEX: 43.4 KG/M2 | HEART RATE: 69 BPM | WEIGHT: 293 LBS

## 2023-08-01 DIAGNOSIS — M06.09 RHEUMATOID ARTHRITIS OF MULTIPLE SITES WITHOUT RHEUMATOID FACTOR: Primary | Chronic | ICD-10-CM

## 2023-08-01 DIAGNOSIS — M54.12 CERVICAL RADICULOPATHY: Chronic | ICD-10-CM

## 2023-08-01 DIAGNOSIS — Z79.899 ENCOUNTER FOR LONG-TERM (CURRENT) USE OF OTHER MEDICATIONS: ICD-10-CM

## 2023-08-01 LAB
CTP QC/QA: YES
POC (AMP) AMPHETAMINE: NEGATIVE
POC (BAR) BARBITURATES: NEGATIVE
POC (BUP) BUPRENORPHINE: NEGATIVE
POC (BZO) BENZODIAZEPINES: NEGATIVE
POC (COC) COCAINE: NEGATIVE
POC (MDMA) METHYLENEDIOXYMETHAMPHETAMINE 3,4: ABNORMAL
POC (MET) METHAMPHETAMINE: NEGATIVE
POC (MOP) OPIATES: ABNORMAL
POC (MTD) METHADONE: NEGATIVE
POC (OXY) OXYCODONE: NEGATIVE
POC (PCP) PHENCYCLIDINE: ABNORMAL
POC (TCA) TRICYCLIC ANTIDEPRESSANTS: NEGATIVE
POC TEMPERATURE (URINE): 92
POC THC: NEGATIVE

## 2023-08-01 PROCEDURE — 3051F HG A1C>EQUAL 7.0%<8.0%: CPT | Mod: CPTII,,, | Performed by: PHYSICIAN ASSISTANT

## 2023-08-01 PROCEDURE — 3078F PR MOST RECENT DIASTOLIC BLOOD PRESSURE < 80 MM HG: ICD-10-PCS | Mod: CPTII,,, | Performed by: PHYSICIAN ASSISTANT

## 2023-08-01 PROCEDURE — 3077F SYST BP >= 140 MM HG: CPT | Mod: CPTII,,, | Performed by: PHYSICIAN ASSISTANT

## 2023-08-01 PROCEDURE — 99214 OFFICE O/P EST MOD 30 MIN: CPT | Mod: S$PBB,,, | Performed by: PHYSICIAN ASSISTANT

## 2023-08-01 PROCEDURE — 3078F DIAST BP <80 MM HG: CPT | Mod: CPTII,,, | Performed by: PHYSICIAN ASSISTANT

## 2023-08-01 PROCEDURE — 99215 OFFICE O/P EST HI 40 MIN: CPT | Mod: PBBFAC | Performed by: PHYSICIAN ASSISTANT

## 2023-08-01 PROCEDURE — 80305 DRUG TEST PRSMV DIR OPT OBS: CPT | Mod: PBBFAC | Performed by: PHYSICIAN ASSISTANT

## 2023-08-01 PROCEDURE — 99214 PR OFFICE/OUTPT VISIT, EST, LEVL IV, 30-39 MIN: ICD-10-PCS | Mod: S$PBB,,, | Performed by: PHYSICIAN ASSISTANT

## 2023-08-01 PROCEDURE — 3008F PR BODY MASS INDEX (BMI) DOCUMENTED: ICD-10-PCS | Mod: CPTII,,, | Performed by: PHYSICIAN ASSISTANT

## 2023-08-01 PROCEDURE — 1159F PR MEDICATION LIST DOCUMENTED IN MEDICAL RECORD: ICD-10-PCS | Mod: CPTII,,, | Performed by: PHYSICIAN ASSISTANT

## 2023-08-01 PROCEDURE — 1159F MED LIST DOCD IN RCRD: CPT | Mod: CPTII,,, | Performed by: PHYSICIAN ASSISTANT

## 2023-08-01 PROCEDURE — 3051F PR MOST RECENT HEMOGLOBIN A1C LEVEL 7.0 - < 8.0%: ICD-10-PCS | Mod: CPTII,,, | Performed by: PHYSICIAN ASSISTANT

## 2023-08-01 PROCEDURE — 3077F PR MOST RECENT SYSTOLIC BLOOD PRESSURE >= 140 MM HG: ICD-10-PCS | Mod: CPTII,,, | Performed by: PHYSICIAN ASSISTANT

## 2023-08-01 PROCEDURE — 3008F BODY MASS INDEX DOCD: CPT | Mod: CPTII,,, | Performed by: PHYSICIAN ASSISTANT

## 2023-08-01 RX ORDER — HYDROCODONE BITARTRATE AND ACETAMINOPHEN 10; 325 MG/1; MG/1
1 TABLET ORAL EVERY 12 HOURS PRN
Qty: 60 TABLET | Refills: 0 | Status: SHIPPED | OUTPATIENT
Start: 2023-08-07 | End: 2023-09-06

## 2023-08-01 RX ORDER — HYDROCODONE BITARTRATE AND ACETAMINOPHEN 10; 325 MG/1; MG/1
1 TABLET ORAL EVERY 12 HOURS PRN
Qty: 60 TABLET | Refills: 0 | Status: SHIPPED | OUTPATIENT
Start: 2023-09-06 | End: 2023-10-06

## 2023-08-01 NOTE — PROGRESS NOTES
Subjective:         Patient ID: Natali Claire is a 52 y.o. female.    Chief Complaint: Neck Pain and Shoulder Pain        Pain  This is a chronic problem. The current episode started more than 1 year ago. The problem occurs daily. The problem has been unchanged. Associated symptoms include arthralgias and neck pain. Pertinent negatives include no anorexia, chest pain, chills, coughing, diaphoresis, fever, sore throat, vertigo or vomiting.     Review of Systems   Constitutional:  Negative for activity change, appetite change, chills, diaphoresis, fever and unexpected weight change.   HENT:  Negative for drooling, ear discharge, ear pain, facial swelling, nosebleeds, sore throat, trouble swallowing, voice change and goiter.    Eyes:  Negative for photophobia, pain, discharge, redness and visual disturbance.   Respiratory:  Negative for apnea, cough, choking, chest tightness, shortness of breath, wheezing and stridor.    Cardiovascular:  Negative for chest pain, palpitations and leg swelling.   Gastrointestinal:  Negative for abdominal distention, anorexia, diarrhea, rectal pain, vomiting and fecal incontinence.   Endocrine: Negative for cold intolerance, heat intolerance, polydipsia, polyphagia and polyuria.   Genitourinary:  Negative for bladder incontinence, dysuria, flank pain, frequency and hot flashes.   Musculoskeletal:  Positive for arthralgias, back pain, leg pain and neck pain.   Integumentary:  Negative for color change and pallor.   Allergic/Immunologic: Negative for immunocompromised state.   Neurological:  Negative for dizziness, vertigo, seizures, syncope, facial asymmetry, speech difficulty, light-headedness, coordination difficulties, memory loss and coordination difficulties.   Hematological:  Negative for adenopathy. Does not bruise/bleed easily.   Psychiatric/Behavioral:  Negative for agitation, behavioral problems, confusion, decreased concentration, dysphoric mood, hallucinations, self-injury and  "suicidal ideas. The patient is not nervous/anxious and is not hyperactive.            Past Medical History:   Diagnosis Date    Anxiety     Arthritis     Chronic low back pain     Chronic pain syndrome     Degenerative joint disease involving multiple joints     Diabetes mellitus, type 2     GERD (gastroesophageal reflux disease)     Hyperlipidemia     Hypertension     Lumbar radiculopathy     Lumbosacral spondylosis     Multiple joint pain     Onychomycosis     Osteoarthritis     Other long term (current) drug therapy     Rheumatoid arthritis      Past Surgical History:   Procedure Laterality Date    BREAST SURGERY      CHOLECYSTECTOMY      TUBAL LIGATION       Social History     Socioeconomic History    Marital status: Single    Number of children: 2   Occupational History    Occupation: disable   Tobacco Use    Smoking status: Never     Passive exposure: Never    Smokeless tobacco: Never   Substance and Sexual Activity    Alcohol use: Not Currently    Drug use: Never    Sexual activity: Yes     Partners: Male     Family History   Problem Relation Age of Onset    Diabetes Mother     Heart disease Father     Diabetes Sister     Breast cancer Paternal Cousin      Review of patient's allergies indicates:  No Known Allergies     Objective:  Vitals:    08/01/23 1336   BP: (!) 152/76   Pulse: 69   Resp: 18   Weight: 133.4 kg (294 lb)   Height: 5' 9" (1.753 m)   PainSc:   2           Physical Exam  Vitals and nursing note reviewed. Exam conducted with a chaperone present.   Constitutional:       General: She is awake. She is not in acute distress.     Appearance: Normal appearance. She is not ill-appearing, toxic-appearing or diaphoretic.   HENT:      Head: Normocephalic and atraumatic.      Nose: Nose normal.      Mouth/Throat:      Mouth: Mucous membranes are moist.      Pharynx: Oropharynx is clear.   Eyes:      Conjunctiva/sclera: Conjunctivae normal.      Pupils: Pupils are equal, round, and reactive to light. "   Cardiovascular:      Rate and Rhythm: Normal rate.   Pulmonary:      Effort: Pulmonary effort is normal. No respiratory distress.   Abdominal:      Palpations: Abdomen is soft.   Musculoskeletal:         General: Normal range of motion.      Cervical back: Normal range of motion and neck supple. Tenderness present.      Thoracic back: Tenderness present.      Lumbar back: Tenderness present.   Skin:     General: Skin is warm and dry.   Neurological:      General: No focal deficit present.      Mental Status: She is alert and oriented to person, place, and time. Mental status is at baseline.      Cranial Nerves: No cranial nerve deficit (II-XII).   Psychiatric:         Mood and Affect: Mood normal.         Behavior: Behavior normal. Behavior is cooperative.         Thought Content: Thought content normal.           Mammo Digital Screening Bilat  Narrative: Result:   Mammo Digital Screening Bilat     History:  Patient is 52 y.o. and is seen for a screening mammogram.    Films Compared:  Compared to: 10/26/2021 Mammo Digital Diagnostic Left and 10/05/2021 Mammo   Digital Screening Bilat     Findings:     The breasts have scattered areas of fibroglandular density. There is no   evidence of suspicious masses, calcifications, or other abnormal findings.  Impression: Bilateral  There is no mammographic evidence of malignancy.    BI-RADS Category:   Overall: 2 - Benign       Recommendation:  Routine screening mammogram in 1 year is recommended.    Your estimated lifetime risk of breast cancer (to age 85) based on   Tyrer-Cuzick risk assessment model is Tyrer-Cuzick: 9.57 %. According to   the [employer] Cancer Society, patients with a lifetime breast cancer risk   of 20% or higher might benefit from supplemental screening tests.         Office Visit on 07/26/2023   Component Date Value Ref Range Status    POC Glucose 07/26/2023 131 (A)  70 - 110 MG/DL Final   Office Visit on 06/14/2023   Component Date Value Ref Range  Status    Sodium 06/14/2023 137  136 - 145 mmol/L Final    Potassium 06/14/2023 3.4 (L)  3.5 - 5.1 mmol/L Final    Chloride 06/14/2023 105  98 - 107 mmol/L Final    CO2 06/14/2023 30  21 - 32 mmol/L Final    Anion Gap 06/14/2023 5 (L)  7 - 16 mmol/L Final    Glucose 06/14/2023 139 (H)  74 - 106 mg/dL Final    BUN 06/14/2023 11  7 - 18 mg/dL Final    Creatinine 06/14/2023 1.06 (H)  0.55 - 1.02 mg/dL Final    BUN/Creatinine Ratio 06/14/2023 10  6 - 20 Final    Calcium 06/14/2023 9.0  8.5 - 10.1 mg/dL Final    Total Protein 06/14/2023 7.2  6.4 - 8.2 g/dL Final    Albumin 06/14/2023 3.0 (L)  3.5 - 5.0 g/dL Final    Globulin 06/14/2023 4.2 (H)  2.0 - 4.0 g/dL Final    A/G Ratio 06/14/2023 0.7   Final    Bilirubin, Total 06/14/2023 0.4  >0.0 - 1.2 mg/dL Final    Alk Phos 06/14/2023 110 (H)  41 - 108 U/L Final    ALT 06/14/2023 27  13 - 56 U/L Final    AST 06/14/2023 19  15 - 37 U/L Final    eGFR 06/14/2023 63  >=60 mL/min/1.73m2 Final    Hemoglobin A1C 06/14/2023 7.2 (H)  4.5 - 6.6 % Final    Estimated Average Glucose 06/14/2023 154  mg/dL Final    Triglycerides 06/14/2023 125  35 - 150 mg/dL Final    Cholesterol 06/14/2023 158  0 - 200 mg/dL Final    HDL Cholesterol 06/14/2023 31 (L)  40 - 60 mg/dL Final    Cholesterol/HDL Ratio (Risk Factor) 06/14/2023 5.1   Final    Non-HDL 06/14/2023 127  mg/dL Final    LDL Calculated 06/14/2023 102  mg/dL Final    LDL/HDL 06/14/2023 3.3   Final    VLDL 06/14/2023 25  mg/dL Final    WBC 06/14/2023 5.10  4.50 - 11.00 K/uL Final    RBC 06/14/2023 3.88 (L)  4.20 - 5.40 M/uL Final    Hemoglobin 06/14/2023 11.5 (L)  12.0 - 16.0 g/dL Final    Hematocrit 06/14/2023 35.3 (L)  38.0 - 47.0 % Final    MCV 06/14/2023 91.0  80.0 - 96.0 fL Final    MCH 06/14/2023 29.6  27.0 - 31.0 pg Final    MCHC 06/14/2023 32.6  32.0 - 36.0 g/dL Final    RDW 06/14/2023 15.1 (H)  11.5 - 14.5 % Final    Platelet Count 06/14/2023 343  150 - 400 K/uL Final    MPV 06/14/2023 9.6  9.4 - 12.4 fL Final    Neutrophils %  06/14/2023 46.0 (L)  53.0 - 65.0 % Final    Lymphocytes % 06/14/2023 38.2  27.0 - 41.0 % Final    Monocytes % 06/14/2023 7.3 (H)  2.0 - 6.0 % Final    Eosinophils % 06/14/2023 6.5 (H)  1.0 - 4.0 % Final    Basophils % 06/14/2023 1.4 (H)  0.0 - 1.0 % Final    Immature Granulocytes % 06/14/2023 0.6 (H)  0.0 - 0.4 % Final    nRBC, Auto 06/14/2023 0.0  <=0.0 % Final    Neutrophils, Abs 06/14/2023 2.35  1.80 - 7.70 K/uL Final    Lymphocytes, Absolute 06/14/2023 1.95  1.00 - 4.80 K/uL Final    Monocytes, Absolute 06/14/2023 0.37  0.00 - 0.80 K/uL Final    Eosinophils, Absolute 06/14/2023 0.33  0.00 - 0.50 K/uL Final    Basophils, Absolute 06/14/2023 0.07  0.00 - 0.20 K/uL Final    Immature Granulocytes, Absolute 06/14/2023 0.03  0.00 - 0.04 K/uL Final    nRBC, Absolute 06/14/2023 0.00  <=0.00 x10e3/uL Final    Diff Type 06/14/2023 Auto   Final   Office Visit on 06/07/2023   Component Date Value Ref Range Status    Rapid Strep A Screen 06/07/2023 Negative  Negative Final     Acceptable 06/07/2023 Yes   Final   Office Visit on 04/03/2023   Component Date Value Ref Range Status    POC Amphetamines 04/03/2023 Negative  Negative, Inconclusive Final    POC Barbiturates 04/03/2023 Negative  Negative, Inconclusive Final    POC Benzodiazepines 04/03/2023 Negative  Negative, Inconclusive Final    POC Cocaine 04/03/2023 Negative  Negative, Inconclusive Final    POC THC 04/03/2023 Negative  Negative, Inconclusive Final    POC Methadone 04/03/2023 Negative  Negative, Inconclusive Final    POC Methamphetamine 04/03/2023 Negative  Negative, Inconclusive Final    POC Opiates 04/03/2023 Presumptive Positive (A)  Negative, Inconclusive Final    POC Oxycodone 04/03/2023 Negative  Negative, Inconclusive Final    POC Phencyclidine 04/03/2023 Negative  Negative, Inconclusive Final    POC Methylenedioxymethamphetamine * 04/03/2023 Negative  Negative, Inconclusive Final    POC Tricyclic Antidepressants 04/03/2023 Negative   Negative, Inconclusive Final    POC Buprenorphine 04/03/2023 Negative   Final     Acceptable 04/03/2023 Yes   Final    POC Temperature (Urine) 04/03/2023 90   Final   Office Visit on 02/27/2023   Component Date Value Ref Range Status    Sodium 02/27/2023 139  136 - 145 mmol/L Final    Potassium 02/27/2023 3.4 (L)  3.5 - 5.1 mmol/L Final    Chloride 02/27/2023 105  98 - 107 mmol/L Final    CO2 02/27/2023 30  21 - 32 mmol/L Final    Anion Gap 02/27/2023 7  7 - 16 mmol/L Final    Glucose 02/27/2023 89  74 - 106 mg/dL Final    BUN 02/27/2023 8  7 - 18 mg/dL Final    Creatinine 02/27/2023 1.00  0.55 - 1.02 mg/dL Final    BUN/Creatinine Ratio 02/27/2023 8  6 - 20 Final    Calcium 02/27/2023 8.9  8.5 - 10.1 mg/dL Final    Total Protein 02/27/2023 7.8  6.4 - 8.2 g/dL Final    Albumin 02/27/2023 3.4 (L)  3.5 - 5.0 g/dL Final    Globulin 02/27/2023 4.4 (H)  2.0 - 4.0 g/dL Final    A/G Ratio 02/27/2023 0.8   Final    Bilirubin, Total 02/27/2023 0.6  >0.0 - 1.2 mg/dL Final    Alk Phos 02/27/2023 125 (H)  41 - 108 U/L Final    ALT 02/27/2023 36  13 - 56 U/L Final    AST 02/27/2023 28  15 - 37 U/L Final    eGFR 02/27/2023 68  >=60 mL/min/1.73m² Final    Hemoglobin A1C 02/27/2023 7.0 (H)  4.5 - 6.6 % Final    Estimated Average Glucose 02/27/2023 147  mg/dL Final    WBC 02/27/2023 5.64  4.50 - 11.00 K/uL Final    RBC 02/27/2023 3.77 (L)  4.20 - 5.40 M/uL Final    Hemoglobin 02/27/2023 11.0 (L)  12.0 - 16.0 g/dL Final    Hematocrit 02/27/2023 34.1 (L)  38.0 - 47.0 % Final    MCV 02/27/2023 90.5  80.0 - 96.0 fL Final    MCH 02/27/2023 29.2  27.0 - 31.0 pg Final    MCHC 02/27/2023 32.3  32.0 - 36.0 g/dL Final    RDW 02/27/2023 16.1 (H)  11.5 - 14.5 % Final    Platelet Count 02/27/2023 349  150 - 400 K/uL Final    MPV 02/27/2023 9.9  9.4 - 12.4 fL Final    Neutrophils % 02/27/2023 54.0  53.0 - 65.0 % Final    Lymphocytes % 02/27/2023 30.5  27.0 - 41.0 % Final    Monocytes % 02/27/2023 8.2 (H)  2.0 - 6.0 % Final     Eosinophils % 02/27/2023 6.0 (H)  1.0 - 4.0 % Final    Basophils % 02/27/2023 1.1 (H)  0.0 - 1.0 % Final    Immature Granulocytes % 02/27/2023 0.2  0.0 - 0.4 % Final    nRBC, Auto 02/27/2023 0.0  <=0.0 % Final    Neutrophils, Abs 02/27/2023 3.05  1.80 - 7.70 K/uL Final    Lymphocytes, Absolute 02/27/2023 1.72  1.00 - 4.80 K/uL Final    Monocytes, Absolute 02/27/2023 0.46  0.00 - 0.80 K/uL Final    Eosinophils, Absolute 02/27/2023 0.34  0.00 - 0.50 K/uL Final    Basophils, Absolute 02/27/2023 0.06  0.00 - 0.20 K/uL Final    Immature Granulocytes, Absolute 02/27/2023 0.01  0.00 - 0.04 K/uL Final    nRBC, Absolute 02/27/2023 0.00  <=0.00 x10e3/uL Final    Diff Type 02/27/2023 Auto   Final   Patient Outreach on 02/20/2023   Component Date Value Ref Range Status    PAP Recommendation External 12/19/2017 Pap in 3 years   Final         Orders Placed This Encounter   Procedures    POCT Urine Drug Screen Presump     Interpretive Information:     Negative:  No drug detected at the cut off level.   Positive:  This result represents presumptive positive for the   tested drug, other substances may yield a positive response other   than the analyte of interest. This result should be utilized for   diagnostic purpose only. Confirmation testing will be performed upon physician request only.            Requested Prescriptions     Pending Prescriptions Disp Refills    HYDROcodone-acetaminophen (NORCO)  mg per tablet 60 tablet 0     Sig: Take 1 tablet by mouth every 12 (twelve) hours as needed for Pain.    HYDROcodone-acetaminophen (NORCO)  mg per tablet 60 tablet 0     Sig: Take 1 tablet by mouth every 12 (twelve) hours as needed for Pain.       Assessment:     1. Rheumatoid arthritis of multiple sites without rheumatoid factor    2. Cervical radiculopathy    3. Encounter for long-term (current) use of other medications           A's of Opioid Risk Assessment  Activity:Patient can perform ADL.   Analgesia:Patients pain is  partially controlled by current medication. Patient has tried OTC medications such as Tylenol and Ibuprofen with out relief.   Adverse Effects: Patient denies constipation or sedation.  Aberrant Behavior:  reviewed with no aberrant drug seeking/taking behavior.  Overdose reversal drug naloxone discussed    Drug screen reviewed      Plan:    Narcan February 2023    She  tramadol from primary care provider March 2, 2023  This prescription was destroyed    She verbalizes if this happens again she will no longer receive narcotics from [facility]    Follows rheumatology rheumatoid arthritis [facility]    No new complaints she states current medications helping control her discomfort     She would like to continue conservative management    Continue home exercise program as directed    Continue current medication    Follow-up 2 months    Dr. Claire, August 2023    Bring original prescription medication bottles/container/box with labels to each visit

## 2023-08-02 RX ORDER — NABUMETONE 750 MG/1
TABLET, FILM COATED ORAL
Qty: 60 TABLET | Refills: 5 | Status: SHIPPED | OUTPATIENT
Start: 2023-08-02 | End: 2024-03-20 | Stop reason: SDUPTHER

## 2023-08-31 ENCOUNTER — EXTERNAL CHRONIC CARE MANAGEMENT (OUTPATIENT)
Dept: PRIMARY CARE CLINIC | Facility: CLINIC | Age: 53
End: 2023-08-31
Payer: MEDICARE

## 2023-08-31 ENCOUNTER — PATIENT OUTREACH (OUTPATIENT)
Dept: ADMINISTRATIVE | Facility: HOSPITAL | Age: 53
End: 2023-08-31

## 2023-08-31 PROCEDURE — G0511 CCM/BHI BY RHC/FQHC 20MIN MO: HCPCS | Mod: ,,, | Performed by: FAMILY MEDICINE

## 2023-08-31 PROCEDURE — G0511 PR CHRONIC CARE MGMT, RHC OR FQHC ONLY, 20 MINS OR MORE: ICD-10-PCS | Mod: ,,, | Performed by: FAMILY MEDICINE

## 2023-08-31 NOTE — PROGRESS NOTES
08/31/2023   --Chart accessed for: [employer] Report  --Care Gaps addressed: colonoscopy, dm labs  Outreach made to patient via telephone--left message  Care Everywhere updates requested and reviewed.  Media reports reviewed.  [facility] and [facility] reviewed.  Immunization Database ([employer]) reviewed. Vaccinations uploaded:none  [facility] abstracted.

## 2023-09-19 DIAGNOSIS — M62.838 MUSCLE SPASM: ICD-10-CM

## 2023-09-19 RX ORDER — CYCLOBENZAPRINE HCL 10 MG
10 TABLET ORAL 3 TIMES DAILY PRN
Qty: 90 TABLET | Refills: 2 | Status: SHIPPED | OUTPATIENT
Start: 2023-09-19

## 2023-09-30 ENCOUNTER — EXTERNAL CHRONIC CARE MANAGEMENT (OUTPATIENT)
Dept: PRIMARY CARE CLINIC | Facility: CLINIC | Age: 53
End: 2023-09-30
Payer: MEDICARE

## 2023-09-30 PROCEDURE — G0511 PR CHRONIC CARE MGMT, RHC OR FQHC ONLY, 20 MINS OR MORE: ICD-10-PCS | Mod: ,,, | Performed by: FAMILY MEDICINE

## 2023-09-30 PROCEDURE — G0511 CCM/BHI BY RHC/FQHC 20MIN MO: HCPCS | Mod: ,,, | Performed by: FAMILY MEDICINE

## 2023-10-04 ENCOUNTER — OFFICE VISIT (OUTPATIENT)
Dept: PAIN MEDICINE | Facility: CLINIC | Age: 53
End: 2023-10-04
Payer: MEDICARE

## 2023-10-04 VITALS
SYSTOLIC BLOOD PRESSURE: 178 MMHG | HEIGHT: 69 IN | RESPIRATION RATE: 18 BRPM | WEIGHT: 293 LBS | BODY MASS INDEX: 43.4 KG/M2 | DIASTOLIC BLOOD PRESSURE: 89 MMHG | HEART RATE: 56 BPM

## 2023-10-04 DIAGNOSIS — M47.817 LUMBOSACRAL SPONDYLOSIS WITHOUT MYELOPATHY: Chronic | ICD-10-CM

## 2023-10-04 DIAGNOSIS — G89.4 CHRONIC PAIN SYNDROME: Chronic | ICD-10-CM

## 2023-10-04 DIAGNOSIS — M06.09 RHEUMATOID ARTHRITIS OF MULTIPLE SITES WITHOUT RHEUMATOID FACTOR: Primary | Chronic | ICD-10-CM

## 2023-10-04 PROCEDURE — 1159F PR MEDICATION LIST DOCUMENTED IN MEDICAL RECORD: ICD-10-PCS | Mod: CPTII,,, | Performed by: PAIN MEDICINE

## 2023-10-04 PROCEDURE — 3079F PR MOST RECENT DIASTOLIC BLOOD PRESSURE 80-89 MM HG: ICD-10-PCS | Mod: CPTII,,, | Performed by: PAIN MEDICINE

## 2023-10-04 PROCEDURE — 3008F PR BODY MASS INDEX (BMI) DOCUMENTED: ICD-10-PCS | Mod: CPTII,,, | Performed by: PAIN MEDICINE

## 2023-10-04 PROCEDURE — 3077F PR MOST RECENT SYSTOLIC BLOOD PRESSURE >= 140 MM HG: ICD-10-PCS | Mod: CPTII,,, | Performed by: PAIN MEDICINE

## 2023-10-04 PROCEDURE — 99215 OFFICE O/P EST HI 40 MIN: CPT | Mod: PBBFAC | Performed by: PAIN MEDICINE

## 2023-10-04 PROCEDURE — 3051F HG A1C>EQUAL 7.0%<8.0%: CPT | Mod: CPTII,,, | Performed by: PAIN MEDICINE

## 2023-10-04 PROCEDURE — 99214 OFFICE O/P EST MOD 30 MIN: CPT | Mod: S$PBB,,, | Performed by: PAIN MEDICINE

## 2023-10-04 PROCEDURE — 1159F MED LIST DOCD IN RCRD: CPT | Mod: CPTII,,, | Performed by: PAIN MEDICINE

## 2023-10-04 PROCEDURE — 3051F PR MOST RECENT HEMOGLOBIN A1C LEVEL 7.0 - < 8.0%: ICD-10-PCS | Mod: CPTII,,, | Performed by: PAIN MEDICINE

## 2023-10-04 PROCEDURE — 3008F BODY MASS INDEX DOCD: CPT | Mod: CPTII,,, | Performed by: PAIN MEDICINE

## 2023-10-04 PROCEDURE — 3079F DIAST BP 80-89 MM HG: CPT | Mod: CPTII,,, | Performed by: PAIN MEDICINE

## 2023-10-04 PROCEDURE — 3077F SYST BP >= 140 MM HG: CPT | Mod: CPTII,,, | Performed by: PAIN MEDICINE

## 2023-10-04 PROCEDURE — 99214 PR OFFICE/OUTPT VISIT, EST, LEVL IV, 30-39 MIN: ICD-10-PCS | Mod: S$PBB,,, | Performed by: PAIN MEDICINE

## 2023-10-04 RX ORDER — HYDROCODONE BITARTRATE AND ACETAMINOPHEN 10; 325 MG/1; MG/1
1 TABLET ORAL EVERY 12 HOURS PRN
Qty: 60 TABLET | Refills: 0 | Status: SHIPPED | OUTPATIENT
Start: 2023-11-07 | End: 2023-11-30 | Stop reason: SDUPTHER

## 2023-10-04 RX ORDER — HYDROCODONE BITARTRATE AND ACETAMINOPHEN 10; 325 MG/1; MG/1
1 TABLET ORAL EVERY 12 HOURS PRN
Qty: 60 TABLET | Refills: 0 | Status: SHIPPED | OUTPATIENT
Start: 2023-10-06 | End: 2023-11-30 | Stop reason: SDUPTHER

## 2023-10-04 NOTE — PROGRESS NOTES
She Disclaimer: This note has been generated using voice-recognition software. There may be typographical errors that have been missed during proof-reading        Patient ID: Natali Claire is a 53 y.o. female.      Chief Complaint: No chief complaint on file.      53 year old female returns for re-evaluation of diffuse joint, bilateral shoulder and lower back pain secondary to rheumatoid arthritis.  The lower back pain remains unchanged from the previous office visit.  The pain is axial without radicular involvement.  Her current medications are providing some relief.  She is unable to tolerate NSAIDs due to diabetes mellitus.  She defers nerve block injections surgical evaluation. She returns today for medication                  Pain Assessment  Pain Assessment: 0-10  Pain Score:   4  Pain Location: Back  Pain Descriptors: Dull, Stabbing  Pain Frequency: Intermittent  Pain Onset: Awakened from sleep  Clinical Progression: Not changed  Aggravating Factors: Other (Comment) (moving)  Pain Intervention(s): Medication (See eMAR), Home medication, Heat applied      A's of Opioid Risk Assessment  Activity:Patient cannot perform ADL.   Analgesia:Patients pain is not controlled by current medication.   Adverse Effects: Patient denies constipation or sedation.  Aberrant Behavior:  reviewed with no aberrant drug seeking/taking behavior.      Patient denies any suicidal or homicidal ideations    Physical Therapy/Home Exercise: July 2023 without benefit          Review of Systems   Constitutional: Negative.    HENT: Negative.     Eyes: Negative.    Respiratory: Negative.     Cardiovascular: Negative.    Gastrointestinal: Negative.    Endocrine: Negative.    Genitourinary: Negative.    Musculoskeletal:  Positive for arthralgias, neck pain and neck stiffness.   Integumentary:  Negative.   Neurological:  Positive for weakness (Left upper extremity) and numbness (Left upper extremity).   Hematological: Negative.     Psychiatric/Behavioral:  Positive for sleep disturbance.              Past Medical History:   Diagnosis Date    Anxiety     Arthritis     Chronic low back pain     Chronic pain syndrome     Degenerative joint disease involving multiple joints     Diabetes mellitus, type 2     GERD (gastroesophageal reflux disease)     Hyperlipidemia     Hypertension     Lumbar radiculopathy     Lumbosacral spondylosis     Multiple joint pain     Onychomycosis     Osteoarthritis     Other long term (current) drug therapy     Rheumatoid arthritis      Past Surgical History:   Procedure Laterality Date    BREAST SURGERY      CHOLECYSTECTOMY      TUBAL LIGATION       Social History     Socioeconomic History    Marital status: Single    Number of children: 2   Occupational History    Occupation: disable   Tobacco Use    Smoking status: Never     Passive exposure: Never    Smokeless tobacco: Never   Substance and Sexual Activity    Alcohol use: Not Currently    Drug use: Never    Sexual activity: Yes     Partners: Male     Family History   Problem Relation Age of Onset    Diabetes Mother     Heart disease Father     Diabetes Sister     Breast cancer Paternal Cousin      Review of patient's allergies indicates:  No Known Allergies  has a current medication list which includes the following prescription(s): atorvastatin, ciclopirox, cyclobenzaprine, diclofenac sodium, enbrel sureclick, esomeprazole, esomeprazole, ferrous sulfate, folic acid, glimepiride, hydrochlorothiazide, loratadine, metformin, methotrexate, metoprolol tartrate, montelukast, nabumetone, omeprazole, penicillin v potassium, potassium chloride sa, tizanidine, tramadol, hydrocodone-acetaminophen, and [START ON 11/7/2023] hydrocodone-acetaminophen.      Objective:  Vitals:    10/04/23 1454   BP: (!) 178/89   Pulse: (!) 56   Resp: 18        Physical Exam  Vitals and nursing note reviewed.   Constitutional:       General: She is not in acute distress.     Appearance: Normal  appearance. She is not ill-appearing, toxic-appearing or diaphoretic.   HENT:      Head: Normocephalic and atraumatic.      Nose: Nose normal.      Mouth/Throat:      Mouth: Mucous membranes are moist.   Eyes:      Extraocular Movements: Extraocular movements intact.      Pupils: Pupils are equal, round, and reactive to light.   Cardiovascular:      Rate and Rhythm: Normal rate and regular rhythm.      Heart sounds: Normal heart sounds.   Pulmonary:      Effort: Pulmonary effort is normal. No respiratory distress.      Breath sounds: Normal breath sounds. No stridor. No wheezing or rhonchi.   Abdominal:      General: Bowel sounds are normal.      Palpations: Abdomen is soft.   Musculoskeletal:         General: No swelling or deformity.      Cervical back: Spasms and tenderness present. Pain with movement present. Decreased range of motion.      Thoracic back: Normal.      Lumbar back: No spasms, tenderness or bony tenderness. Normal range of motion. Negative right straight leg raise test and negative left straight leg raise test. No scoliosis.      Right lower leg: No edema.      Left lower leg: No edema.      Comments: Cervical pain with flexion, extension and lateral rotation   Skin:     General: Skin is warm.   Neurological:      General: No focal deficit present.      Mental Status: She is alert and oriented to person, place, and time. Mental status is at baseline.      Cranial Nerves: No cranial nerve deficit.      Sensory: Sensation is intact. No sensory deficit.      Motor: No weakness.      Coordination: Coordination normal.      Gait: Gait normal.      Deep Tendon Reflexes: Reflexes are normal and symmetric.   Psychiatric:         Mood and Affect: Mood normal.         Behavior: Behavior normal.           Assessment:      1. Rheumatoid arthritis of multiple sites without rheumatoid factor    2. Chronic pain syndrome    3. Lumbosacral spondylosis without myelopathy          Plan:  1. reviewed  2.Addiction,  Dependency, Tolerance, Opioid abuse-misuse, Death, Diversion Discussed. Overdose reversal drug Naloxone discussed.  3.Refill/Continue medications for pain control and function       Requested Prescriptions     Signed Prescriptions Disp Refills    HYDROcodone-acetaminophen (NORCO)  mg per tablet 60 tablet 0     Sig: Take 1 tablet by mouth every 12 (twelve) hours as needed for Pain.    HYDROcodone-acetaminophen (NORCO)  mg per tablet 60 tablet 0     Sig: Take 1 tablet by mouth every 12 (twelve) hours as needed for Pain.     4.Patient defers nerve block injections, physical therapy or surgical consultation  5.Follow with MICHAEL Slater in 2 months for re-evaluation and medication refill         report:  Reviewed and consistent with medication use as prescribed.      The total time spent for evaluation and management on 10/13/2023 including reviewing separately obtained history, performing a medically appropriate exam and evaluation, documenting clinical information in the health record, independently interpreting results and communicating them to the patient/family/caregiver, and ordering medications/tests/procedures was between 15-29 minutes.    The above plan and management options were discussed at length with patient. Patient is in agreement with the above and verbalized understanding. It will be communicated with the referring physician via electronic record, fax, or mail.

## 2023-10-24 ENCOUNTER — HOSPITAL ENCOUNTER (OUTPATIENT)
Dept: RADIOLOGY | Facility: HOSPITAL | Age: 53
Discharge: HOME | End: 2023-10-24
Attending: FAMILY MEDICINE
Payer: MEDICARE

## 2023-10-24 VITALS — HEIGHT: 69 IN | WEIGHT: 293 LBS | BODY MASS INDEX: 43.4 KG/M2

## 2023-10-24 DIAGNOSIS — Z12.31 OTHER SCREENING MAMMOGRAM: ICD-10-CM

## 2023-10-24 PROCEDURE — 77067 SCR MAMMO BI INCL CAD: CPT | Mod: TC

## 2023-10-31 ENCOUNTER — EXTERNAL CHRONIC CARE MANAGEMENT (OUTPATIENT)
Dept: PRIMARY CARE CLINIC | Facility: CLINIC | Age: 53
End: 2023-10-31
Payer: MEDICARE

## 2023-10-31 PROCEDURE — G0511 PR CHRONIC CARE MGMT, RHC OR FQHC ONLY, 20 MINS OR MORE: ICD-10-PCS | Mod: ,,, | Performed by: FAMILY MEDICINE

## 2023-10-31 PROCEDURE — G0511 CCM/BHI BY RHC/FQHC 20MIN MO: HCPCS | Mod: ,,, | Performed by: FAMILY MEDICINE

## 2023-11-22 ENCOUNTER — CLINICAL SUPPORT (OUTPATIENT)
Dept: PRIMARY CARE CLINIC | Facility: CLINIC | Age: 53
End: 2023-11-22
Payer: MEDICARE

## 2023-11-22 DIAGNOSIS — M19.90 ARTHRITIS: Primary | ICD-10-CM

## 2023-11-22 DIAGNOSIS — M62.838 MUSCLE SPASM: ICD-10-CM

## 2023-11-22 PROCEDURE — 96372 PR INJECTION,THERAP/PROPH/DIAG2ST, IM OR SUBCUT: ICD-10-PCS | Mod: ,,, | Performed by: FAMILY MEDICINE

## 2023-11-22 PROCEDURE — 96372 THER/PROPH/DIAG INJ SC/IM: CPT | Mod: ,,, | Performed by: FAMILY MEDICINE

## 2023-11-22 RX ORDER — KETOROLAC TROMETHAMINE 30 MG/ML
60 INJECTION, SOLUTION INTRAMUSCULAR; INTRAVENOUS
Status: COMPLETED | OUTPATIENT
Start: 2023-11-22 | End: 2023-11-22

## 2023-11-22 RX ORDER — ORPHENADRINE CITRATE 30 MG/ML
60 INJECTION INTRAMUSCULAR; INTRAVENOUS
Status: COMPLETED | OUTPATIENT
Start: 2023-11-22 | End: 2023-11-22

## 2023-11-22 RX ADMIN — KETOROLAC TROMETHAMINE 60 MG: 30 INJECTION, SOLUTION INTRAMUSCULAR; INTRAVENOUS at 09:11

## 2023-11-22 RX ADMIN — ORPHENADRINE CITRATE 60 MG: 30 INJECTION INTRAMUSCULAR; INTRAVENOUS at 09:11

## 2023-11-30 ENCOUNTER — EXTERNAL CHRONIC CARE MANAGEMENT (OUTPATIENT)
Dept: PRIMARY CARE CLINIC | Facility: CLINIC | Age: 53
End: 2023-11-30
Payer: MEDICARE

## 2023-11-30 PROCEDURE — G0511 PR CHRONIC CARE MGMT, RHC OR FQHC ONLY, 20 MINS OR MORE: ICD-10-PCS | Mod: ,,, | Performed by: FAMILY MEDICINE

## 2023-11-30 PROCEDURE — G0511 CCM/BHI BY RHC/FQHC 20MIN MO: HCPCS | Mod: ,,, | Performed by: FAMILY MEDICINE

## 2023-11-30 NOTE — PROGRESS NOTES
Subjective:         Patient ID: Natali Claire is a 53 y.o. female.    Chief Complaint: Low-back Pain, Shoulder Pain, and Leg Pain        Pain  This is a chronic problem. The current episode started more than 1 year ago. The problem occurs daily. The problem has been waxing and waning. Associated symptoms include arthralgias and neck pain. Pertinent negatives include no anorexia, chest pain, chills, coughing, diaphoresis, fever, sore throat, vertigo or vomiting.     Review of Systems   Constitutional:  Negative for activity change, appetite change, chills, diaphoresis, fever and unexpected weight change.   HENT:  Negative for drooling, ear discharge, ear pain, facial swelling, nosebleeds, sore throat, trouble swallowing, voice change and goiter.    Eyes:  Negative for photophobia, pain, discharge, redness and visual disturbance.   Respiratory:  Negative for apnea, cough, choking, chest tightness, shortness of breath, wheezing and stridor.    Cardiovascular:  Negative for chest pain, palpitations and leg swelling.   Gastrointestinal:  Negative for abdominal distention, anorexia, diarrhea, rectal pain, vomiting and fecal incontinence.   Endocrine: Negative for cold intolerance, heat intolerance, polydipsia, polyphagia and polyuria.   Genitourinary:  Negative for bladder incontinence, dysuria, flank pain, frequency and hot flashes.   Musculoskeletal:  Positive for arthralgias, back pain, leg pain and neck pain.   Integumentary:  Negative for color change and pallor.   Allergic/Immunologic: Negative for immunocompromised state.   Neurological:  Negative for dizziness, vertigo, seizures, syncope, facial asymmetry, speech difficulty, light-headedness, memory loss and coordination difficulties.   Hematological:  Negative for adenopathy. Does not bruise/bleed easily.   Psychiatric/Behavioral:  Negative for agitation, behavioral problems, confusion, decreased concentration, dysphoric mood, hallucinations, self-injury and  "suicidal ideas. The patient is not nervous/anxious and is not hyperactive.            Past Medical History:   Diagnosis Date    Anxiety     Arthritis     Chronic low back pain     Chronic pain syndrome     Degenerative joint disease involving multiple joints     Diabetes mellitus, type 2     GERD (gastroesophageal reflux disease)     Hyperlipidemia     Hypertension     Lumbar radiculopathy     Lumbosacral spondylosis     Multiple joint pain     Onychomycosis     Osteoarthritis     Other long term (current) drug therapy     Rheumatoid arthritis      Past Surgical History:   Procedure Laterality Date    BREAST SURGERY      CHOLECYSTECTOMY      TUBAL LIGATION       Social History     Socioeconomic History    Marital status: Single    Number of children: 2   Occupational History    Occupation: disable   Tobacco Use    Smoking status: Never     Passive exposure: Never    Smokeless tobacco: Never   Substance and Sexual Activity    Alcohol use: Not Currently    Drug use: Never    Sexual activity: Yes     Partners: Male     Family History   Problem Relation Age of Onset    Diabetes Mother     Heart disease Father     Diabetes Sister     Breast cancer Paternal Cousin      Review of patient's allergies indicates:  No Known Allergies     Objective:  Vitals:    12/04/23 1401   BP: 107/85   Pulse: 71   Resp: 18   Weight: (!) 137.4 kg (303 lb)   Height: 5' 9" (1.753 m)   PainSc:   3           Physical Exam  Vitals and nursing note reviewed. Exam conducted with a chaperone present.   Constitutional:       General: She is awake. She is not in acute distress.     Appearance: Normal appearance. She is not ill-appearing, toxic-appearing or diaphoretic.   HENT:      Head: Normocephalic and atraumatic.      Nose: Nose normal.      Mouth/Throat:      Mouth: Mucous membranes are moist.      Pharynx: Oropharynx is clear.   Eyes:      Conjunctiva/sclera: Conjunctivae normal.      Pupils: Pupils are equal, round, and reactive to light. "   Cardiovascular:      Rate and Rhythm: Normal rate.   Pulmonary:      Effort: Pulmonary effort is normal. No respiratory distress.   Abdominal:      Palpations: Abdomen is soft.   Musculoskeletal:         General: Normal range of motion.      Cervical back: Normal range of motion and neck supple. Tenderness present.      Thoracic back: Tenderness present.      Lumbar back: Tenderness present.   Skin:     General: Skin is warm and dry.   Neurological:      General: No focal deficit present.      Mental Status: She is alert and oriented to person, place, and time. Mental status is at baseline.      Cranial Nerves: No cranial nerve deficit (II-XII).   Psychiatric:         Mood and Affect: Mood normal.         Behavior: Behavior normal. Behavior is cooperative.         Thought Content: Thought content normal.           Mammo Digital Screening Bilat w/ Kael  Narrative: Result:   Mammo Digital Screening Bilat w/ Kael     History:  Patient is 53 y.o. and is seen for a screening mammogram.    Films Compared:  Compared to: 10/18/2022 Mammo Digital Screening Bilat, 10/26/2021 Mammo   Digital Diagnostic Left, and 10/05/2021 Mammo Digital Screening Bilat     Findings:  This procedure was performed using tomosynthesis. Computer-aided detection   was utilized in the interpretation of this examination.  The breasts have scattered areas of fibroglandular density. There is no   evidence of suspicious masses, calcifications, or other abnormal findings.  Impression: Bilateral  There is no mammographic evidence of malignancy.    BI-RADS Category:   Overall: 2 - Benign       Recommendation:  Routine screening mammogram in 1 year is recommended.    Your estimated lifetime risk of breast cancer (to age 85) based on   Tyrer-Cuzick risk assessment model is Tyrer-Cuzick: 7.15 %. According to   the American Cancer Society, patients with a lifetime breast cancer risk   of 20% or higher might benefit from supplemental screening tests.          Office Visit on 08/01/2023   Component Date Value Ref Range Status    POC Amphetamines 08/01/2023 Negative  Negative, Inconclusive Final    POC Barbiturates 08/01/2023 Negative  Negative, Inconclusive Final    POC Benzodiazepines 08/01/2023 Negative  Negative, Inconclusive Final    POC Cocaine 08/01/2023 Negative  Negative, Inconclusive Final    POC THC 08/01/2023 Negative  Negative, Inconclusive Final    POC Methadone 08/01/2023 Negative  Negative, Inconclusive Final    POC Methamphetamine 08/01/2023 Negative  Negative, Inconclusive Final    POC Opiates 08/01/2023 Presumptive Positive (A)  Negative, Inconclusive Final    POC Oxycodone 08/01/2023 Negative  Negative, Inconclusive Final    POC Phencyclidine 08/01/2023 Inconclusive  Negative, Inconclusive Final    POC Methylenedioxymethamphetamine * 08/01/2023 Inconclusive  Negative, Inconclusive Final    POC Tricyclic Antidepressants 08/01/2023 Negative  Negative, Inconclusive Final    POC Buprenorphine 08/01/2023 Negative   Final     Acceptable 08/01/2023 Yes   Final    POC Temperature (Urine) 08/01/2023 92   Final   Office Visit on 07/26/2023   Component Date Value Ref Range Status    POC Glucose 07/26/2023 131 (A)  70 - 110 MG/DL Final   Office Visit on 06/14/2023   Component Date Value Ref Range Status    Sodium 06/14/2023 137  136 - 145 mmol/L Final    Potassium 06/14/2023 3.4 (L)  3.5 - 5.1 mmol/L Final    Chloride 06/14/2023 105  98 - 107 mmol/L Final    CO2 06/14/2023 30  21 - 32 mmol/L Final    Anion Gap 06/14/2023 5 (L)  7 - 16 mmol/L Final    Glucose 06/14/2023 139 (H)  74 - 106 mg/dL Final    BUN 06/14/2023 11  7 - 18 mg/dL Final    Creatinine 06/14/2023 1.06 (H)  0.55 - 1.02 mg/dL Final    BUN/Creatinine Ratio 06/14/2023 10  6 - 20 Final    Calcium 06/14/2023 9.0  8.5 - 10.1 mg/dL Final    Total Protein 06/14/2023 7.2  6.4 - 8.2 g/dL Final    Albumin 06/14/2023 3.0 (L)  3.5 - 5.0 g/dL Final    Globulin 06/14/2023 4.2 (H)  2.0 - 4.0 g/dL  Final    A/G Ratio 06/14/2023 0.7   Final    Bilirubin, Total 06/14/2023 0.4  >0.0 - 1.2 mg/dL Final    Alk Phos 06/14/2023 110 (H)  41 - 108 U/L Final    ALT 06/14/2023 27  13 - 56 U/L Final    AST 06/14/2023 19  15 - 37 U/L Final    eGFR 06/14/2023 63  >=60 mL/min/1.73m2 Final    Hemoglobin A1C 06/14/2023 7.2 (H)  4.5 - 6.6 % Final    Estimated Average Glucose 06/14/2023 154  mg/dL Final    Triglycerides 06/14/2023 125  35 - 150 mg/dL Final    Cholesterol 06/14/2023 158  0 - 200 mg/dL Final    HDL Cholesterol 06/14/2023 31 (L)  40 - 60 mg/dL Final    Cholesterol/HDL Ratio (Risk Factor) 06/14/2023 5.1   Final    Non-HDL 06/14/2023 127  mg/dL Final    LDL Calculated 06/14/2023 102  mg/dL Final    LDL/HDL 06/14/2023 3.3   Final    VLDL 06/14/2023 25  mg/dL Final    WBC 06/14/2023 5.10  4.50 - 11.00 K/uL Final    RBC 06/14/2023 3.88 (L)  4.20 - 5.40 M/uL Final    Hemoglobin 06/14/2023 11.5 (L)  12.0 - 16.0 g/dL Final    Hematocrit 06/14/2023 35.3 (L)  38.0 - 47.0 % Final    MCV 06/14/2023 91.0  80.0 - 96.0 fL Final    MCH 06/14/2023 29.6  27.0 - 31.0 pg Final    MCHC 06/14/2023 32.6  32.0 - 36.0 g/dL Final    RDW 06/14/2023 15.1 (H)  11.5 - 14.5 % Final    Platelet Count 06/14/2023 343  150 - 400 K/uL Final    MPV 06/14/2023 9.6  9.4 - 12.4 fL Final    Neutrophils % 06/14/2023 46.0 (L)  53.0 - 65.0 % Final    Lymphocytes % 06/14/2023 38.2  27.0 - 41.0 % Final    Monocytes % 06/14/2023 7.3 (H)  2.0 - 6.0 % Final    Eosinophils % 06/14/2023 6.5 (H)  1.0 - 4.0 % Final    Basophils % 06/14/2023 1.4 (H)  0.0 - 1.0 % Final    Immature Granulocytes % 06/14/2023 0.6 (H)  0.0 - 0.4 % Final    nRBC, Auto 06/14/2023 0.0  <=0.0 % Final    Neutrophils, Abs 06/14/2023 2.35  1.80 - 7.70 K/uL Final    Lymphocytes, Absolute 06/14/2023 1.95  1.00 - 4.80 K/uL Final    Monocytes, Absolute 06/14/2023 0.37  0.00 - 0.80 K/uL Final    Eosinophils, Absolute 06/14/2023 0.33  0.00 - 0.50 K/uL Final    Basophils, Absolute 06/14/2023 0.07  0.00 -  0.20 K/uL Final    Immature Granulocytes, Absolute 06/14/2023 0.03  0.00 - 0.04 K/uL Final    nRBC, Absolute 06/14/2023 0.00  <=0.00 x10e3/uL Final    Diff Type 06/14/2023 Auto   Final   Office Visit on 06/07/2023   Component Date Value Ref Range Status    Rapid Strep A Screen 06/07/2023 Negative  Negative Final     Acceptable 06/07/2023 Yes   Final         Orders Placed This Encounter   Procedures    POCT Urine Drug Screen Presump     Interpretive Information:     Negative:  No drug detected at the cut off level.   Positive:  This result represents presumptive positive for the   tested drug, other substances may yield a positive response other   than the analyte of interest. This result should be utilized for   diagnostic purpose only. Confirmation testing will be performed upon physician request only.            Requested Prescriptions     Signed Prescriptions Disp Refills    HYDROcodone-acetaminophen (NORCO)  mg per tablet 60 tablet 0     Sig: Take 1 tablet by mouth every 12 (twelve) hours as needed for Pain.    HYDROcodone-acetaminophen (NORCO)  mg per tablet 60 tablet 0     Sig: Take 1 tablet by mouth every 12 (twelve) hours as needed for Pain.       Assessment:     1. Rheumatoid arthritis involving multiple sites with positive rheumatoid factor    2. Lumbosacral spondylosis without myelopathy    3. Encounter for long-term (current) use of other medications           A's of Opioid Risk Assessment  Activity:Patient can perform ADL.   Analgesia:Patients pain is partially controlled by current medication. Patient has tried OTC medications such as Tylenol and Ibuprofen with out relief.   Adverse Effects: Patient denies constipation or sedation.  Aberrant Behavior:  reviewed with no aberrant drug seeking/taking behavior.  Overdose reversal drug naloxone discussed    Drug screen reviewed      X-ray lumbar spine [facility] November 11, 2020, multiple level degenerative changes no  instability noted with flexion-extension no fracture noted      Plan:    Narcan February 2023    She picked up tramadol from primary care provider March 2, 2023  This prescription was destroyed    She verbalizes if this happens again she will no longer receive narcotics [facility]    Follows rheumatology rheumatoid arthritis [facility]    She would like to continue conservative management she states current medication is helping with her discomfort     Having some back buttock and leg pain numbness and tingling left greater right radicular in nature     Considering lumbar procedure    She completed physical therapy she will obtain these notes bring to clinic next visit     No MRI lumbar spine in [facility] or in [facility]    Continue home exercise program as directed    Continue current medication    Follow-up 2 months    Dr. Claire, October 2024    Bring original prescription medication bottles/container/box with labels to each visit

## 2023-12-04 ENCOUNTER — OFFICE VISIT (OUTPATIENT)
Dept: PAIN MEDICINE | Facility: CLINIC | Age: 53
End: 2023-12-04
Payer: MEDICARE

## 2023-12-04 VITALS
DIASTOLIC BLOOD PRESSURE: 85 MMHG | SYSTOLIC BLOOD PRESSURE: 107 MMHG | RESPIRATION RATE: 18 BRPM | BODY MASS INDEX: 43.4 KG/M2 | WEIGHT: 293 LBS | HEIGHT: 69 IN | HEART RATE: 71 BPM

## 2023-12-04 DIAGNOSIS — M47.817 LUMBOSACRAL SPONDYLOSIS WITHOUT MYELOPATHY: Chronic | ICD-10-CM

## 2023-12-04 DIAGNOSIS — M05.79 RHEUMATOID ARTHRITIS INVOLVING MULTIPLE SITES WITH POSITIVE RHEUMATOID FACTOR: Primary | Chronic | ICD-10-CM

## 2023-12-04 DIAGNOSIS — Z79.899 ENCOUNTER FOR LONG-TERM (CURRENT) USE OF OTHER MEDICATIONS: ICD-10-CM

## 2023-12-04 PROCEDURE — 3079F PR MOST RECENT DIASTOLIC BLOOD PRESSURE 80-89 MM HG: ICD-10-PCS | Mod: CPTII,,, | Performed by: PHYSICIAN ASSISTANT

## 2023-12-04 PROCEDURE — 3074F SYST BP LT 130 MM HG: CPT | Mod: CPTII,,, | Performed by: PHYSICIAN ASSISTANT

## 2023-12-04 PROCEDURE — 3051F HG A1C>EQUAL 7.0%<8.0%: CPT | Mod: CPTII,,, | Performed by: PHYSICIAN ASSISTANT

## 2023-12-04 PROCEDURE — 1159F PR MEDICATION LIST DOCUMENTED IN MEDICAL RECORD: ICD-10-PCS | Mod: CPTII,,, | Performed by: PHYSICIAN ASSISTANT

## 2023-12-04 PROCEDURE — 99999PBSHW POCT URINE DRUG SCREEN PRESUMP: ICD-10-PCS | Mod: PBBFAC,,,

## 2023-12-04 PROCEDURE — 3008F PR BODY MASS INDEX (BMI) DOCUMENTED: ICD-10-PCS | Mod: CPTII,,, | Performed by: PHYSICIAN ASSISTANT

## 2023-12-04 PROCEDURE — 99214 PR OFFICE/OUTPT VISIT, EST, LEVL IV, 30-39 MIN: ICD-10-PCS | Mod: S$PBB,,, | Performed by: PHYSICIAN ASSISTANT

## 2023-12-04 PROCEDURE — 3074F PR MOST RECENT SYSTOLIC BLOOD PRESSURE < 130 MM HG: ICD-10-PCS | Mod: CPTII,,, | Performed by: PHYSICIAN ASSISTANT

## 2023-12-04 PROCEDURE — 3051F PR MOST RECENT HEMOGLOBIN A1C LEVEL 7.0 - < 8.0%: ICD-10-PCS | Mod: CPTII,,, | Performed by: PHYSICIAN ASSISTANT

## 2023-12-04 PROCEDURE — 99999PBSHW POCT URINE DRUG SCREEN PRESUMP: Mod: PBBFAC,,,

## 2023-12-04 PROCEDURE — 1159F MED LIST DOCD IN RCRD: CPT | Mod: CPTII,,, | Performed by: PHYSICIAN ASSISTANT

## 2023-12-04 PROCEDURE — 80305 DRUG TEST PRSMV DIR OPT OBS: CPT | Mod: PBBFAC | Performed by: PHYSICIAN ASSISTANT

## 2023-12-04 PROCEDURE — 99214 OFFICE O/P EST MOD 30 MIN: CPT | Mod: S$PBB,,, | Performed by: PHYSICIAN ASSISTANT

## 2023-12-04 PROCEDURE — 3079F DIAST BP 80-89 MM HG: CPT | Mod: CPTII,,, | Performed by: PHYSICIAN ASSISTANT

## 2023-12-04 PROCEDURE — 99215 OFFICE O/P EST HI 40 MIN: CPT | Mod: PBBFAC | Performed by: PHYSICIAN ASSISTANT

## 2023-12-04 PROCEDURE — 3008F BODY MASS INDEX DOCD: CPT | Mod: CPTII,,, | Performed by: PHYSICIAN ASSISTANT

## 2023-12-04 RX ORDER — HYDROCODONE BITARTRATE AND ACETAMINOPHEN 10; 325 MG/1; MG/1
1 TABLET ORAL EVERY 12 HOURS PRN
Qty: 60 TABLET | Refills: 0 | Status: SHIPPED | OUTPATIENT
Start: 2023-12-08 | End: 2024-01-07

## 2023-12-04 RX ORDER — HYDROCODONE BITARTRATE AND ACETAMINOPHEN 10; 325 MG/1; MG/1
1 TABLET ORAL EVERY 12 HOURS PRN
Qty: 60 TABLET | Refills: 0 | Status: SHIPPED | OUTPATIENT
Start: 2024-01-06 | End: 2024-01-30 | Stop reason: SDUPTHER

## 2023-12-28 ENCOUNTER — OFFICE VISIT (OUTPATIENT)
Dept: PRIMARY CARE CLINIC | Facility: CLINIC | Age: 53
End: 2023-12-28
Payer: MEDICARE

## 2023-12-28 VITALS
DIASTOLIC BLOOD PRESSURE: 82 MMHG | BODY MASS INDEX: 43.4 KG/M2 | TEMPERATURE: 98 F | HEART RATE: 82 BPM | SYSTOLIC BLOOD PRESSURE: 115 MMHG | WEIGHT: 293 LBS | HEIGHT: 69 IN | RESPIRATION RATE: 18 BRPM | OXYGEN SATURATION: 96 %

## 2023-12-28 DIAGNOSIS — J06.9 UPPER RESPIRATORY TRACT INFECTION, UNSPECIFIED TYPE: ICD-10-CM

## 2023-12-28 DIAGNOSIS — R52 GENERALIZED BODY ACHES: Primary | ICD-10-CM

## 2023-12-28 LAB
CTP QC/QA: YES
FLUAV AG NPH QL: NEGATIVE
FLUBV AG NPH QL: NEGATIVE

## 2023-12-28 PROCEDURE — 96372 THER/PROPH/DIAG INJ SC/IM: CPT | Mod: ,,, | Performed by: NURSE PRACTITIONER

## 2023-12-28 PROCEDURE — 3079F PR MOST RECENT DIASTOLIC BLOOD PRESSURE 80-89 MM HG: ICD-10-PCS | Mod: ,,, | Performed by: NURSE PRACTITIONER

## 2023-12-28 PROCEDURE — 87804 POCT INFLUENZA A/B: ICD-10-PCS | Mod: 59,QW,, | Performed by: NURSE PRACTITIONER

## 2023-12-28 PROCEDURE — 99213 OFFICE O/P EST LOW 20 MIN: CPT | Mod: 25,,, | Performed by: NURSE PRACTITIONER

## 2023-12-28 PROCEDURE — 3051F HG A1C>EQUAL 7.0%<8.0%: CPT | Mod: ,,, | Performed by: NURSE PRACTITIONER

## 2023-12-28 PROCEDURE — 3008F PR BODY MASS INDEX (BMI) DOCUMENTED: ICD-10-PCS | Mod: ,,, | Performed by: NURSE PRACTITIONER

## 2023-12-28 PROCEDURE — 99213 PR OFFICE/OUTPT VISIT, EST, LEVL III, 20-29 MIN: ICD-10-PCS | Mod: 25,,, | Performed by: NURSE PRACTITIONER

## 2023-12-28 PROCEDURE — 3074F PR MOST RECENT SYSTOLIC BLOOD PRESSURE < 130 MM HG: ICD-10-PCS | Mod: ,,, | Performed by: NURSE PRACTITIONER

## 2023-12-28 PROCEDURE — 3074F SYST BP LT 130 MM HG: CPT | Mod: ,,, | Performed by: NURSE PRACTITIONER

## 2023-12-28 PROCEDURE — 3051F PR MOST RECENT HEMOGLOBIN A1C LEVEL 7.0 - < 8.0%: ICD-10-PCS | Mod: ,,, | Performed by: NURSE PRACTITIONER

## 2023-12-28 PROCEDURE — 3008F BODY MASS INDEX DOCD: CPT | Mod: ,,, | Performed by: NURSE PRACTITIONER

## 2023-12-28 PROCEDURE — 3079F DIAST BP 80-89 MM HG: CPT | Mod: ,,, | Performed by: NURSE PRACTITIONER

## 2023-12-28 PROCEDURE — 96372 PR INJECTION,THERAP/PROPH/DIAG2ST, IM OR SUBCUT: ICD-10-PCS | Mod: ,,, | Performed by: NURSE PRACTITIONER

## 2023-12-28 PROCEDURE — 87804 INFLUENZA ASSAY W/OPTIC: CPT | Mod: 59,QW,, | Performed by: NURSE PRACTITIONER

## 2023-12-28 RX ORDER — KETOROLAC TROMETHAMINE 30 MG/ML
60 INJECTION, SOLUTION INTRAMUSCULAR; INTRAVENOUS
Status: COMPLETED | OUTPATIENT
Start: 2023-12-28 | End: 2023-12-28

## 2023-12-28 RX ORDER — AZITHROMYCIN 250 MG/1
TABLET, FILM COATED ORAL
Qty: 6 TABLET | Refills: 0 | Status: SHIPPED | OUTPATIENT
Start: 2023-12-28 | End: 2024-01-02

## 2023-12-28 RX ORDER — DEXCHLORPHENIRAMINE MALEATE, DEXTROMETHORPHAN HBR, PHENYLEPHRINE HCL 1; 10; 5 MG/5ML; MG/5ML; MG/5ML
10 SYRUP ORAL EVERY 6 HOURS PRN
Qty: 200 ML | Refills: 0 | Status: SHIPPED | OUTPATIENT
Start: 2023-12-28

## 2023-12-28 RX ORDER — CEFTRIAXONE 1 G/1
1 INJECTION, POWDER, FOR SOLUTION INTRAMUSCULAR; INTRAVENOUS
Status: COMPLETED | OUTPATIENT
Start: 2023-12-28 | End: 2023-12-28

## 2023-12-28 RX ADMIN — CEFTRIAXONE 1 G: 1 INJECTION, POWDER, FOR SOLUTION INTRAMUSCULAR; INTRAVENOUS at 10:12

## 2023-12-28 RX ADMIN — KETOROLAC TROMETHAMINE 60 MG: 30 INJECTION, SOLUTION INTRAMUSCULAR; INTRAVENOUS at 10:12

## 2023-12-28 NOTE — PROGRESS NOTES
NERY Oviedo   [facility]  [address] [place] [address]  [phone]      PATIENT NAME: Natali Claire  : 1970  DATE: 23  MRN: [id number]      Billing Provider: NERY Oviedo  Level of Service:   Patient PCP Information       Provider PCP Type    Herlinda Louis MD General            Reason for Visit / Chief Complaint: Sore Throat (Said her tonsils were hot and aching all night. )    History of Present Illness / Problem Focused Workflow     Natali Claire presents to the clinic with Sore Throat (Said her tonsils were hot and aching all night. )     PP with c/o fairly acute onset of body aches, headache, nasal congestion, swollen tonsils that started 2 days ago.    Sore Throat   Associated symptoms include congestion and coughing. Pertinent negatives include no ear pain, shortness of breath or vomiting.       Review of Systems     Review of Systems   Constitutional:  Positive for activity change, chills, fatigue and fever. Negative for appetite change and diaphoresis.   HENT:  Positive for congestion, postnasal drip, sinus pressure and sore throat. Negative for ear pain and rhinorrhea.    Respiratory:  Positive for cough. Negative for shortness of breath.    Cardiovascular:  Negative for chest pain.   Gastrointestinal:  Negative for nausea and vomiting.   Musculoskeletal:  Positive for arthralgias and myalgias.   Skin: Negative.    Allergic/Immunologic: Negative for environmental allergies and food allergies.   Neurological:  Negative for dizziness and weakness.   Hematological:  Does not bruise/bleed easily.   Psychiatric/Behavioral:  Negative for confusion and sleep disturbance.        Medical / Social / Family History     Past Medical History:   Diagnosis Date    Anxiety     Arthritis     Chronic low back pain     Chronic pain syndrome     Degenerative joint disease involving multiple joints     Diabetes  mellitus, type 2     GERD (gastroesophageal reflux disease)     Hyperlipidemia     Hypertension     Lumbar radiculopathy     Lumbosacral spondylosis     Multiple joint pain     Onychomycosis     Osteoarthritis     Other long term (current) drug therapy     Rheumatoid arthritis        Past Surgical History:   Procedure Laterality Date    BREAST SURGERY      CHOLECYSTECTOMY      TUBAL LIGATION         Social History  Ms.  reports that she has never smoked. She has never been exposed to tobacco smoke. She has never used smokeless tobacco. She reports that she does not currently use alcohol. She reports that she does not use drugs.    Family History  Ms.'s family history includes Breast cancer in her paternal cousin; Diabetes in her mother and sister; Heart disease in her father.       Health Maintenance  Health Maintenance Due   Topic Date Due    Hepatitis C Screening  Never done    Diabetes Urine Screening  Never done    Foot Exam  Never done    HIV Screening  Never done    TETANUS VACCINE  Never done    Shingles Vaccine (1 of 2) Never done    Colorectal Cancer Screening  Never done    Pneumococcal Vaccines (Age 0-64) (2 - PPSV23 or PCV20) 01/10/2018    Influenza Vaccine (1) 09/01/2023    COVID-19 Vaccine (4 - 2023-24 season) 09/01/2023    Hemoglobin A1c  09/14/2023     Health Maintenance Topics with due status: Not Due       Topic Last Completion Date    Eye Exam 04/04/2023    Lipid Panel 06/14/2023    Low Dose Statin 10/04/2023    Mammogram 10/24/2023       Medications and Allergies     Medications  No outpatient medications have been marked as taking for the 12/28/23 encounter (Office Visit) with Nikki Moralez FNP.     Current Facility-Administered Medications for the 12/28/23 encounter (Office Visit) with Nikki Moralez FNP   Medication Dose Route Frequency Provider Last Rate Last Admin    cefTRIAXone injection 1 g  1 g Intramuscular 1 time in Clinic/HOD Nikki Moralez FNP        ketorolac  injection 60 mg  60 mg Intramuscular 1 time in Clinic/HOD Nikki Moralez FNP           Allergies  Review of patient's allergies indicates:  No Known Allergies    Physical Examination     Vitals:    12/28/23 0846   BP: 115/82   Pulse: 82   Resp: 18   Temp: 97.6 °F (36.4 °C)     Physical Exam  Constitutional:       Appearance: Normal appearance.   HENT:      Head: Normocephalic.      Right Ear: Tympanic membrane normal.      Left Ear: Tympanic membrane normal.      Nose: Congestion present.      Mouth/Throat:      Mouth: Mucous membranes are moist.      Comments: Mild erythema/ drainage to POP.   Cardiovascular:      Rate and Rhythm: Normal rate and regular rhythm.      Heart sounds: Normal heart sounds.   Pulmonary:      Effort: Pulmonary effort is normal. No respiratory distress.      Breath sounds: Normal breath sounds. No wheezing, rhonchi or rales.   Musculoskeletal:         General: Normal range of motion.   Skin:     General: Skin is warm and dry.   Neurological:      General: No focal deficit present.      Mental Status: She is alert and oriented to person, place, and time.         Assessment and Plan     :1. Generalized body aches    -     POCT Influenza A/B  -     azithromycin (Z-AMARJIT) 250 MG tablet; Take 2 tablets by mouth on day 1; Take 1 tablet by mouth on days 2-5  Dispense: 6 tablet; Refill: 0    2. Upper respiratory tract infection, unspecified type    -     dexchlorphen-phenylephrine-DM (POLYTUSSIN DM,DEXCHLORPHENRMN,) 1-5-10 mg/5 mL Syrp; Take 10 mLs by mouth every 6 (six) hours as needed.  Dispense: 200 mL; Refill: 0  -     cefTRIAXone injection 1 g  -     ketorolac injection 60 mg             Future Appointments   Date Time Provider Department Center   2/1/2024  2:30 PM Stefan Mcgee PA [facility]   2/15/2024 10:00 AM [facility] NURSE, [facility] PRIMARY CARE [facility] CRISTIAN Maurer    10/29/2024 11:20 AM [facility]            Signature:  Nikki Moralez  FNP [facility]  [address] [place] [address]  [place] [id number]  [phone]    Date of encounter: 12/28/23

## 2023-12-31 ENCOUNTER — EXTERNAL CHRONIC CARE MANAGEMENT (OUTPATIENT)
Dept: PRIMARY CARE CLINIC | Facility: CLINIC | Age: 53
End: 2023-12-31
Payer: MEDICARE

## 2023-12-31 PROCEDURE — G0511 CCM/BHI BY RHC/FQHC 20MIN MO: HCPCS | Mod: ,,, | Performed by: FAMILY MEDICINE

## 2024-01-02 DIAGNOSIS — E11.9 TYPE 2 DIABETES MELLITUS WITHOUT COMPLICATION, WITHOUT LONG-TERM CURRENT USE OF INSULIN: ICD-10-CM

## 2024-01-03 RX ORDER — LORATADINE 10 MG/1
10 TABLET ORAL
Qty: 90 TABLET | Refills: 1 | Status: SHIPPED | OUTPATIENT
Start: 2024-01-03

## 2024-01-30 NOTE — PROGRESS NOTES
Subjective:         Patient ID: Natali Claire is a 53 y.o. female.    Chief Complaint: Shoulder Pain, Low-back Pain, and Hip Pain (lefrt)        Pain  This is a chronic problem. The current episode started more than 1 year ago. The problem occurs daily. The problem has been unchanged. Associated symptoms include arthralgias and neck pain. Pertinent negatives include no anorexia, chest pain, chills, coughing, diaphoresis, fever, sore throat, vertigo or vomiting.     Review of Systems   Constitutional:  Negative for activity change, appetite change, chills, diaphoresis, fever and unexpected weight change.   HENT:  Negative for drooling, ear discharge, ear pain, facial swelling, nosebleeds, sore throat, trouble swallowing, voice change and goiter.    Eyes:  Negative for photophobia, pain, discharge, redness and visual disturbance.   Respiratory:  Negative for apnea, cough, choking, chest tightness, shortness of breath, wheezing and stridor.    Cardiovascular:  Negative for chest pain, palpitations and leg swelling.   Gastrointestinal:  Negative for abdominal distention, anorexia, diarrhea, rectal pain, vomiting and fecal incontinence.   Endocrine: Negative for cold intolerance, heat intolerance, polydipsia, polyphagia and polyuria.   Genitourinary:  Negative for bladder incontinence, dysuria, flank pain, frequency and hot flashes.   Musculoskeletal:  Positive for arthralgias, back pain, leg pain and neck pain.   Integumentary:  Negative for color change and pallor.   Allergic/Immunologic: Negative for immunocompromised state.   Neurological:  Negative for dizziness, vertigo, seizures, syncope, facial asymmetry, speech difficulty, light-headedness, memory loss and coordination difficulties.   Hematological:  Negative for adenopathy. Does not bruise/bleed easily.   Psychiatric/Behavioral:  Negative for agitation, behavioral problems, confusion, decreased concentration, dysphoric mood, hallucinations, self-injury and  "suicidal ideas. The patient is not nervous/anxious and is not hyperactive.            Past Medical History:   Diagnosis Date    Anxiety     Arthritis     Chronic low back pain     Chronic pain syndrome     Degenerative joint disease involving multiple joints     Diabetes mellitus, type 2     GERD (gastroesophageal reflux disease)     Hyperlipidemia     Hypertension     Lumbar radiculopathy     Lumbosacral spondylosis     Multiple joint pain     Onychomycosis     Osteoarthritis     Other long term (current) drug therapy     Rheumatoid arthritis      Past Surgical History:   Procedure Laterality Date    BREAST SURGERY      CHOLECYSTECTOMY      TUBAL LIGATION       Social History     Socioeconomic History    Marital status: Single    Number of children: 2   Occupational History    Occupation: disable   Tobacco Use    Smoking status: Never     Passive exposure: Never    Smokeless tobacco: Never   Substance and Sexual Activity    Alcohol use: Not Currently    Drug use: Never    Sexual activity: Yes     Partners: Male     Family History   Problem Relation Age of Onset    Diabetes Mother     Heart disease Father     Diabetes Sister     Breast cancer Paternal Cousin      Review of patient's allergies indicates:  No Known Allergies     Objective:  Vitals:    01/31/24 1310   BP: (!) 154/82   Pulse: 61   Resp: 20   Weight: (!) 137.9 kg (304 lb)   Height: 5' 9" (1.753 m)   PainSc:   3           Physical Exam  Vitals and nursing note reviewed. Exam conducted with a chaperone present.   Constitutional:       General: She is awake. She is not in acute distress.     Appearance: Normal appearance. She is not ill-appearing, toxic-appearing or diaphoretic.   HENT:      Head: Normocephalic and atraumatic.      Nose: Nose normal.      Mouth/Throat:      Mouth: Mucous membranes are moist.      Pharynx: Oropharynx is clear.   Eyes:      Conjunctiva/sclera: Conjunctivae normal.      Pupils: Pupils are equal, round, and reactive to light. "   Cardiovascular:      Rate and Rhythm: Normal rate.   Pulmonary:      Effort: Pulmonary effort is normal. No respiratory distress.   Abdominal:      Palpations: Abdomen is soft.   Musculoskeletal:         General: Normal range of motion.      Cervical back: Normal range of motion and neck supple. Tenderness present.      Thoracic back: Tenderness present.      Lumbar back: Tenderness present.   Skin:     General: Skin is warm and dry.   Neurological:      General: No focal deficit present.      Mental Status: She is alert and oriented to person, place, and time. Mental status is at baseline.      Cranial Nerves: No cranial nerve deficit (II-XII).   Psychiatric:         Mood and Affect: Mood normal.         Behavior: Behavior normal. Behavior is cooperative.         Thought Content: Thought content normal.           Mammo Digital Screening Bilat w/ Kael  Narrative: Result:   Mammo Digital Screening Bilat w/ Kael     History:  Patient is 53 y.o. and is seen for a screening mammogram.    Films Compared:  Compared to: 10/18/2022 Mammo Digital Screening Bilat, 10/26/2021 Mammo   Digital Diagnostic Left, and 10/05/2021 Mammo Digital Screening Bilat     Findings:  This procedure was performed using tomosynthesis. Computer-aided detection   was utilized in the interpretation of this examination.  The breasts have scattered areas of fibroglandular density. There is no   evidence of suspicious masses, calcifications, or other abnormal findings.  Impression: Bilateral  There is no mammographic evidence of malignancy.    BI-RADS Category:   Overall: 2 - Benign       Recommendation:  Routine screening mammogram in 1 year is recommended.    Your estimated lifetime risk of breast cancer (to age 85) based on   Tyrer-Cuzick risk assessment model is Tyrer-Cuzick: 7.15 %. According to   the [employer], patients with a lifetime breast cancer risk   of 20% or higher might benefit from supplemental screening tests.          Office Visit on 12/28/2023   Component Date Value Ref Range Status    Rapid Influenza A Ag 12/28/2023 Negative  Negative Final    Rapid Influenza B Ag 12/28/2023 Negative  Negative Final     Acceptable 12/28/2023 Yes   Final   Office Visit on 12/04/2023   Component Date Value Ref Range Status    POC Amphetamines 12/04/2023 Negative  Negative, Inconclusive Final    POC Barbiturates 12/04/2023 Negative  Negative, Inconclusive Final    POC Benzodiazepines 12/04/2023 Negative  Negative, Inconclusive Final    POC Cocaine 12/04/2023 Negative  Negative, Inconclusive Final    POC THC 12/04/2023 Negative  Negative, Inconclusive Final    POC Methadone 12/04/2023 Negative  Negative, Inconclusive Final    POC Methamphetamine 12/04/2023 Negative  Negative, Inconclusive Final    POC Opiates 12/04/2023 Presumptive Positive (A)  Negative, Inconclusive Final    POC Oxycodone 12/04/2023 Negative  Negative, Inconclusive Final    POC Phencyclidine 12/04/2023 Negative  Negative, Inconclusive Final    POC Methylenedioxymethamphetamine * 12/04/2023 Negative  Negative, Inconclusive Final    POC Tricyclic Antidepressants 12/04/2023 Negative  Negative, Inconclusive Final    POC Buprenorphine 12/04/2023 Negative   Final     Acceptable 12/04/2023 Yes   Final    POC Temperature (Urine) 12/04/2023 90   Final   Office Visit on 08/01/2023   Component Date Value Ref Range Status    POC Amphetamines 08/01/2023 Negative  Negative, Inconclusive Final    POC Barbiturates 08/01/2023 Negative  Negative, Inconclusive Final    POC Benzodiazepines 08/01/2023 Negative  Negative, Inconclusive Final    POC Cocaine 08/01/2023 Negative  Negative, Inconclusive Final    POC THC 08/01/2023 Negative  Negative, Inconclusive Final    POC Methadone 08/01/2023 Negative  Negative, Inconclusive Final    POC Methamphetamine 08/01/2023 Negative  Negative, Inconclusive Final    POC Opiates 08/01/2023 Presumptive Positive (A)  Negative,  Inconclusive Final    POC Oxycodone 08/01/2023 Negative  Negative, Inconclusive Final    POC Phencyclidine 08/01/2023 Inconclusive  Negative, Inconclusive Final    POC Methylenedioxymethamphetamine * 08/01/2023 Inconclusive  Negative, Inconclusive Final    POC Tricyclic Antidepressants 08/01/2023 Negative  Negative, Inconclusive Final    POC Buprenorphine 08/01/2023 Negative   Final     Acceptable 08/01/2023 Yes   Final    POC Temperature (Urine) 08/01/2023 92   Final         No orders of the defined types were placed in this encounter.        Requested Prescriptions     Pending Prescriptions Disp Refills    HYDROcodone-acetaminophen (NORCO)  mg per tablet 60 tablet 0     Sig: Take 1 tablet by mouth every 12 (twelve) hours as needed for Pain.    HYDROcodone-acetaminophen (NORCO)  mg per tablet 60 tablet 0     Sig: Take 1 tablet by mouth every 12 (twelve) hours as needed for Pain.       Assessment:     1. Rheumatoid arthritis involving multiple sites with positive rheumatoid factor    2. Lumbosacral spondylosis without myelopathy           A's of Opioid Risk Assessment  Activity:Patient can perform ADL.   Analgesia:Patients pain is partially controlled by current medication. Patient has tried OTC medications such as Tylenol and Ibuprofen with out relief.   Adverse Effects: Patient denies constipation or sedation.  Aberrant Behavior:  reviewed with no aberrant drug seeking/taking behavior.  Overdose reversal drug naloxone discussed    Drug screen reviewed      X-ray lumbar spine [facility] November 11, 2020, multiple level degenerative changes no instability noted with flexion-extension no fracture noted      Plan:     Nellie Morgan February 2023    She picked up tramadol from primary care provider March 2, 2023  This prescription was destroyed    She verbalizes if this happens again she will no longer receive narcotics [facility]    Follows rheumatology  rheumatoid arthritis [facility]    No MRI lumbar spine in [facility] or in [facility]    She states she is doing very well current medication is helping control her discomfort she has no new complaints    She would like to continue current treatment plan    Continue home exercise program as directed    Continue current medication    Follow-up 2 months    Dr. Claire, October 2024    Bring original prescription medication bottles/container/box with labels to each visit

## 2024-01-31 ENCOUNTER — OFFICE VISIT (OUTPATIENT)
Dept: PAIN MEDICINE | Facility: CLINIC | Age: 54
End: 2024-01-31
Payer: MEDICARE

## 2024-01-31 ENCOUNTER — EXTERNAL CHRONIC CARE MANAGEMENT (OUTPATIENT)
Dept: PRIMARY CARE CLINIC | Facility: CLINIC | Age: 54
End: 2024-01-31
Payer: MEDICARE

## 2024-01-31 VITALS
WEIGHT: 293 LBS | DIASTOLIC BLOOD PRESSURE: 82 MMHG | HEART RATE: 61 BPM | BODY MASS INDEX: 43.4 KG/M2 | SYSTOLIC BLOOD PRESSURE: 154 MMHG | RESPIRATION RATE: 20 BRPM | HEIGHT: 69 IN

## 2024-01-31 DIAGNOSIS — M47.817 LUMBOSACRAL SPONDYLOSIS WITHOUT MYELOPATHY: Chronic | ICD-10-CM

## 2024-01-31 DIAGNOSIS — M05.79 RHEUMATOID ARTHRITIS INVOLVING MULTIPLE SITES WITH POSITIVE RHEUMATOID FACTOR: Primary | Chronic | ICD-10-CM

## 2024-01-31 PROCEDURE — 3008F BODY MASS INDEX DOCD: CPT | Mod: CPTII,,, | Performed by: PHYSICIAN ASSISTANT

## 2024-01-31 PROCEDURE — 1159F MED LIST DOCD IN RCRD: CPT | Mod: CPTII,,, | Performed by: PHYSICIAN ASSISTANT

## 2024-01-31 PROCEDURE — 3079F DIAST BP 80-89 MM HG: CPT | Mod: CPTII,,, | Performed by: PHYSICIAN ASSISTANT

## 2024-01-31 PROCEDURE — 99215 OFFICE O/P EST HI 40 MIN: CPT | Mod: PBBFAC | Performed by: PHYSICIAN ASSISTANT

## 2024-01-31 PROCEDURE — 99214 OFFICE O/P EST MOD 30 MIN: CPT | Mod: S$PBB,,, | Performed by: PHYSICIAN ASSISTANT

## 2024-01-31 PROCEDURE — G0511 CCM/BHI BY RHC/FQHC 20MIN MO: HCPCS | Mod: ,,, | Performed by: FAMILY MEDICINE

## 2024-01-31 PROCEDURE — 3077F SYST BP >= 140 MM HG: CPT | Mod: CPTII,,, | Performed by: PHYSICIAN ASSISTANT

## 2024-01-31 RX ORDER — HYDROCODONE BITARTRATE AND ACETAMINOPHEN 10; 325 MG/1; MG/1
1 TABLET ORAL EVERY 12 HOURS PRN
Qty: 60 TABLET | Refills: 0 | Status: SHIPPED | OUTPATIENT
Start: 2024-03-08 | End: 2024-04-01 | Stop reason: SDUPTHER

## 2024-01-31 RX ORDER — HYDROCODONE BITARTRATE AND ACETAMINOPHEN 10; 325 MG/1; MG/1
1 TABLET ORAL EVERY 12 HOURS PRN
Qty: 60 TABLET | Refills: 0 | Status: SHIPPED | OUTPATIENT
Start: 2024-02-07 | End: 2024-03-08

## 2024-02-21 ENCOUNTER — CLINICAL SUPPORT (OUTPATIENT)
Dept: PRIMARY CARE CLINIC | Facility: CLINIC | Age: 54
End: 2024-02-21
Payer: MEDICARE

## 2024-02-21 DIAGNOSIS — I10 HYPERTENSION, UNSPECIFIED TYPE: ICD-10-CM

## 2024-02-21 DIAGNOSIS — M62.838 MUSCLE SPASM: ICD-10-CM

## 2024-02-21 DIAGNOSIS — M19.90 ARTHRITIS: Primary | ICD-10-CM

## 2024-02-21 PROCEDURE — 96372 THER/PROPH/DIAG INJ SC/IM: CPT | Mod: ,,, | Performed by: FAMILY MEDICINE

## 2024-02-21 RX ORDER — KETOROLAC TROMETHAMINE 30 MG/ML
60 INJECTION, SOLUTION INTRAMUSCULAR; INTRAVENOUS
Status: COMPLETED | OUTPATIENT
Start: 2024-02-21 | End: 2024-02-21

## 2024-02-21 RX ORDER — ORPHENADRINE CITRATE 30 MG/ML
60 INJECTION INTRAMUSCULAR; INTRAVENOUS
Status: COMPLETED | OUTPATIENT
Start: 2024-02-21 | End: 2024-02-21

## 2024-02-21 RX ORDER — METOPROLOL TARTRATE 50 MG/1
TABLET ORAL
Qty: 180 TABLET | Refills: 3 | Status: SHIPPED | OUTPATIENT
Start: 2024-02-21 | End: 2024-05-06 | Stop reason: SDUPTHER

## 2024-02-21 RX ADMIN — KETOROLAC TROMETHAMINE 60 MG: 30 INJECTION, SOLUTION INTRAMUSCULAR; INTRAVENOUS at 08:02

## 2024-02-21 RX ADMIN — ORPHENADRINE CITRATE 60 MG: 30 INJECTION INTRAMUSCULAR; INTRAVENOUS at 08:02

## 2024-02-21 NOTE — PROGRESS NOTES
Patient in today complaining of arthritis pain and muscle spasms and requesting injections. V.O. approval per Dr. Louis.

## 2024-02-22 DIAGNOSIS — E11.9 TYPE 2 DIABETES MELLITUS WITHOUT COMPLICATION, WITHOUT LONG-TERM CURRENT USE OF INSULIN: ICD-10-CM

## 2024-02-26 RX ORDER — MONTELUKAST SODIUM 10 MG/1
10 TABLET ORAL NIGHTLY
Qty: 30 TABLET | Refills: 5 | Status: SHIPPED | OUTPATIENT
Start: 2024-02-26 | End: 2024-05-06 | Stop reason: SDUPTHER

## 2024-02-29 ENCOUNTER — EXTERNAL CHRONIC CARE MANAGEMENT (OUTPATIENT)
Dept: PRIMARY CARE CLINIC | Facility: CLINIC | Age: 54
End: 2024-02-29
Payer: MEDICARE

## 2024-02-29 PROCEDURE — G0511 CCM/BHI BY RHC/FQHC 20MIN MO: HCPCS | Mod: ICN,,, | Performed by: FAMILY MEDICINE

## 2024-03-06 ENCOUNTER — TELEPHONE (OUTPATIENT)
Dept: OBSTETRICS AND GYNECOLOGY | Facility: CLINIC | Age: 54
End: 2024-03-06
Payer: COMMERCIAL

## 2024-03-06 ENCOUNTER — OFFICE VISIT (OUTPATIENT)
Dept: OBSTETRICS AND GYNECOLOGY | Facility: CLINIC | Age: 54
End: 2024-03-06
Payer: COMMERCIAL

## 2024-03-06 ENCOUNTER — HOSPITAL ENCOUNTER (OUTPATIENT)
Dept: RADIOLOGY | Facility: HOSPITAL | Age: 54
Discharge: HOME | End: 2024-03-06
Attending: OBSTETRICS & GYNECOLOGY
Payer: COMMERCIAL

## 2024-03-06 VITALS
HEIGHT: 66 IN | SYSTOLIC BLOOD PRESSURE: 109 MMHG | WEIGHT: 191.38 LBS | BODY MASS INDEX: 30.76 KG/M2 | DIASTOLIC BLOOD PRESSURE: 68 MMHG

## 2024-03-06 DIAGNOSIS — Z12.31 VISIT FOR SCREENING MAMMOGRAM: ICD-10-CM

## 2024-03-06 DIAGNOSIS — Z12.31 VISIT FOR SCREENING MAMMOGRAM: Primary | ICD-10-CM

## 2024-03-06 DIAGNOSIS — Z01.419 GYNECOLOGIC EXAM NORMAL: ICD-10-CM

## 2024-03-06 PROCEDURE — 99396 PREV VISIT EST AGE 40-64: CPT | Mod: S$GLB,,, | Performed by: OBSTETRICS & GYNECOLOGY

## 2024-03-06 PROCEDURE — 77063 BREAST TOMOSYNTHESIS BI: CPT | Mod: 26,,, | Performed by: RADIOLOGY

## 2024-03-06 PROCEDURE — 99999 PR PBB SHADOW E&M-EST. PATIENT-LVL III: CPT | Mod: PBBFAC,,, | Performed by: OBSTETRICS & GYNECOLOGY

## 2024-03-06 PROCEDURE — 1160F RVW MEDS BY RX/DR IN RCRD: CPT | Mod: CPTII,S$GLB,, | Performed by: OBSTETRICS & GYNECOLOGY

## 2024-03-06 PROCEDURE — 77067 SCR MAMMO BI INCL CAD: CPT | Mod: 26,,, | Performed by: RADIOLOGY

## 2024-03-06 PROCEDURE — 88175 CYTOPATH C/V AUTO FLUID REDO: CPT | Performed by: OBSTETRICS & GYNECOLOGY

## 2024-03-06 PROCEDURE — 77067 SCR MAMMO BI INCL CAD: CPT | Mod: TC,PN

## 2024-03-06 PROCEDURE — 3008F BODY MASS INDEX DOCD: CPT | Mod: CPTII,S$GLB,, | Performed by: OBSTETRICS & GYNECOLOGY

## 2024-03-06 PROCEDURE — 3074F SYST BP LT 130 MM HG: CPT | Mod: CPTII,S$GLB,, | Performed by: OBSTETRICS & GYNECOLOGY

## 2024-03-06 PROCEDURE — 1159F MED LIST DOCD IN RCRD: CPT | Mod: CPTII,S$GLB,, | Performed by: OBSTETRICS & GYNECOLOGY

## 2024-03-06 PROCEDURE — 3078F DIAST BP <80 MM HG: CPT | Mod: CPTII,S$GLB,, | Performed by: OBSTETRICS & GYNECOLOGY

## 2024-03-06 NOTE — PROGRESS NOTES
Chief Complaint   Patient presents with    Well Woman       History and Physical:  Patient's last menstrual period was 2024 (approximate).       Matilda Decker is a 53 y.o.  -American Female who presents today for her routine annual GYN exam. The patient has no Gynecology complaints today. Reports menses monthly off of oral contraceptive pills premenopausal FSH last year. .         Allergies: Review of patient's allergies indicates:  No Known Allergies    Past Medical History:   Diagnosis Date    Abnormal Pap smear     repeat pap       Past Surgical History:   Procedure Laterality Date    CERVICAL BIOPSY  W/ LOOP ELECTRODE EXCISION      COLPOSCOPY         MEDS:   Current Outpatient Medications on File Prior to Visit   Medication Sig Dispense Refill    [DISCONTINUED] fexofenadine (ALLEGRA) 30 MG tablet Take 30 mg by mouth 2 (two) times daily.      [DISCONTINUED] L. ACIDOPHILUS/DIG ENZ CMB 5 (PROBIOTIC-DIGESTIVE ENZYMES ORAL) Take by mouth.      [DISCONTINUED] noreth-ethinyl estradioL-iron 0.4mg-35mcg(21) and 75 mg (7) Chew Take 1 tablet by mouth once daily. 90 each 0    [DISCONTINUED] norethindrone-ethinyl estradiol (MICROGESTIN /20) 1-20 mg-mcg per tablet TAKE 1 TABLET BY MOUTH EVERY DAY 90 tablet 3     No current facility-administered medications on file prior to visit.       OB History          2    Para   2    Term   1            AB        Living             SAB        IAB        Ectopic        Multiple        Live Births                     Social History     Socioeconomic History    Marital status:    Tobacco Use    Smoking status: Never    Smokeless tobacco: Never   Substance and Sexual Activity    Alcohol use: No    Drug use: No    Sexual activity: Not Currently     Partners: Male     Birth control/protection: OCP       Family History   Problem Relation Age of Onset    Breast cancer Neg Hx     Ovarian cancer Neg Hx          Past medical and surgical history  "reviewed.   I have reviewed the patient's medical history in detail and updated the computerized patient record.    Review of System:   General: no chills, fever, night sweats, weight gain or weight loss  Psychological: no depression or suicidal ideation  Breasts: no new or changing breast lumps, nipple discharge or masses.  Respiratory: no cough, shortness of breath, or wheezing  Cardiovascular: no chest pain or dyspnea on exertion  Gastrointestinal: no abdominal pain, change in bowel habits, or black or bloody stools  Genito-Urinary: no incontinence, urinary frequency/urgency or vulvar/vaginal symptoms, pelvic pain or abnormal vaginal bleeding.  Musculoskeletal: no gait disturbance or muscular weakness      Physical Exam:   /68   Ht 5' 6" (1.676 m)   Wt 86.8 kg (191 lb 5.8 oz)   LMP 02/08/2024 (Approximate)   BMI 30.89 kg/m²   Constitutional: She appears alert and responsive. She appears well-developed, well-groomed, and well-nourished. No distress. OverWeight   HENT:   Head: Normocephalic and atraumatic.   Eyes: Conjunctivae and EOM are normal. No scleral icterus.   Neck: Symmetrical. Normal range of motion. Neck supple. No tracheal deviation present.   Cardiovascular: Normal rate, no rhythm abnormality noted. Extremities without swelling or edema, warm.    Pulmonary/Chest: Normal respiratory Effort. No distress or retractions. She exhibits no tenderness.  Breasts: are symmetrical.   Right breast exhibits no inverted nipple, no mass, no nipple discharge, no skin change and no tenderness.   Left breast exhibits no inverted nipple, no mass, no nipple discharge, no skin change and no tenderness.  Abdominal: Soft. She exhibits no distension, hernias or masses. There is no tenderness. No enlargement of liver edge or spleen.  There is no rebound and no guarding.   Genitourinary:    External rectal exam shows no thrombosed external hemorrhoids, no lesions.     Pelvic exam was performed with patient supine.   " No labial fusion, and symmetrical.    There is no rash, lesion or injury on the right labia.   There is no rash, lesion or injury on the left labia.   No bleeding and no signs of injury around the vaginal introitus, urethral meatus is normal size and without prolapse or lesions, urethra well supported. The cervix is visualized with no discharge, lesions or friability.   No vaginal discharge found.    No significant Cystocele, Enterocele or rectocele, and cervix and uterus well supported.   Bimanual exam:   The urethra is normal to palpation and there are no palpable vaginal wall masses.   Uterus is not deviated, not enlarged, not fixed, normal shape and not tender.   Cervix exhibits no motion tenderness.    Right adnexum displays no mass or nodularity and no tenderness.   Left adnexum displays no mass or nodularity and no tenderness.  Musculoskeletal: Normal range of motion.   Lymphadenopathy: No inguinal adenopathy present.   Neurological: She is alert and oriented to person, place, and time. Coordination normal.   Skin: Skin is warm and dry. She is not diaphoretic. No rashes, lesions or ulcers.   Psychiatric: She has a normal mood and affect, oriented to person, place, and time.      Assessment:   Normal annual GYN exam  1. Visit for screening mammogram  Mammo Digital Screening Bilat w/ Dennis        Plan:   PAP  Mammogram  Follow up in 1 year.  Patient informed will be contacted with results within 2 weeks. Encouraged to please call back or email if she has not heard from us by then.

## 2024-03-06 NOTE — TELEPHONE ENCOUNTER
----- Message from Osiel Noriega sent at 3/6/2024  9:27 AM CST -----  Type: Needs Medical Advice  Who Called:  pt  Best Call Back Number: [phone]  Additional Information: pt is running 5-10 mins late, pl call bk to advise thanks

## 2024-03-07 ENCOUNTER — PATIENT MESSAGE (OUTPATIENT)
Dept: OBSTETRICS AND GYNECOLOGY | Facility: CLINIC | Age: 54
End: 2024-03-07
Payer: COMMERCIAL

## 2024-03-13 ENCOUNTER — PATIENT MESSAGE (OUTPATIENT)
Dept: OBSTETRICS AND GYNECOLOGY | Facility: CLINIC | Age: 54
End: 2024-03-13
Payer: COMMERCIAL

## 2024-03-13 DIAGNOSIS — K21.9 GASTROESOPHAGEAL REFLUX DISEASE WITHOUT ESOPHAGITIS: ICD-10-CM

## 2024-03-13 LAB
FINAL PATHOLOGIC DIAGNOSIS: NORMAL
Lab: NORMAL

## 2024-03-13 RX ORDER — ESOMEPRAZOLE MAGNESIUM 40 MG/1
40 CAPSULE, DELAYED RELEASE ORAL
Qty: 90 CAPSULE | Refills: 0 | Status: SHIPPED | OUTPATIENT
Start: 2024-03-13 | End: 2025-03-13

## 2024-03-20 DIAGNOSIS — M05.79 RHEUMATOID ARTHRITIS INVOLVING MULTIPLE SITES WITH POSITIVE RHEUMATOID FACTOR: ICD-10-CM

## 2024-03-20 RX ORDER — NABUMETONE 750 MG/1
TABLET, FILM COATED ORAL
Qty: 60 TABLET | Refills: 0 | Status: SHIPPED | OUTPATIENT
Start: 2024-03-20 | End: 2024-05-06 | Stop reason: SDUPTHER

## 2024-03-31 ENCOUNTER — EXTERNAL CHRONIC CARE MANAGEMENT (OUTPATIENT)
Dept: PRIMARY CARE CLINIC | Facility: CLINIC | Age: 54
End: 2024-03-31
Payer: MEDICARE

## 2024-03-31 PROCEDURE — G0511 CCM/BHI BY RHC/FQHC 20MIN MO: HCPCS | Mod: ,,, | Performed by: FAMILY MEDICINE

## 2024-04-01 NOTE — PROGRESS NOTES
Subjective:         Patient ID: Natali Claire is a 53 y.o. female.    Chief Complaint: Shoulder Pain        Pain  This is a chronic problem. The current episode started more than 1 year ago. The problem occurs daily. The problem has been waxing and waning. Associated symptoms include arthralgias and neck pain. Pertinent negatives include no anorexia, chest pain, chills, coughing, diaphoresis, fever, sore throat, vertigo or vomiting.     Review of Systems   Constitutional:  Negative for activity change, appetite change, chills, diaphoresis, fever and unexpected weight change.   HENT:  Negative for drooling, ear discharge, ear pain, facial swelling, nosebleeds, sore throat, trouble swallowing, voice change and goiter.    Eyes:  Negative for photophobia, pain, discharge, redness and visual disturbance.   Respiratory:  Negative for apnea, cough, choking, chest tightness, shortness of breath, wheezing and stridor.    Cardiovascular:  Negative for chest pain, palpitations and leg swelling.   Gastrointestinal:  Negative for abdominal distention, anorexia, diarrhea, rectal pain, vomiting and fecal incontinence.   Endocrine: Negative for cold intolerance, heat intolerance, polydipsia, polyphagia and polyuria.   Genitourinary:  Negative for bladder incontinence, dysuria, flank pain, frequency and hot flashes.   Musculoskeletal:  Positive for arthralgias, back pain, leg pain and neck pain.   Integumentary:  Negative for color change and pallor.   Allergic/Immunologic: Negative for immunocompromised state.   Neurological:  Negative for dizziness, vertigo, seizures, syncope, facial asymmetry, speech difficulty, light-headedness, memory loss and coordination difficulties.   Hematological:  Negative for adenopathy. Does not bruise/bleed easily.   Psychiatric/Behavioral:  Negative for agitation, behavioral problems, confusion, decreased concentration, dysphoric mood, hallucinations, self-injury and suicidal ideas. The patient is  "not nervous/anxious and is not hyperactive.            Past Medical History:   Diagnosis Date    Anxiety     Arthritis     Chronic low back pain     Chronic pain syndrome     Degenerative joint disease involving multiple joints     Diabetes mellitus, type 2     GERD (gastroesophageal reflux disease)     Hyperlipidemia     Hypertension     Lumbar radiculopathy     Lumbosacral spondylosis     Multiple joint pain     Onychomycosis     Osteoarthritis     Other long term (current) drug therapy     Rheumatoid arthritis      Past Surgical History:   Procedure Laterality Date    BREAST SURGERY      CHOLECYSTECTOMY      TUBAL LIGATION       Social History     Socioeconomic History    Marital status: Single    Number of children: 2   Occupational History    Occupation: disable   Tobacco Use    Smoking status: Never     Passive exposure: Never    Smokeless tobacco: Never   Substance and Sexual Activity    Alcohol use: Not Currently    Drug use: Never    Sexual activity: Yes     Partners: Male     Family History   Problem Relation Age of Onset    Diabetes Mother     Heart disease Father     Diabetes Sister     Breast cancer Paternal Cousin      Review of patient's allergies indicates:  No Known Allergies     Objective:  Vitals:    04/02/24 1323   BP: (!) 145/72   Pulse: 64   Resp: 18   Weight: (!) 137 kg (302 lb)   Height: 5' 9" (1.753 m)   PainSc:   5           Physical Exam  Vitals and nursing note reviewed. Exam conducted with a chaperone present.   Constitutional:       General: She is awake. She is not in acute distress.     Appearance: Normal appearance. She is not ill-appearing, toxic-appearing or diaphoretic.   HENT:      Head: Normocephalic and atraumatic.      Nose: Nose normal.      Mouth/Throat:      Mouth: Mucous membranes are moist.      Pharynx: Oropharynx is clear.   Eyes:      Conjunctiva/sclera: Conjunctivae normal.      Pupils: Pupils are equal, round, and reactive to light.   Cardiovascular:      Rate and " Rhythm: Normal rate.   Pulmonary:      Effort: Pulmonary effort is normal. No respiratory distress.   Abdominal:      Palpations: Abdomen is soft.   Musculoskeletal:         General: Normal range of motion.      Cervical back: Normal range of motion and neck supple. Tenderness present.      Thoracic back: Tenderness present.      Lumbar back: Tenderness present.   Skin:     General: Skin is warm and dry.   Neurological:      General: No focal deficit present.      Mental Status: She is alert and oriented to person, place, and time. Mental status is at baseline.      Cranial Nerves: No cranial nerve deficit (II-XII).   Psychiatric:         Mood and Affect: Mood normal.         Behavior: Behavior normal. Behavior is cooperative.         Thought Content: Thought content normal.           Mammo Digital Screening Bilat w/ Kael  Narrative: Result:   Mammo Digital Screening Bilat w/ Kael     History:  Patient is 53 y.o. and is seen for a screening mammogram.    Films Compared:  Compared to: 10/18/2022 Mammo Digital Screening Bilat, 10/26/2021 Mammo   Digital Diagnostic Left, and 10/05/2021 Mammo Digital Screening Bilat     Findings:  This procedure was performed using tomosynthesis. Computer-aided detection   was utilized in the interpretation of this examination.  The breasts have scattered areas of fibroglandular density. There is no   evidence of suspicious masses, calcifications, or other abnormal findings.  Impression: Bilateral  There is no mammographic evidence of malignancy.    BI-RADS Category:   Overall: 2 - Benign       Recommendation:  Routine screening mammogram in 1 year is recommended.    Your estimated lifetime risk of breast cancer (to age 85) based on   Tyrer-Cuzick risk assessment model is Tyrer-Cuzick: 7.15 %. According to   the [employer], patients with a lifetime breast cancer risk   of 20% or higher might benefit from supplemental screening tests.         Office Visit on 12/28/2023    Component Date Value Ref Range Status    Rapid Influenza A Ag 12/28/2023 Negative  Negative Final    Rapid Influenza B Ag 12/28/2023 Negative  Negative Final     Acceptable 12/28/2023 Yes   Final   Office Visit on 12/04/2023   Component Date Value Ref Range Status    POC Amphetamines 12/04/2023 Negative  Negative, Inconclusive Final    POC Barbiturates 12/04/2023 Negative  Negative, Inconclusive Final    POC Benzodiazepines 12/04/2023 Negative  Negative, Inconclusive Final    POC Cocaine 12/04/2023 Negative  Negative, Inconclusive Final    POC THC 12/04/2023 Negative  Negative, Inconclusive Final    POC Methadone 12/04/2023 Negative  Negative, Inconclusive Final    POC Methamphetamine 12/04/2023 Negative  Negative, Inconclusive Final    POC Opiates 12/04/2023 Presumptive Positive (A)  Negative, Inconclusive Final    POC Oxycodone 12/04/2023 Negative  Negative, Inconclusive Final    POC Phencyclidine 12/04/2023 Negative  Negative, Inconclusive Final    POC Methylenedioxymethamphetamine * 12/04/2023 Negative  Negative, Inconclusive Final    POC Tricyclic Antidepressants 12/04/2023 Negative  Negative, Inconclusive Final    POC Buprenorphine 12/04/2023 Negative   Final     Acceptable 12/04/2023 Yes   Final    POC Temperature (Urine) 12/04/2023 90   Final         No orders of the defined types were placed in this encounter.        Requested Prescriptions     Signed Prescriptions Disp Refills    HYDROcodone-acetaminophen (NORCO)  mg per tablet 60 tablet 0     Sig: Take 1 tablet by mouth every 12 (twelve) hours as needed for Pain.    HYDROcodone-acetaminophen (NORCO)  mg per tablet 60 tablet 0     Sig: Take 1 tablet by mouth every 12 (twelve) hours as needed for Pain.       Assessment:     1. Rheumatoid arthritis involving multiple sites with positive rheumatoid factor    2. Lumbosacral spondylosis without myelopathy    3. Chronic left shoulder pain           A's of Opioid Risk  Assessment  Activity:Patient can perform ADL.   Analgesia:Patients pain is partially controlled by current medication. Patient has tried OTC medications such as Tylenol and Ibuprofen with out relief.   Adverse Effects: Patient denies constipation or sedation.  Aberrant Behavior:  reviewed with no aberrant drug seeking/taking behavior.  Overdose reversal drug naloxone discussed    Drug screen reviewed      X-ray lumbar spine [facility] November 11, 2020, multiple level degenerative changes no instability noted with flexion-extension no fracture noted      Plan:     Nellie Morgan February 2023    She picked up tramadol from primary care provider March 2, 2023  This prescription was destroyed    She verbalizes if this happens again she will no longer receive narcotics [facility]    Follows rheumatology rheumatoid arthritis [facility]    Complaining left shoulder pain worse with range of motion    She states she would like to continue conservative management current medication is helping her discomfort     Will consider further evaluation left shoulder pain if continues    Will continue home exercise program as directed    Continue current medication    Follow-up 2 months    Dr. Claire, October 2024    Bring original prescription medication bottles/container/box with labels to each visit

## 2024-04-02 ENCOUNTER — OFFICE VISIT (OUTPATIENT)
Dept: PAIN MEDICINE | Facility: CLINIC | Age: 54
End: 2024-04-02
Payer: MEDICARE

## 2024-04-02 VITALS
RESPIRATION RATE: 18 BRPM | DIASTOLIC BLOOD PRESSURE: 72 MMHG | BODY MASS INDEX: 43.4 KG/M2 | HEART RATE: 64 BPM | SYSTOLIC BLOOD PRESSURE: 145 MMHG | WEIGHT: 293 LBS | HEIGHT: 69 IN

## 2024-04-02 DIAGNOSIS — M25.512 CHRONIC LEFT SHOULDER PAIN: Chronic | ICD-10-CM

## 2024-04-02 DIAGNOSIS — M47.817 LUMBOSACRAL SPONDYLOSIS WITHOUT MYELOPATHY: Chronic | ICD-10-CM

## 2024-04-02 DIAGNOSIS — G89.29 CHRONIC LEFT SHOULDER PAIN: Chronic | ICD-10-CM

## 2024-04-02 DIAGNOSIS — M05.79 RHEUMATOID ARTHRITIS INVOLVING MULTIPLE SITES WITH POSITIVE RHEUMATOID FACTOR: Primary | Chronic | ICD-10-CM

## 2024-04-02 PROCEDURE — 3077F SYST BP >= 140 MM HG: CPT | Mod: CPTII,,, | Performed by: PHYSICIAN ASSISTANT

## 2024-04-02 PROCEDURE — 3008F BODY MASS INDEX DOCD: CPT | Mod: CPTII,,, | Performed by: PHYSICIAN ASSISTANT

## 2024-04-02 PROCEDURE — 99215 OFFICE O/P EST HI 40 MIN: CPT | Mod: PBBFAC | Performed by: PHYSICIAN ASSISTANT

## 2024-04-02 PROCEDURE — 1159F MED LIST DOCD IN RCRD: CPT | Mod: CPTII,,, | Performed by: PHYSICIAN ASSISTANT

## 2024-04-02 PROCEDURE — 3078F DIAST BP <80 MM HG: CPT | Mod: CPTII,,, | Performed by: PHYSICIAN ASSISTANT

## 2024-04-02 PROCEDURE — 99214 OFFICE O/P EST MOD 30 MIN: CPT | Mod: S$PBB,,, | Performed by: PHYSICIAN ASSISTANT

## 2024-04-02 RX ORDER — HYDROCODONE BITARTRATE AND ACETAMINOPHEN 10; 325 MG/1; MG/1
1 TABLET ORAL EVERY 12 HOURS PRN
Qty: 60 TABLET | Refills: 0 | Status: SHIPPED | OUTPATIENT
Start: 2024-05-07 | End: 2024-05-29 | Stop reason: SDUPTHER

## 2024-04-02 RX ORDER — HYDROCODONE BITARTRATE AND ACETAMINOPHEN 10; 325 MG/1; MG/1
1 TABLET ORAL EVERY 12 HOURS PRN
Qty: 60 TABLET | Refills: 0 | Status: SHIPPED | OUTPATIENT
Start: 2024-04-06 | End: 2024-05-06

## 2024-04-03 DIAGNOSIS — I10 PRIMARY HYPERTENSION: ICD-10-CM

## 2024-04-03 NOTE — TELEPHONE ENCOUNTER
----- Message from Radha Apodaca sent at 4/3/2024  1:34 PM CDT -----  R/F on Hydrochlorothiazide- MA thelma

## 2024-04-04 RX ORDER — HYDROCHLOROTHIAZIDE 25 MG/1
25 TABLET ORAL EVERY MORNING
Qty: 90 TABLET | Refills: 3 | Status: SHIPPED | OUTPATIENT
Start: 2024-04-04 | End: 2024-05-06 | Stop reason: SDUPTHER

## 2024-04-30 ENCOUNTER — EXTERNAL CHRONIC CARE MANAGEMENT (OUTPATIENT)
Dept: PRIMARY CARE CLINIC | Facility: CLINIC | Age: 54
End: 2024-04-30
Payer: MEDICARE

## 2024-04-30 PROCEDURE — G0511 CCM/BHI BY RHC/FQHC 20MIN MO: HCPCS | Mod: ,,, | Performed by: FAMILY MEDICINE

## 2024-05-06 ENCOUNTER — OFFICE VISIT (OUTPATIENT)
Dept: PRIMARY CARE CLINIC | Facility: CLINIC | Age: 54
End: 2024-05-06
Payer: MEDICARE

## 2024-05-06 VITALS
BODY MASS INDEX: 43.4 KG/M2 | WEIGHT: 293 LBS | HEIGHT: 69 IN | HEART RATE: 81 BPM | OXYGEN SATURATION: 99 % | DIASTOLIC BLOOD PRESSURE: 70 MMHG | SYSTOLIC BLOOD PRESSURE: 135 MMHG | TEMPERATURE: 98 F | RESPIRATION RATE: 18 BRPM

## 2024-05-06 DIAGNOSIS — E78.5 HYPERLIPIDEMIA, UNSPECIFIED HYPERLIPIDEMIA TYPE: ICD-10-CM

## 2024-05-06 DIAGNOSIS — I10 HYPERTENSION, UNSPECIFIED TYPE: ICD-10-CM

## 2024-05-06 DIAGNOSIS — M05.79 RHEUMATOID ARTHRITIS INVOLVING MULTIPLE SITES WITH POSITIVE RHEUMATOID FACTOR: ICD-10-CM

## 2024-05-06 DIAGNOSIS — I10 PRIMARY HYPERTENSION: ICD-10-CM

## 2024-05-06 DIAGNOSIS — E11.9 TYPE 2 DIABETES MELLITUS WITHOUT COMPLICATION, WITHOUT LONG-TERM CURRENT USE OF INSULIN: Primary | ICD-10-CM

## 2024-05-06 DIAGNOSIS — M62.838 MUSCLE SPASM: ICD-10-CM

## 2024-05-06 LAB
ALBUMIN SERPL BCP-MCNC: 3.4 G/DL (ref 3.5–5)
ALBUMIN/GLOB SERPL: 0.7 {RATIO}
ALP SERPL-CCNC: 116 U/L (ref 41–108)
ALT SERPL W P-5'-P-CCNC: 49 U/L (ref 13–56)
ANION GAP SERPL CALCULATED.3IONS-SCNC: 6 MMOL/L (ref 7–16)
AST SERPL W P-5'-P-CCNC: 39 U/L (ref 15–37)
BASOPHILS # BLD AUTO: 0.07 K/UL (ref 0–0.2)
BASOPHILS NFR BLD AUTO: 1.4 % (ref 0–1)
BILIRUB SERPL-MCNC: 0.6 MG/DL (ref ?–1.2)
BUN SERPL-MCNC: 6 MG/DL (ref 7–18)
BUN/CREAT SERPL: 7 (ref 6–20)
CALCIUM SERPL-MCNC: 8.8 MG/DL (ref 8.5–10.1)
CHLORIDE SERPL-SCNC: 99 MMOL/L (ref 98–107)
CHOLEST SERPL-MCNC: 168 MG/DL (ref 0–200)
CHOLEST/HDLC SERPL: 5.1 {RATIO}
CO2 SERPL-SCNC: 35 MMOL/L (ref 21–32)
CREAT SERPL-MCNC: 0.9 MG/DL (ref 0.55–1.02)
DIFFERENTIAL METHOD BLD: ABNORMAL
EGFR (NO RACE VARIABLE) (RUSH/TITUS): 76 ML/MIN/1.73M2
EOSINOPHIL # BLD AUTO: 0.19 K/UL (ref 0–0.5)
EOSINOPHIL NFR BLD AUTO: 3.9 % (ref 1–4)
ERYTHROCYTE [DISTWIDTH] IN BLOOD BY AUTOMATED COUNT: 15 % (ref 11.5–14.5)
EST. AVERAGE GLUCOSE BLD GHB EST-MCNC: 148 MG/DL
GLOBULIN SER-MCNC: 4.6 G/DL (ref 2–4)
GLUCOSE SERPL-MCNC: 62 MG/DL (ref 74–106)
GLUCOSE SERPL-MCNC: 73 MG/DL (ref 70–110)
HBA1C MFR BLD HPLC: 6.8 % (ref 4.5–6.6)
HCT VFR BLD AUTO: 37.5 % (ref 38–47)
HDLC SERPL-MCNC: 33 MG/DL (ref 40–60)
HGB BLD-MCNC: 12.4 G/DL (ref 12–16)
IMM GRANULOCYTES # BLD AUTO: 0.01 K/UL (ref 0–0.04)
IMM GRANULOCYTES NFR BLD: 0.2 % (ref 0–0.4)
LDLC SERPL CALC-MCNC: 116 MG/DL
LDLC/HDLC SERPL: 3.5 {RATIO}
LYMPHOCYTES # BLD AUTO: 2.33 K/UL (ref 1–4.8)
LYMPHOCYTES NFR BLD AUTO: 48.2 % (ref 27–41)
MCH RBC QN AUTO: 29 PG (ref 27–31)
MCHC RBC AUTO-ENTMCNC: 33.1 G/DL (ref 32–36)
MCV RBC AUTO: 87.8 FL (ref 80–96)
MONOCYTES # BLD AUTO: 0.34 K/UL (ref 0–0.8)
MONOCYTES NFR BLD AUTO: 7 % (ref 2–6)
MPC BLD CALC-MCNC: 10.4 FL (ref 9.4–12.4)
NEUTROPHILS # BLD AUTO: 1.89 K/UL (ref 1.8–7.7)
NEUTROPHILS NFR BLD AUTO: 39.3 % (ref 53–65)
NONHDLC SERPL-MCNC: 135 MG/DL
NRBC # BLD AUTO: 0 X10E3/UL
NRBC, AUTO (.00): 0 %
PLATELET # BLD AUTO: 287 K/UL (ref 150–400)
POTASSIUM SERPL-SCNC: 2.8 MMOL/L (ref 3.5–5.1)
PROT SERPL-MCNC: 8 G/DL (ref 6.4–8.2)
RBC # BLD AUTO: 4.27 M/UL (ref 4.2–5.4)
SODIUM SERPL-SCNC: 137 MMOL/L (ref 136–145)
TRIGL SERPL-MCNC: 93 MG/DL (ref 35–150)
VLDLC SERPL-MCNC: 19 MG/DL
WBC # BLD AUTO: 4.83 K/UL (ref 4.5–11)

## 2024-05-06 PROCEDURE — 80053 COMPREHEN METABOLIC PANEL: CPT | Mod: ,,, | Performed by: CLINICAL MEDICAL LABORATORY

## 2024-05-06 PROCEDURE — 83036 HEMOGLOBIN GLYCOSYLATED A1C: CPT | Mod: ,,, | Performed by: CLINICAL MEDICAL LABORATORY

## 2024-05-06 PROCEDURE — 99214 OFFICE O/P EST MOD 30 MIN: CPT | Mod: ,,, | Performed by: FAMILY MEDICINE

## 2024-05-06 PROCEDURE — 3075F SYST BP GE 130 - 139MM HG: CPT | Mod: ,,, | Performed by: FAMILY MEDICINE

## 2024-05-06 PROCEDURE — 3078F DIAST BP <80 MM HG: CPT | Mod: ,,, | Performed by: FAMILY MEDICINE

## 2024-05-06 PROCEDURE — 1160F RVW MEDS BY RX/DR IN RCRD: CPT | Mod: ,,, | Performed by: FAMILY MEDICINE

## 2024-05-06 PROCEDURE — 80061 LIPID PANEL: CPT | Mod: ,,, | Performed by: CLINICAL MEDICAL LABORATORY

## 2024-05-06 PROCEDURE — 82962 GLUCOSE BLOOD TEST: CPT | Mod: ,,, | Performed by: FAMILY MEDICINE

## 2024-05-06 PROCEDURE — 85025 COMPLETE CBC W/AUTO DIFF WBC: CPT | Mod: ,,, | Performed by: CLINICAL MEDICAL LABORATORY

## 2024-05-06 PROCEDURE — 1159F MED LIST DOCD IN RCRD: CPT | Mod: ,,, | Performed by: FAMILY MEDICINE

## 2024-05-06 PROCEDURE — 3008F BODY MASS INDEX DOCD: CPT | Mod: ,,, | Performed by: FAMILY MEDICINE

## 2024-05-06 RX ORDER — METFORMIN HYDROCHLORIDE 500 MG/1
500 TABLET ORAL 2 TIMES DAILY WITH MEALS
Qty: 180 TABLET | Refills: 3 | Status: SHIPPED | OUTPATIENT
Start: 2024-05-06

## 2024-05-06 RX ORDER — TIRZEPATIDE 2.5 MG/.5ML
2.5 INJECTION, SOLUTION SUBCUTANEOUS
Qty: 4 PEN | Refills: 2 | Status: SHIPPED | OUTPATIENT
Start: 2024-05-06

## 2024-05-06 RX ORDER — NABUMETONE 750 MG/1
TABLET, FILM COATED ORAL
Qty: 180 TABLET | Refills: 3 | Status: SHIPPED | OUTPATIENT
Start: 2024-05-06

## 2024-05-06 RX ORDER — METOPROLOL TARTRATE 50 MG/1
50 TABLET ORAL 2 TIMES DAILY
Qty: 180 TABLET | Refills: 3 | Status: SHIPPED | OUTPATIENT
Start: 2024-05-06

## 2024-05-06 RX ORDER — HYDROCHLOROTHIAZIDE 25 MG/1
25 TABLET ORAL EVERY MORNING
Qty: 90 TABLET | Refills: 3 | Status: SHIPPED | OUTPATIENT
Start: 2024-05-06

## 2024-05-06 RX ORDER — ATORVASTATIN CALCIUM 10 MG/1
10 TABLET, FILM COATED ORAL DAILY
Qty: 90 TABLET | Refills: 3 | Status: SHIPPED | OUTPATIENT
Start: 2024-05-06

## 2024-05-06 RX ORDER — LORATADINE 10 MG/1
10 TABLET ORAL DAILY
Qty: 90 TABLET | Refills: 3 | Status: SHIPPED | OUTPATIENT
Start: 2024-05-06

## 2024-05-06 RX ORDER — CYCLOBENZAPRINE HCL 10 MG
10 TABLET ORAL 3 TIMES DAILY PRN
Qty: 90 TABLET | Refills: 2 | Status: SHIPPED | OUTPATIENT
Start: 2024-05-06

## 2024-05-06 RX ORDER — CETIRIZINE HYDROCHLORIDE 10 MG/1
TABLET ORAL
COMMUNITY

## 2024-05-06 RX ORDER — MONTELUKAST SODIUM 10 MG/1
10 TABLET ORAL NIGHTLY
Qty: 90 TABLET | Refills: 3 | Status: SHIPPED | OUTPATIENT
Start: 2024-05-06

## 2024-05-06 RX ORDER — POTASSIUM CHLORIDE 20 MEQ/1
20 TABLET, EXTENDED RELEASE ORAL 2 TIMES DAILY
Qty: 180 TABLET | Refills: 3 | Status: SHIPPED | OUTPATIENT
Start: 2024-05-06

## 2024-05-06 RX ORDER — ERGOCALCIFEROL 1.25 MG/1
1 CAPSULE ORAL
COMMUNITY
Start: 2023-11-15

## 2024-05-06 RX ORDER — GLIMEPIRIDE 4 MG/1
4 TABLET ORAL
Qty: 90 TABLET | Refills: 3 | Status: SHIPPED | OUTPATIENT
Start: 2024-05-06

## 2024-05-06 NOTE — PROGRESS NOTES
Subjective     Patient ID: Natali Claire is a 53 y.o. female.    Chief Complaint: Diabetes and Hypertension    Pt. Is requesting a trial of mounjaro to help with diabetes and weight loss. She has RA and would greatly benefit with weight loss.    Diabetes  Pertinent negatives for hypoglycemia include no confusion or headaches. Pertinent negatives for diabetes include no chest pain and no fatigue.   Hypertension  Pertinent negatives include no chest pain, headaches or shortness of breath.     Review of Systems   Constitutional:  Negative for fatigue and fever.   HENT:  Negative for nasal congestion and dental problem.    Eyes:  Negative for discharge.   Respiratory:  Negative for cough and shortness of breath.    Cardiovascular:  Negative for chest pain.   Gastrointestinal:  Negative for constipation, diarrhea, nausea and vomiting.   Genitourinary:  Negative for bladder incontinence, difficulty urinating and hot flashes.   Musculoskeletal:  Positive for arthralgias.   Allergic/Immunologic: Negative for environmental allergies.   Neurological:  Negative for headaches.   Psychiatric/Behavioral:  Negative for behavioral problems and confusion.           Objective     Physical Exam  Vitals and nursing note reviewed.   Constitutional:       Appearance: Normal appearance. She is obese.   HENT:      Head: Normocephalic and atraumatic.      Right Ear: Tympanic membrane normal.      Left Ear: Tympanic membrane normal.      Nose: Nose normal.      Mouth/Throat:      Mouth: Mucous membranes are moist.   Eyes:      Extraocular Movements: Extraocular movements intact.      Conjunctiva/sclera: Conjunctivae normal.      Pupils: Pupils are equal, round, and reactive to light.   Cardiovascular:      Rate and Rhythm: Normal rate and regular rhythm.      Pulses: Normal pulses.   Pulmonary:      Effort: Pulmonary effort is normal.      Breath sounds: Normal breath sounds.   Abdominal:      General: Abdomen is flat. Bowel sounds are normal.       Palpations: Abdomen is soft.   Musculoskeletal:         General: Normal range of motion.      Cervical back: Normal range of motion and neck supple.      Comments: Multiple site arthritis   Skin:     General: Skin is warm and dry.   Neurological:      General: No focal deficit present.      Mental Status: She is alert and oriented to person, place, and time.   Psychiatric:         Mood and Affect: Mood normal.            Assessment and Plan     1. Type 2 diabetes mellitus without complication, without long-term current use of insulin  -     Hemoglobin A1C; Future; Expected date: 05/06/2024  -     POCT Glucose, Hand-Held Device  -     loratadine (ALLERGY RELIEF, LORATADINE,) 10 mg tablet; Take 1 tablet (10 mg total) by mouth once daily.  Dispense: 90 tablet; Refill: 3  -     glimepiride (AMARYL) 4 MG tablet; Take 1 tablet (4 mg total) by mouth before breakfast.  Dispense: 90 tablet; Refill: 3  -     metFORMIN (GLUCOPHAGE) 500 MG tablet; Take 1 tablet (500 mg total) by mouth 2 (two) times daily with meals.  Dispense: 180 tablet; Refill: 3  -     montelukast (SINGULAIR) 10 mg tablet; Take 1 tablet (10 mg total) by mouth every evening.  Dispense: 90 tablet; Refill: 3    2. Primary hypertension  -     CBC Auto Differential; Future; Expected date: 05/06/2024  -     Comprehensive Metabolic Panel; Future; Expected date: 05/06/2024  -     Lipid Panel; Future; Expected date: 05/06/2024  -     hydroCHLOROthiazide (HYDRODIURIL) 25 MG tablet; Take 1 tablet (25 mg total) by mouth every morning.  Dispense: 90 tablet; Refill: 3    3. Hyperlipidemia, unspecified hyperlipidemia type  -     atorvastatin (LIPITOR) 10 MG tablet; Take 1 tablet (10 mg total) by mouth once daily.  Dispense: 90 tablet; Refill: 3    4. Muscle spasm  -     cyclobenzaprine (FLEXERIL) 10 MG tablet; Take 1 tablet (10 mg total) by mouth 3 (three) times daily as needed for Muscle spasms.  Dispense: 90 tablet; Refill: 2    5. Hypertension, unspecified type  -      metoprolol tartrate (LOPRESSOR) 50 MG tablet; Take 1 tablet (50 mg total) by mouth 2 (two) times daily.  Dispense: 180 tablet; Refill: 3  -     potassium chloride SA (K-DUR,KLOR-CON) 20 MEQ tablet; Take 1 tablet (20 mEq total) by mouth 2 (two) times daily.  Dispense: 180 tablet; Refill: 3    6. Rheumatoid arthritis involving multiple sites with positive rheumatoid factor  -     nabumetone (RELAFEN) 750 MG tablet; TAKE 1 TABLET (750 MG TOTAL) BY MOUTH 2 (TWO) TIMES DAILY. WITH FOOD FOR PAIN.  Dispense: 180 tablet; Refill: 3        RTC 1 month         No follow-ups on file.

## 2024-05-08 ENCOUNTER — TELEPHONE (OUTPATIENT)
Dept: PRIMARY CARE CLINIC | Facility: CLINIC | Age: 54
End: 2024-05-08
Payer: MEDICARE

## 2024-05-29 NOTE — PROGRESS NOTES
Subjective:         Patient ID: Natali Claire is a 53 y.o. female.    Chief Complaint: Low-back Pain, Hip Pain, and Shoulder Pain        Pain  This is a chronic problem. The current episode started more than 1 year ago. The problem occurs daily. The problem has been unchanged. Associated symptoms include arthralgias and neck pain. Pertinent negatives include no anorexia, chest pain, chills, coughing, diaphoresis, fever, sore throat, vertigo or vomiting.     Review of Systems   Constitutional:  Negative for activity change, appetite change, chills, diaphoresis, fever and unexpected weight change.   HENT:  Negative for drooling, ear discharge, ear pain, facial swelling, nosebleeds, sore throat, trouble swallowing, voice change and goiter.    Eyes:  Negative for photophobia, pain, discharge, redness and visual disturbance.   Respiratory:  Negative for apnea, cough, choking, chest tightness, shortness of breath, wheezing and stridor.    Cardiovascular:  Negative for chest pain, palpitations and leg swelling.   Gastrointestinal:  Negative for abdominal distention, anorexia, diarrhea, rectal pain, vomiting and fecal incontinence.   Endocrine: Negative for cold intolerance, heat intolerance, polydipsia, polyphagia and polyuria.   Genitourinary:  Negative for bladder incontinence, dysuria, flank pain, frequency and hot flashes.   Musculoskeletal:  Positive for arthralgias, back pain, leg pain and neck pain.   Integumentary:  Negative for color change and pallor.   Allergic/Immunologic: Negative for immunocompromised state.   Neurological:  Negative for dizziness, vertigo, seizures, syncope, facial asymmetry, speech difficulty, light-headedness, memory loss and coordination difficulties.   Hematological:  Negative for adenopathy. Does not bruise/bleed easily.   Psychiatric/Behavioral:  Negative for agitation, behavioral problems, confusion, decreased concentration, dysphoric mood, hallucinations, self-injury and suicidal  "ideas. The patient is not nervous/anxious and is not hyperactive.            Past Medical History:   Diagnosis Date    Anxiety     Arthritis     Chronic low back pain     Chronic pain syndrome     Degenerative joint disease involving multiple joints     Diabetes mellitus, type 2     GERD (gastroesophageal reflux disease)     Hyperlipidemia     Hypertension     Lumbar radiculopathy     Lumbosacral spondylosis     Multiple joint pain     Onychomycosis     Osteoarthritis     Other long term (current) drug therapy     Rheumatoid arthritis      Past Surgical History:   Procedure Laterality Date    BREAST SURGERY      CHOLECYSTECTOMY      TUBAL LIGATION       Social History     Socioeconomic History    Marital status: Single    Number of children: 2   Occupational History    Occupation: disable   Tobacco Use    Smoking status: Never     Passive exposure: Never    Smokeless tobacco: Never   Substance and Sexual Activity    Alcohol use: Not Currently    Drug use: Never    Sexual activity: Yes     Partners: Male     Family History   Problem Relation Name Age of Onset    Diabetes Mother      Heart disease Father      Diabetes Sister      Breast cancer Paternal Cousin       Review of patient's allergies indicates:  No Known Allergies     Objective:  Vitals:    06/05/24 1331   BP: 136/69   Pulse: 68   Resp: 18   Weight: 129.7 kg (286 lb)   Height: 5' 9" (1.753 m)   PainSc:   4             Physical Exam  Vitals and nursing note reviewed. Exam conducted with a chaperone present.   Constitutional:       General: She is awake. She is not in acute distress.     Appearance: Normal appearance. She is not ill-appearing, toxic-appearing or diaphoretic.   HENT:      Head: Normocephalic and atraumatic.      Nose: Nose normal.      Mouth/Throat:      Mouth: Mucous membranes are moist.      Pharynx: Oropharynx is clear.   Eyes:      Conjunctiva/sclera: Conjunctivae normal.      Pupils: Pupils are equal, round, " and reactive to light.   Cardiovascular:      Rate and Rhythm: Normal rate.   Pulmonary:      Effort: Pulmonary effort is normal. No respiratory distress.   Abdominal:      Palpations: Abdomen is soft.   Musculoskeletal:         General: Normal range of motion.      Cervical back: Normal range of motion and neck supple. Tenderness present.      Thoracic back: Tenderness present.      Lumbar back: Tenderness present.   Skin:     General: Skin is warm and dry.   Neurological:      General: No focal deficit present.      Mental Status: She is alert and oriented to person, place, and time. Mental status is at baseline.      Cranial Nerves: No cranial nerve deficit (II-XII).   Psychiatric:         Mood and Affect: Mood normal.         Behavior: Behavior normal. Behavior is cooperative.         Thought Content: Thought content normal.         Mammo Digital Screening Bilat w/ Kael  Narrative: Result:   Mammo Digital Screening Bilat w/ Kael     History:  Patient is 53 y.o. and is seen for a screening mammogram.    Films Compared:  Compared to: 10/18/2022 Mammo Digital Screening Bilat, 10/26/2021 Mammo   Digital Diagnostic Left, and 10/05/2021 Mammo Digital Screening Bilat     Findings:  This procedure was performed using tomosynthesis. Computer-aided detection   was utilized in the interpretation of this examination.  The breasts have scattered areas of fibroglandular density. There is no   evidence of suspicious masses, calcifications, or other abnormal findings.  Impression: Bilateral  There is no mammographic evidence of malignancy.    BI-RADS Category:   Overall: 2 - Benign       Recommendation:  Routine screening mammogram in 1 year is recommended.    Your estimated lifetime risk of breast cancer (to age 85) based on   Tyrer-Cuzick risk assessment model is Tyrer-Cuzick: 7.15 %. According to   the American Cancer Society, patients with a lifetime breast cancer risk   of 20% or higher might benefit from supplemental  screening tests.         Office Visit on 05/06/2024   Component Date Value Ref Range Status    POC Glucose 05/06/2024 73  70 - 110 MG/DL Final    Sodium 05/06/2024 137  136 - 145 mmol/L Final    Potassium 05/06/2024 2.8 (L)  3.5 - 5.1 mmol/L Final    Chloride 05/06/2024 99  98 - 107 mmol/L Final    CO2 05/06/2024 35 (H)  21 - 32 mmol/L Final    Anion Gap 05/06/2024 6 (L)  7 - 16 mmol/L Final    Glucose 05/06/2024 62 (L)  74 - 106 mg/dL Final    BUN 05/06/2024 6 (L)  7 - 18 mg/dL Final    Creatinine 05/06/2024 0.90  0.55 - 1.02 mg/dL Final    BUN/Creatinine Ratio 05/06/2024 7  6 - 20 Final    Calcium 05/06/2024 8.8  8.5 - 10.1 mg/dL Final    Total Protein 05/06/2024 8.0  6.4 - 8.2 g/dL Final    Albumin 05/06/2024 3.4 (L)  3.5 - 5.0 g/dL Final    Globulin 05/06/2024 4.6 (H)  2.0 - 4.0 g/dL Final    A/G Ratio 05/06/2024 0.7   Final    Bilirubin, Total 05/06/2024 0.6  >0.0 - 1.2 mg/dL Final    Alk Phos 05/06/2024 116 (H)  41 - 108 U/L Final    ALT 05/06/2024 49  13 - 56 U/L Final    AST 05/06/2024 39 (H)  15 - 37 U/L Final    eGFR 05/06/2024 76  >=60 mL/min/1.73m2 Final    Hemoglobin A1C 05/06/2024 6.8 (H)  4.5 - 6.6 % Final    Estimated Average Glucose 05/06/2024 148  mg/dL Final    Triglycerides 05/06/2024 93  35 - 150 mg/dL Final    Cholesterol 05/06/2024 168  0 - 200 mg/dL Final    HDL Cholesterol 05/06/2024 33 (L)  40 - 60 mg/dL Final    Cholesterol/HDL Ratio (Risk Factor) 05/06/2024 5.1   Final    Non-HDL 05/06/2024 135  mg/dL Final    LDL Calculated 05/06/2024 116  mg/dL Final    LDL/HDL 05/06/2024 3.5   Final    VLDL 05/06/2024 19  mg/dL Final    WBC 05/06/2024 4.83  4.50 - 11.00 K/uL Final    RBC 05/06/2024 4.27  4.20 - 5.40 M/uL Final    Hemoglobin 05/06/2024 12.4  12.0 - 16.0 g/dL Final    Hematocrit 05/06/2024 37.5 (L)  38.0 - 47.0 % Final    MCV 05/06/2024 87.8  80.0 - 96.0 fL Final    MCH 05/06/2024 29.0  27.0 - 31.0 pg Final    MCHC 05/06/2024 33.1  32.0 - 36.0 g/dL  Final    RDW 05/06/2024 15.0 (H)  11.5 - 14.5 % Final    Platelet Count 05/06/2024 287  150 - 400 K/uL Final    MPV 05/06/2024 10.4  9.4 - 12.4 fL Final    Neutrophils % 05/06/2024 39.3 (L)  53.0 - 65.0 % Final    Lymphocytes % 05/06/2024 48.2 (H)  27.0 - 41.0 % Final    Monocytes % 05/06/2024 7.0 (H)  2.0 - 6.0 % Final    Eosinophils % 05/06/2024 3.9  1.0 - 4.0 % Final    Basophils % 05/06/2024 1.4 (H)  0.0 - 1.0 % Final    Immature Granulocytes % 05/06/2024 0.2  0.0 - 0.4 % Final    nRBC, Auto 05/06/2024 0.0  <=0.0 % Final    Neutrophils, Abs 05/06/2024 1.89  1.80 - 7.70 K/uL Final    Lymphocytes, Absolute 05/06/2024 2.33  1.00 - 4.80 K/uL Final    Monocytes, Absolute 05/06/2024 0.34  0.00 - 0.80 K/uL Final    Eosinophils, Absolute 05/06/2024 0.19  0.00 - 0.50 K/uL Final    Basophils, Absolute 05/06/2024 0.07  0.00 - 0.20 K/uL Final    Immature Granulocytes, Absolute 05/06/2024 0.01  0.00 - 0.04 K/uL Final    nRBC, Absolute 05/06/2024 0.00  <=0.00 x10e3/uL Final    Diff Type 05/06/2024 Auto   Final   Office Visit on 12/28/2023   Component Date Value Ref Range Status    Rapid Influenza A Ag 12/28/2023 Negative  Negative Final    Rapid Influenza B Ag 12/28/2023 Negative  Negative Final     Acceptable 12/28/2023 Yes   Final         Orders Placed This Encounter   Procedures    POCT Urine Drug Screen Presump     Interpretive Information:     Negative:  No drug detected at the cut off level.   Positive:  This result represents presumptive positive for the   tested drug, other substances may yield a positive response other   than the analyte of interest. This result should be utilized for   diagnostic purpose only. Confirmation testing will be performed upon physician request only.            Requested Prescriptions     Signed Prescriptions Disp Refills    HYDROcodone-acetaminophen (NORCO)  mg per tablet 60 tablet 0     Sig: Take 1 tablet by mouth every 12 (twelve) hours as  needed for Pain.    HYDROcodone-acetaminophen (NORCO)  mg per tablet 60 tablet 0     Sig: Take 1 tablet by mouth every 12 (twelve) hours as needed for Pain.       Assessment:     1. Rheumatoid arthritis involving multiple sites with positive rheumatoid factor    2. Lumbosacral spondylosis without myelopathy    3. Encounter for long-term (current) use of other medications             A's of Opioid Risk Assessment  Activity:Patient can perform ADL.   Analgesia:Patients pain is partially controlled by current medication. Patient has tried OTC medications such as Tylenol and Ibuprofen with out relief.   Adverse Effects: Patient denies constipation or sedation.  Aberrant Behavior:  reviewed with no aberrant drug seeking/taking behavior.  Overdose reversal drug naloxone discussed    Drug screen reviewed      X-ray lumbar spine [facility] November 11, 2020, multiple level degenerative changes no instability noted with flexion-extension no fracture noted      Plan:    July prescription July 6     Use July 7 as refill date next visit           Nellie Morgan February 2023    Follows rheumatology rheumatoid arthritis [facility]      She states she is doing well current medication is helping control her discomfort     She would like to continue with current treatment plan    Will continue home exercise program as directed    Continue current medication    Follow-up 2 months    Dr. Claire, October 2024    Bring original prescription medication bottles/container/box with labels to each visit

## 2024-05-31 ENCOUNTER — EXTERNAL CHRONIC CARE MANAGEMENT (OUTPATIENT)
Dept: PRIMARY CARE CLINIC | Facility: CLINIC | Age: 54
End: 2024-05-31
Payer: MEDICARE

## 2024-05-31 PROCEDURE — G0511 CCM/BHI BY RHC/FQHC 20MIN MO: HCPCS | Mod: ,,, | Performed by: FAMILY MEDICINE

## 2024-06-05 ENCOUNTER — OFFICE VISIT (OUTPATIENT)
Dept: PAIN MEDICINE | Facility: CLINIC | Age: 54
End: 2024-06-05
Payer: MEDICARE

## 2024-06-05 VITALS
HEART RATE: 68 BPM | WEIGHT: 286 LBS | HEIGHT: 69 IN | DIASTOLIC BLOOD PRESSURE: 69 MMHG | BODY MASS INDEX: 42.36 KG/M2 | RESPIRATION RATE: 18 BRPM | SYSTOLIC BLOOD PRESSURE: 136 MMHG

## 2024-06-05 DIAGNOSIS — M05.79 RHEUMATOID ARTHRITIS INVOLVING MULTIPLE SITES WITH POSITIVE RHEUMATOID FACTOR: Primary | Chronic | ICD-10-CM

## 2024-06-05 DIAGNOSIS — M47.817 LUMBOSACRAL SPONDYLOSIS WITHOUT MYELOPATHY: Chronic | ICD-10-CM

## 2024-06-05 DIAGNOSIS — Z79.899 ENCOUNTER FOR LONG-TERM (CURRENT) USE OF OTHER MEDICATIONS: ICD-10-CM

## 2024-06-05 PROCEDURE — 80305 DRUG TEST PRSMV DIR OPT OBS: CPT | Mod: PBBFAC | Performed by: PHYSICIAN ASSISTANT

## 2024-06-05 PROCEDURE — 3008F BODY MASS INDEX DOCD: CPT | Mod: CPTII,,, | Performed by: PHYSICIAN ASSISTANT

## 2024-06-05 PROCEDURE — 99214 OFFICE O/P EST MOD 30 MIN: CPT | Mod: S$PBB,,, | Performed by: PHYSICIAN ASSISTANT

## 2024-06-05 PROCEDURE — 3078F DIAST BP <80 MM HG: CPT | Mod: CPTII,,, | Performed by: PHYSICIAN ASSISTANT

## 2024-06-05 PROCEDURE — 99215 OFFICE O/P EST HI 40 MIN: CPT | Mod: PBBFAC | Performed by: PHYSICIAN ASSISTANT

## 2024-06-05 PROCEDURE — 1159F MED LIST DOCD IN RCRD: CPT | Mod: CPTII,,, | Performed by: PHYSICIAN ASSISTANT

## 2024-06-05 PROCEDURE — 3075F SYST BP GE 130 - 139MM HG: CPT | Mod: CPTII,,, | Performed by: PHYSICIAN ASSISTANT

## 2024-06-05 PROCEDURE — 99999PBSHW POCT URINE DRUG SCREEN PRESUMP: Mod: PBBFAC,,,

## 2024-06-05 PROCEDURE — 3044F HG A1C LEVEL LT 7.0%: CPT | Mod: CPTII,,, | Performed by: PHYSICIAN ASSISTANT

## 2024-06-05 RX ORDER — HYDROCODONE BITARTRATE AND ACETAMINOPHEN 10; 325 MG/1; MG/1
1 TABLET ORAL EVERY 12 HOURS PRN
Qty: 60 TABLET | Refills: 0 | Status: SHIPPED | OUTPATIENT
Start: 2024-06-07 | End: 2024-07-07

## 2024-06-05 RX ORDER — HYDROCODONE BITARTRATE AND ACETAMINOPHEN 10; 325 MG/1; MG/1
1 TABLET ORAL EVERY 12 HOURS PRN
Qty: 60 TABLET | Refills: 0 | Status: SHIPPED | OUTPATIENT
Start: 2024-07-06 | End: 2024-08-05

## 2024-06-17 DIAGNOSIS — E11.9 TYPE 2 DIABETES MELLITUS WITHOUT COMPLICATION, WITHOUT LONG-TERM CURRENT USE OF INSULIN: Primary | ICD-10-CM

## 2024-06-17 RX ORDER — TIRZEPATIDE 5 MG/.5ML
5 INJECTION, SOLUTION SUBCUTANEOUS
Qty: 4 ML | Refills: 2 | Status: SHIPPED | OUTPATIENT
Start: 2024-06-17 | End: 2025-06-17

## 2024-06-17 RX ORDER — TIRZEPATIDE 5 MG/.5ML
5 INJECTION, SOLUTION SUBCUTANEOUS
COMMUNITY
End: 2024-06-17 | Stop reason: SDUPTHER

## 2024-06-26 DIAGNOSIS — K21.9 GASTROESOPHAGEAL REFLUX DISEASE WITHOUT ESOPHAGITIS: ICD-10-CM

## 2024-06-26 RX ORDER — ESOMEPRAZOLE MAGNESIUM 40 MG/1
40 CAPSULE, DELAYED RELEASE ORAL
Qty: 90 CAPSULE | Refills: 1 | Status: SHIPPED | OUTPATIENT
Start: 2024-06-26 | End: 2025-06-26

## 2024-06-30 ENCOUNTER — EXTERNAL CHRONIC CARE MANAGEMENT (OUTPATIENT)
Dept: PRIMARY CARE CLINIC | Facility: CLINIC | Age: 54
End: 2024-06-30
Payer: MEDICARE

## 2024-07-17 DIAGNOSIS — D64.9 ANEMIA, UNSPECIFIED TYPE: ICD-10-CM

## 2024-07-17 RX ORDER — FERROUS SULFATE 325(65) MG
325 TABLET, DELAYED RELEASE (ENTERIC COATED) ORAL DAILY
Qty: 90 TABLET | Refills: 3 | Status: SHIPPED | OUTPATIENT
Start: 2024-07-17

## 2024-07-18 ENCOUNTER — PATIENT OUTREACH (OUTPATIENT)
Dept: PRIMARY CARE CLINIC | Facility: CLINIC | Age: 54
End: 2024-07-18
Payer: MEDICARE

## 2024-07-18 LAB
LEFT EYE DM RETINOPATHY: NEGATIVE
RIGHT EYE DM RETINOPATHY: NEGATIVE

## 2024-07-25 NOTE — PROGRESS NOTES
Subjective:         Patient ID: Natali Claire is a 53 y.o. female.    Chief Complaint: Shoulder Pain (left)        Pain  This is a chronic problem. The current episode started more than 1 year ago. The problem occurs daily. The problem has been waxing and waning. Associated symptoms include arthralgias and neck pain. Pertinent negatives include no anorexia, chest pain, chills, coughing, diaphoresis, fever, sore throat, vertigo or vomiting.     Review of Systems   Constitutional:  Negative for activity change, appetite change, chills, diaphoresis, fever and unexpected weight change.   HENT:  Negative for drooling, ear discharge, ear pain, facial swelling, nosebleeds, sore throat, trouble swallowing, voice change and goiter.    Eyes:  Negative for photophobia, pain, discharge, redness and visual disturbance.   Respiratory:  Negative for apnea, cough, choking, chest tightness, shortness of breath, wheezing and stridor.    Cardiovascular:  Negative for chest pain, palpitations and leg swelling.   Gastrointestinal:  Negative for abdominal distention, anorexia, diarrhea, rectal pain, vomiting and fecal incontinence.   Endocrine: Negative for cold intolerance, heat intolerance, polydipsia, polyphagia and polyuria.   Genitourinary:  Negative for bladder incontinence, dysuria, flank pain, frequency and hot flashes.   Musculoskeletal:  Positive for arthralgias, back pain, leg pain and neck pain.   Integumentary:  Negative for color change and pallor.   Allergic/Immunologic: Negative for immunocompromised state.   Neurological:  Negative for dizziness, vertigo, seizures, syncope, facial asymmetry, speech difficulty, light-headedness, memory loss and coordination difficulties.   Hematological:  Negative for adenopathy. Does not bruise/bleed easily.   Psychiatric/Behavioral:  Negative for agitation, behavioral problems, confusion, decreased concentration, dysphoric mood, hallucinations, self-injury and suicidal ideas. The  "patient is not nervous/anxious and is not hyperactive.            Past Medical History:   Diagnosis Date    Anxiety     Arthritis     Chronic low back pain     Chronic pain syndrome     Degenerative joint disease involving multiple joints     Diabetes mellitus, type 2     GERD (gastroesophageal reflux disease)     Hyperlipidemia     Hypertension     Lumbar radiculopathy     Lumbosacral spondylosis     Multiple joint pain     Onychomycosis     Osteoarthritis     Other long term (current) drug therapy     Rheumatoid arthritis      Past Surgical History:   Procedure Laterality Date    BREAST SURGERY      CHOLECYSTECTOMY      TUBAL LIGATION       Social History     Socioeconomic History    Marital status: Single    Number of children: 2   Occupational History    Occupation: disable   Tobacco Use    Smoking status: Never     Passive exposure: Never    Smokeless tobacco: Never   Substance and Sexual Activity    Alcohol use: Not Currently    Drug use: Never    Sexual activity: Yes     Partners: Male     Family History   Problem Relation Name Age of Onset    Diabetes Mother      Heart disease Father      Diabetes Sister      Breast cancer Paternal Cousin       Review of patient's allergies indicates:  No Known Allergies     Objective:  Vitals:    07/31/24 1341 07/31/24 1343   BP: (!) 152/86    Pulse: 78    Resp: 20    Weight: 127.9 kg (282 lb)    Height: 5' 9" (1.753 m)    PainSc:   4   4               Physical Exam  Vitals and nursing note reviewed. Exam conducted with a chaperone present.   Constitutional:       General: She is awake. She is not in acute distress.     Appearance: Normal appearance. She is not ill-appearing, toxic-appearing or diaphoretic.   HENT:      Head: Normocephalic and atraumatic.      Nose: Nose normal.      Mouth/Throat:      Mouth: Mucous membranes are moist.      Pharynx: Oropharynx is clear.   Eyes:      Conjunctiva/sclera: Conjunctivae normal.      Pupils: Pupils are equal, round, and reactive " to light.   Cardiovascular:      Rate and Rhythm: Normal rate.   Pulmonary:      Effort: Pulmonary effort is normal. No respiratory distress.   Abdominal:      Palpations: Abdomen is soft.   Musculoskeletal:         General: Normal range of motion.      Cervical back: Normal range of motion and neck supple. Tenderness present.      Thoracic back: Tenderness present.      Lumbar back: Tenderness present.   Skin:     General: Skin is warm and dry.   Neurological:      General: No focal deficit present.      Mental Status: She is alert and oriented to person, place, and time. Mental status is at baseline.      Cranial Nerves: No cranial nerve deficit (II-XII).   Psychiatric:         Mood and Affect: Mood normal.         Behavior: Behavior normal. Behavior is cooperative.         Thought Content: Thought content normal.           Mammo Digital Screening Bilat w/ Kael  Narrative: Result:   Mammo Digital Screening Bilat w/ Kael     History:  Patient is 53 y.o. and is seen for a screening mammogram.    Films Compared:  Compared to: 10/18/2022 Mammo Digital Screening Bilat, 10/26/2021 Mammo   Digital Diagnostic Left, and 10/05/2021 Mammo Digital Screening Bilat     Findings:  This procedure was performed using tomosynthesis. Computer-aided detection   was utilized in the interpretation of this examination.  The breasts have scattered areas of fibroglandular density. There is no   evidence of suspicious masses, calcifications, or other abnormal findings.  Impression: Bilateral  There is no mammographic evidence of malignancy.    BI-RADS Category:   Overall: 2 - Benign       Recommendation:  Routine screening mammogram in 1 year is recommended.    Your estimated lifetime risk of breast cancer (to age 85) based on   Tyrer-Cuzick risk assessment model is Tyrer-Cuzick: 7.15 %. According to   the American Cancer Society, patients with a lifetime breast cancer risk   of 20% or higher might benefit from supplemental screening  tests.         Patient Outreach on 07/18/2024   Component Date Value Ref Range Status    Left Eye DM Retinopathy 07/01/2024 Negative   Final    Right Eye DM Retinopathy 07/01/2024 Negative   Final   Office Visit on 06/05/2024   Component Date Value Ref Range Status    POC Amphetamines 06/05/2024 Negative  Negative, Inconclusive Final    POC Barbiturates 06/05/2024 Negative  Negative, Inconclusive Final    POC Benzodiazepines 06/05/2024 Negative  Negative, Inconclusive Final    POC Cocaine 06/05/2024 Negative  Negative, Inconclusive Final    POC THC 06/05/2024 Negative  Negative, Inconclusive Final    POC Methadone 06/05/2024 Negative  Negative, Inconclusive Final    POC Methamphetamine 06/05/2024 Negative  Negative, Inconclusive Final    POC Opiates 06/05/2024 Presumptive Positive (A)  Negative, Inconclusive Final    POC Oxycodone 06/05/2024 Negative  Negative, Inconclusive Final    POC Phencyclidine 06/05/2024 Negative  Negative, Inconclusive Final    POC Methylenedioxymethamphetamine * 06/05/2024 Negative  Negative, Inconclusive Final    POC Tricyclic Antidepressants 06/05/2024 Negative  Negative, Inconclusive Final    POC Buprenorphine 06/05/2024 Negative   Final     Acceptable 06/05/2024 Yes   Final    POC Temperature (Urine) 06/05/2024 92   Final   Office Visit on 05/06/2024   Component Date Value Ref Range Status    POC Glucose 05/06/2024 73  70 - 110 MG/DL Final    Sodium 05/06/2024 137  136 - 145 mmol/L Final    Potassium 05/06/2024 2.8 (L)  3.5 - 5.1 mmol/L Final    Chloride 05/06/2024 99  98 - 107 mmol/L Final    CO2 05/06/2024 35 (H)  21 - 32 mmol/L Final    Anion Gap 05/06/2024 6 (L)  7 - 16 mmol/L Final    Glucose 05/06/2024 62 (L)  74 - 106 mg/dL Final    BUN 05/06/2024 6 (L)  7 - 18 mg/dL Final    Creatinine 05/06/2024 0.90  0.55 - 1.02 mg/dL Final    BUN/Creatinine Ratio 05/06/2024 7  6 - 20 Final    Calcium 05/06/2024 8.8  8.5 - 10.1 mg/dL Final    Total Protein 05/06/2024 8.0  6.4 -  8.2 g/dL Final    Albumin 05/06/2024 3.4 (L)  3.5 - 5.0 g/dL Final    Globulin 05/06/2024 4.6 (H)  2.0 - 4.0 g/dL Final    A/G Ratio 05/06/2024 0.7   Final    Bilirubin, Total 05/06/2024 0.6  >0.0 - 1.2 mg/dL Final    Alk Phos 05/06/2024 116 (H)  41 - 108 U/L Final    ALT 05/06/2024 49  13 - 56 U/L Final    AST 05/06/2024 39 (H)  15 - 37 U/L Final    eGFR 05/06/2024 76  >=60 mL/min/1.73m2 Final    Hemoglobin A1C 05/06/2024 6.8 (H)  4.5 - 6.6 % Final    Estimated Average Glucose 05/06/2024 148  mg/dL Final    Triglycerides 05/06/2024 93  35 - 150 mg/dL Final    Cholesterol 05/06/2024 168  0 - 200 mg/dL Final    HDL Cholesterol 05/06/2024 33 (L)  40 - 60 mg/dL Final    Cholesterol/HDL Ratio (Risk Factor) 05/06/2024 5.1   Final    Non-HDL 05/06/2024 135  mg/dL Final    LDL Calculated 05/06/2024 116  mg/dL Final    LDL/HDL 05/06/2024 3.5   Final    VLDL 05/06/2024 19  mg/dL Final    WBC 05/06/2024 4.83  4.50 - 11.00 K/uL Final    RBC 05/06/2024 4.27  4.20 - 5.40 M/uL Final    Hemoglobin 05/06/2024 12.4  12.0 - 16.0 g/dL Final    Hematocrit 05/06/2024 37.5 (L)  38.0 - 47.0 % Final    MCV 05/06/2024 87.8  80.0 - 96.0 fL Final    MCH 05/06/2024 29.0  27.0 - 31.0 pg Final    MCHC 05/06/2024 33.1  32.0 - 36.0 g/dL Final    RDW 05/06/2024 15.0 (H)  11.5 - 14.5 % Final    Platelet Count 05/06/2024 287  150 - 400 K/uL Final    MPV 05/06/2024 10.4  9.4 - 12.4 fL Final    Neutrophils % 05/06/2024 39.3 (L)  53.0 - 65.0 % Final    Lymphocytes % 05/06/2024 48.2 (H)  27.0 - 41.0 % Final    Monocytes % 05/06/2024 7.0 (H)  2.0 - 6.0 % Final    Eosinophils % 05/06/2024 3.9  1.0 - 4.0 % Final    Basophils % 05/06/2024 1.4 (H)  0.0 - 1.0 % Final    Immature Granulocytes % 05/06/2024 0.2  0.0 - 0.4 % Final    nRBC, Auto 05/06/2024 0.0  <=0.0 % Final    Neutrophils, Abs 05/06/2024 1.89  1.80 - 7.70 K/uL Final    Lymphocytes, Absolute 05/06/2024 2.33  1.00 - 4.80 K/uL Final    Monocytes, Absolute 05/06/2024 0.34  0.00 - 0.80 K/uL Final     Eosinophils, Absolute 05/06/2024 0.19  0.00 - 0.50 K/uL Final    Basophils, Absolute 05/06/2024 0.07  0.00 - 0.20 K/uL Final    Immature Granulocytes, Absolute 05/06/2024 0.01  0.00 - 0.04 K/uL Final    nRBC, Absolute 05/06/2024 0.00  <=0.00 x10e3/uL Final    Diff Type 05/06/2024 Auto   Final         No orders of the defined types were placed in this encounter.        Requested Prescriptions     Signed Prescriptions Disp Refills    HYDROcodone-acetaminophen (NORCO)  mg per tablet 60 tablet 0     Sig: Take 1 tablet by mouth every 12 (twelve) hours as needed for Pain.    HYDROcodone-acetaminophen (NORCO)  mg per tablet 60 tablet 0     Sig: Take 1 tablet by mouth every 12 (twelve) hours as needed for Pain.       Assessment:     1. Rheumatoid arthritis involving multiple sites with positive rheumatoid factor    2. Lumbosacral spondylosis without myelopathy               A's of Opioid Risk Assessment  Activity:Patient can perform ADL.   Analgesia:Patients pain is partially controlled by current medication. Patient has tried OTC medications such as Tylenol and Ibuprofen with out relief.   Adverse Effects: Patient denies constipation or sedation.  Aberrant Behavior:  reviewed with no aberrant drug seeking/taking behavior.  Overdose reversal drug naloxone discussed    Drug screen reviewed      X-ray lumbar spine [facility] November 11, 2020, multiple level degenerative changes no instability noted with flexion-extension no fracture noted      Plan:     Nellie Morgan February 2023    Follows rheumatology rheumatoid arthritis [facility]    She would like to continue with current medications     She states current medication does help control her chronic discomfort     She has no new complaints    Will continue home exercise program as directed    Continue current medication    Follow-up 2 months    Dr. Claire, October 2024    Bring original prescription medication bottles/container/box with labels to each  visit

## 2024-07-31 ENCOUNTER — OFFICE VISIT (OUTPATIENT)
Dept: PAIN MEDICINE | Facility: CLINIC | Age: 54
End: 2024-07-31
Payer: MEDICARE

## 2024-07-31 ENCOUNTER — EXTERNAL CHRONIC CARE MANAGEMENT (OUTPATIENT)
Dept: PRIMARY CARE CLINIC | Facility: CLINIC | Age: 54
End: 2024-07-31
Payer: MEDICARE

## 2024-07-31 VITALS
RESPIRATION RATE: 20 BRPM | BODY MASS INDEX: 41.77 KG/M2 | DIASTOLIC BLOOD PRESSURE: 86 MMHG | SYSTOLIC BLOOD PRESSURE: 152 MMHG | HEIGHT: 69 IN | HEART RATE: 78 BPM | WEIGHT: 282 LBS

## 2024-07-31 DIAGNOSIS — M05.79 RHEUMATOID ARTHRITIS INVOLVING MULTIPLE SITES WITH POSITIVE RHEUMATOID FACTOR: Primary | Chronic | ICD-10-CM

## 2024-07-31 DIAGNOSIS — M47.817 LUMBOSACRAL SPONDYLOSIS WITHOUT MYELOPATHY: Chronic | ICD-10-CM

## 2024-07-31 PROCEDURE — 99215 OFFICE O/P EST HI 40 MIN: CPT | Mod: PBBFAC | Performed by: PHYSICIAN ASSISTANT

## 2024-07-31 PROCEDURE — 3044F HG A1C LEVEL LT 7.0%: CPT | Mod: CPTII,,, | Performed by: PHYSICIAN ASSISTANT

## 2024-07-31 PROCEDURE — 99214 OFFICE O/P EST MOD 30 MIN: CPT | Mod: S$PBB,,, | Performed by: PHYSICIAN ASSISTANT

## 2024-07-31 PROCEDURE — G0511 CCM/BHI BY RHC/FQHC 20MIN MO: HCPCS | Mod: ,,, | Performed by: FAMILY MEDICINE

## 2024-07-31 PROCEDURE — 3008F BODY MASS INDEX DOCD: CPT | Mod: CPTII,,, | Performed by: PHYSICIAN ASSISTANT

## 2024-07-31 PROCEDURE — 1159F MED LIST DOCD IN RCRD: CPT | Mod: CPTII,,, | Performed by: PHYSICIAN ASSISTANT

## 2024-07-31 PROCEDURE — 99999 PR PBB SHADOW E&M-EST. PATIENT-LVL V: CPT | Mod: PBBFAC,,, | Performed by: PHYSICIAN ASSISTANT

## 2024-07-31 PROCEDURE — 3077F SYST BP >= 140 MM HG: CPT | Mod: CPTII,,, | Performed by: PHYSICIAN ASSISTANT

## 2024-07-31 PROCEDURE — 3079F DIAST BP 80-89 MM HG: CPT | Mod: CPTII,,, | Performed by: PHYSICIAN ASSISTANT

## 2024-07-31 RX ORDER — HYDROCODONE BITARTRATE AND ACETAMINOPHEN 10; 325 MG/1; MG/1
1 TABLET ORAL EVERY 12 HOURS PRN
Qty: 60 TABLET | Refills: 0 | Status: SHIPPED | OUTPATIENT
Start: 2024-09-06 | End: 2024-10-06

## 2024-07-31 RX ORDER — HYDROCODONE BITARTRATE AND ACETAMINOPHEN 10; 325 MG/1; MG/1
1 TABLET ORAL EVERY 12 HOURS PRN
Qty: 60 TABLET | Refills: 0 | Status: SHIPPED | OUTPATIENT
Start: 2024-08-07 | End: 2024-09-06

## 2024-08-31 ENCOUNTER — EXTERNAL CHRONIC CARE MANAGEMENT (OUTPATIENT)
Dept: PRIMARY CARE CLINIC | Facility: CLINIC | Age: 54
End: 2024-08-31
Payer: MEDICARE

## 2024-08-31 PROCEDURE — G0511 CCM/BHI BY RHC/FQHC 20MIN MO: HCPCS | Mod: ,,, | Performed by: FAMILY MEDICINE

## 2024-09-11 ENCOUNTER — PATIENT OUTREACH (OUTPATIENT)
Facility: HOSPITAL | Age: 54
End: 2024-09-11
Payer: MEDICARE

## 2024-09-11 NOTE — PROGRESS NOTES
Population Health Chart Review & Patient Outreach Details    Updates Requested / Reviewed:  []  Care Team Updated  []  Care Everywhere Updated & Reviewed  []  [facility] Reviewed  []   Reviewed  []  MIIX Reviewed    Health Maintenance Topics Addressed and Outreach Outcomes / Actions Taken:           HbA1c & Other Labs []  Overdue Lab(s) Ordered    []  Overdue Lab(s) Scheduled    []  External Records Uploaded & Care Team Updated if Applicable    [x]  Primary Care Follow Up Visit Scheduled **appointment notes updated**    []  Reminded Patient to Complete A1c Home Test    []  Patient Declined Scheduling Labs or Will Call Back to Schedule    []  Patient Will Schedule with External Provider / Order Routed, & Care Team Updated if Applicable

## 2024-09-18 ENCOUNTER — OFFICE VISIT (OUTPATIENT)
Dept: FAMILY MEDICINE | Facility: CLINIC | Age: 54
End: 2024-09-18
Payer: COMMERCIAL

## 2024-09-18 ENCOUNTER — LAB VISIT (OUTPATIENT)
Dept: LAB | Facility: HOSPITAL | Age: 54
End: 2024-09-18
Attending: FAMILY MEDICINE
Payer: COMMERCIAL

## 2024-09-18 VITALS
OXYGEN SATURATION: 99 % | HEART RATE: 68 BPM | SYSTOLIC BLOOD PRESSURE: 116 MMHG | BODY MASS INDEX: 31.12 KG/M2 | WEIGHT: 198.31 LBS | DIASTOLIC BLOOD PRESSURE: 66 MMHG | HEIGHT: 67 IN

## 2024-09-18 DIAGNOSIS — Z12.11 ENCOUNTER FOR SCREENING COLONOSCOPY: ICD-10-CM

## 2024-09-18 DIAGNOSIS — Z79.899 ENCOUNTER FOR LONG-TERM (CURRENT) USE OF OTHER MEDICATIONS: ICD-10-CM

## 2024-09-18 DIAGNOSIS — Z11.59 NEED FOR HEPATITIS C SCREENING TEST: ICD-10-CM

## 2024-09-18 DIAGNOSIS — Z00.00 ENCOUNTER FOR MEDICAL EXAMINATION TO ESTABLISH CARE: ICD-10-CM

## 2024-09-18 DIAGNOSIS — Z00.00 ENCOUNTER FOR MEDICAL EXAMINATION TO ESTABLISH CARE: Primary | ICD-10-CM

## 2024-09-18 DIAGNOSIS — Z11.4 SCREENING FOR HIV (HUMAN IMMUNODEFICIENCY VIRUS): ICD-10-CM

## 2024-09-18 DIAGNOSIS — Z13.220 LIPID SCREENING: ICD-10-CM

## 2024-09-18 DIAGNOSIS — K21.9 GASTROESOPHAGEAL REFLUX DISEASE WITHOUT ESOPHAGITIS: ICD-10-CM

## 2024-09-18 DIAGNOSIS — M72.2 PLANTAR FASCIITIS: ICD-10-CM

## 2024-09-18 DIAGNOSIS — Z23 NEED FOR VACCINATION: ICD-10-CM

## 2024-09-18 LAB
ALBUMIN SERPL BCP-MCNC: 3.5 G/DL (ref 3.5–5.2)
ALP SERPL-CCNC: 76 U/L (ref 55–135)
ALT SERPL W/O P-5'-P-CCNC: 17 U/L (ref 10–44)
ANION GAP SERPL CALC-SCNC: 6 MMOL/L (ref 8–16)
AST SERPL-CCNC: 23 U/L (ref 10–40)
BILIRUB SERPL-MCNC: 0.3 MG/DL (ref 0.1–1)
BUN SERPL-MCNC: 15 MG/DL (ref 6–20)
CALCIUM SERPL-MCNC: 9.4 MG/DL (ref 8.7–10.5)
CHLORIDE SERPL-SCNC: 107 MMOL/L (ref 95–110)
CHOLEST SERPL-MCNC: 243 MG/DL (ref 120–199)
CHOLEST/HDLC SERPL: 3.3 {RATIO} (ref 2–5)
CO2 SERPL-SCNC: 23 MMOL/L (ref 23–29)
CREAT SERPL-MCNC: 0.8 MG/DL (ref 0.5–1.4)
ERYTHROCYTE [DISTWIDTH] IN BLOOD BY AUTOMATED COUNT: 14.7 % (ref 11.5–14.5)
EST. GFR  (NO RACE VARIABLE): >60 ML/MIN/1.73 M^2
ESTIMATED AVG GLUCOSE: 103 MG/DL (ref 68–131)
GLUCOSE SERPL-MCNC: 82 MG/DL (ref 70–110)
HBA1C MFR BLD: 5.2 % (ref 4–5.6)
HCT VFR BLD AUTO: 37 % (ref 37–48.5)
HCV AB SERPL QL IA: NORMAL
HDLC SERPL-MCNC: 73 MG/DL (ref 40–75)
HDLC SERPL: 30 % (ref 20–50)
HGB BLD-MCNC: 11.7 G/DL (ref 12–16)
HIV 1+2 AB+HIV1 P24 AG SERPL QL IA: NORMAL
LDLC SERPL CALC-MCNC: 156.6 MG/DL (ref 63–159)
MCH RBC QN AUTO: 30.7 PG (ref 27–31)
MCHC RBC AUTO-ENTMCNC: 31.6 G/DL (ref 32–36)
MCV RBC AUTO: 97 FL (ref 82–98)
NONHDLC SERPL-MCNC: 170 MG/DL
PLATELET # BLD AUTO: 195 K/UL (ref 150–450)
PMV BLD AUTO: 12.9 FL (ref 9.2–12.9)
POTASSIUM SERPL-SCNC: 4.6 MMOL/L (ref 3.5–5.1)
PROT SERPL-MCNC: 7.2 G/DL (ref 6–8.4)
RBC # BLD AUTO: 3.81 M/UL (ref 4–5.4)
SODIUM SERPL-SCNC: 136 MMOL/L (ref 136–145)
TRIGL SERPL-MCNC: 67 MG/DL (ref 30–150)
TSH SERPL DL<=0.005 MIU/L-ACNC: 1.02 UIU/ML (ref 0.4–4)
WBC # BLD AUTO: 3.1 K/UL (ref 3.9–12.7)

## 2024-09-18 PROCEDURE — 87389 HIV-1 AG W/HIV-1&-2 AB AG IA: CPT | Performed by: FAMILY MEDICINE

## 2024-09-18 PROCEDURE — 3074F SYST BP LT 130 MM HG: CPT | Mod: CPTII,S$GLB,, | Performed by: FAMILY MEDICINE

## 2024-09-18 PROCEDURE — 83036 HEMOGLOBIN GLYCOSYLATED A1C: CPT | Performed by: FAMILY MEDICINE

## 2024-09-18 PROCEDURE — 3044F HG A1C LEVEL LT 7.0%: CPT | Mod: CPTII,S$GLB,, | Performed by: FAMILY MEDICINE

## 2024-09-18 PROCEDURE — 90471 IMMUNIZATION ADMIN: CPT | Mod: S$GLB,,, | Performed by: FAMILY MEDICINE

## 2024-09-18 PROCEDURE — 90750 HZV VACC RECOMBINANT IM: CPT | Mod: S$GLB,,, | Performed by: FAMILY MEDICINE

## 2024-09-18 PROCEDURE — 99999 PR PBB SHADOW E&M-EST. PATIENT-LVL V: CPT | Mod: PBBFAC,,, | Performed by: FAMILY MEDICINE

## 2024-09-18 PROCEDURE — 3078F DIAST BP <80 MM HG: CPT | Mod: CPTII,S$GLB,, | Performed by: FAMILY MEDICINE

## 2024-09-18 PROCEDURE — 85027 COMPLETE CBC AUTOMATED: CPT | Performed by: FAMILY MEDICINE

## 2024-09-18 PROCEDURE — 80061 LIPID PANEL: CPT | Performed by: FAMILY MEDICINE

## 2024-09-18 PROCEDURE — 3008F BODY MASS INDEX DOCD: CPT | Mod: CPTII,S$GLB,, | Performed by: FAMILY MEDICINE

## 2024-09-18 PROCEDURE — 86803 HEPATITIS C AB TEST: CPT | Performed by: FAMILY MEDICINE

## 2024-09-18 PROCEDURE — 36415 COLL VENOUS BLD VENIPUNCTURE: CPT | Mod: PO | Performed by: FAMILY MEDICINE

## 2024-09-18 PROCEDURE — 99386 PREV VISIT NEW AGE 40-64: CPT | Mod: 25,S$GLB,, | Performed by: FAMILY MEDICINE

## 2024-09-18 PROCEDURE — 1159F MED LIST DOCD IN RCRD: CPT | Mod: CPTII,S$GLB,, | Performed by: FAMILY MEDICINE

## 2024-09-18 PROCEDURE — 80053 COMPREHEN METABOLIC PANEL: CPT | Performed by: FAMILY MEDICINE

## 2024-09-18 PROCEDURE — 84443 ASSAY THYROID STIM HORMONE: CPT | Performed by: FAMILY MEDICINE

## 2024-09-18 NOTE — PROGRESS NOTES
This note is specifically for wellness visit performed today.   WELLNESS EXAM      Patient ID: Matilda Decker is a 54 y.o. female with  has a past medical history of Abnormal Pap smear.   Chief Complaint:  Encounter for wellness exam    Well Adult Physical: Patient here for a comprehensive physical exam.The patient reports Chronic problems.  History of Present Illness  The patient is a pleasant 54-year-old female who is establishing care for primary care. She does not have a previous PCP and has several specialists, including OBGYN, dermatology, and podiatry.    She is due for blood work, including a full panel with CBC, CMP, TSH, lipid panel, and A1c, as well as screening for hepatitis C and HIV. She reports no exposure to these infections and is okay with the tests. She is also due for a shingles vaccination with Shingrix, and the risks and benefits of the vaccination were discussed. She is currently not taking any medications. She is due for a colonoscopy and agrees to undergo the procedure for colon cancer screening.    She has been under the care of a gynecologist and a dermatologist. She is in good health overall and typically receives the influenza vaccine but has not had a tetanus vaccine since 2010. She is current with her mammogram and Pap smear. She has HPV and herpes, contracted from her , but has never taken antivirals as she does not frequently have outbreaks. She is curious about the potential impact of the shingles vaccine on her conditions. She has never experienced fever blisters, and her outbreaks are vaginal. She has never taken Valtrex or acyclovir.    She has been diagnosed with plantar fasciitis and experiences constant pain in her right foot, which has persisted for 5 years. She uses inserts and performs stretches as recommended by Dr. Stephenson. She has not received injections. An x-ray revealed a bone spur on her heel, which Dr. Stephenson believes should not be causing her pain. She  experiences tightness on the entire right side of her body and manages her condition with home physical therapy, including sleeping with a boot, exercising, and walking extensively. She underwent an MRI in her 30s due to her mother's history of Sjogren's disease. She occasionally experiences sciatic nerve pain on the right side. Initially, she walked on the side of her foot to alleviate pain, but the inserts have allowed her to place her foot flat. She used to experience mild hip pain, which has improved. The pain was so severe at times that it would radiate up her leg when she walked.    FAMILY HISTORY  She denies any family history of colon cancer, breast cancer, or ovarian cancer. Her mother had Sjogren's disease.     Do you take any herbs or supplements that were not prescribed by a doctor? no   Are you taking calcium supplements? no    History: OBGYN:   LMP: Patient's last menstrual period was 09/16/2024 (exact date).   Component 6 mo ago   Final Pathologic Diagnosis Specimen Adequacy  Satisfactory for interpretation. Endocervical component is present.    [place] Category  Negative for intraepithelial lesion or malignancy.  Atrophic smear.  Inflammation present.   Comment: Interp By CÉSAR Barry (ASCP), Signed on 03/13/2024 at 07:04     MMG:  No relevant family history has been documented.   Narrative & Impression  Result:  Mammo Digital Screening Bilat w/ Dennis     History:  Patient is 53 y.o. and is seen for a screening mammogram.        Films Compared:  Compared to: 01/17/2023 Mammo Digital Screening Bilat w/ Dennis, 10/13/2021 Mammo Digital Screening Bilat w/ Dennis, 09/29/2020 Mammo Digital Screening Bilat w/ Dennis, 08/16/2019 Mammo Digital Diagnostic Bilat w/ Dennis, and 06/21/2018 Mammo Digital Diagnostic Bilat with Dennis      Findings:  This procedure was performed using tomosynthesis.   Computer-aided detection was utilized in the interpretation of this examination.     The breasts have scattered  areas of fibroglandular density. There is no evidence of suspicious masses, microcalcifications or architectural distortion.     Impression:   No mammographic evidence of malignancy.     BI-RADS Category 1: Negative     Recommendation:  Routine screening mammogram in 1 year is recommended.     Your estimated lifetime risk of breast cancer (to age 85) based on Tyrer-Cuzick risk assessment model is 6.02 %.  According to the American Cancer Society, patients with a lifetime breast cancer risk of 20% or higher might benefit from supplemental screening tests, such as screening breast MRI.                       Exam Ended: 03/06/24 10:48 CST           PAP: 3/13/2024  Colon cancer screening:   Patient is due for colon cancer screening.  Patient agrees to colonoscopy.    Health Maintenance Topics with due status: Not Due       Topic Last Completion Date    Cervical Cancer Screening 03/06/2024    Mammogram 03/06/2024      ==============================================  History reviewed.   Health Maintenance Due   Topic Date Due    Hepatitis C Screening  Never done    HIV Screening  Never done    TETANUS VACCINE  Never done    Hemoglobin A1c (Diabetic Prevention Screening)  Never done    Lipid Panel  01/13/2014    Colorectal Cancer Screening  Never done    Shingles Vaccine (1 of 2) Never done    Influenza Vaccine (1) Never done       Past Medical History:  Past Medical History:   Diagnosis Date    Abnormal Pap smear     repeat pap     Past Surgical History:   Procedure Laterality Date    CERVICAL BIOPSY  W/ LOOP ELECTRODE EXCISION      COLPOSCOPY       Review of patient's allergies indicates:  No Known Allergies  No current outpatient medications on file prior to visit.     No current facility-administered medications on file prior to visit.     Social History     Socioeconomic History    Marital status:    Tobacco Use    Smoking status: Never     Passive exposure: Never    Smokeless tobacco: Never   Substance and  Sexual Activity    Alcohol use: No    Drug use: No    Sexual activity: Not Currently     Partners: Male     Birth control/protection: OCP     Family History   Problem Relation Name Age of Onset    Breast cancer Neg Hx      Ovarian cancer Neg Hx         Review of Systems   Constitutional:  Negative for chills, fatigue, fever and unexpected weight change.   HENT:  Negative for ear pain and sore throat.    Eyes:  Negative for redness and visual disturbance.   Respiratory:  Negative for cough and shortness of breath.    Cardiovascular:  Negative for chest pain and palpitations.   Gastrointestinal:  Negative for nausea and vomiting.   Genitourinary:  Negative for difficulty urinating and hematuria.   Musculoskeletal:  Positive for arthralgias. Negative for myalgias.   Skin:  Negative for rash and wound.   Neurological:  Negative for weakness and headaches.   Psychiatric/Behavioral:  Negative for sleep disturbance. The patient is not nervous/anxious.       Objective:    Nursing note and vitals reviewed.  Vitals:    09/18/24 0756   BP: 116/66   Pulse: 68     Body mass index is 31.06 kg/m².   Physical Exam  Vitals and nursing note reviewed.   Constitutional:       General: She is not in acute distress.     Appearance: She is well-developed. She is not ill-appearing, toxic-appearing or diaphoretic.   HENT:      Head: Normocephalic and atraumatic.      Right Ear: Hearing and external ear normal.      Left Ear: Hearing and external ear normal.      Nose: Nose normal. No rhinorrhea.   Eyes:      General: Lids are normal.      Extraocular Movements: Extraocular movements intact.      Conjunctiva/sclera: Conjunctivae normal.      Pupils: Pupils are equal, round, and reactive to light.   Neck:      Vascular: No carotid bruit.   Cardiovascular:      Rate and Rhythm: Normal rate.      Pulses: Normal pulses.   Pulmonary:      Effort: Pulmonary effort is normal. No respiratory distress.      Breath sounds: Normal breath sounds.    Abdominal:      General: Bowel sounds are normal. There is no distension.      Palpations: Abdomen is soft. There is no mass.   Musculoskeletal:         General: Tenderness present. Normal range of motion.      Cervical back: Normal range of motion and neck supple.   Lymphadenopathy:      Cervical: No cervical adenopathy.   Skin:     General: Skin is warm and dry.      Capillary Refill: Capillary refill takes less than 2 seconds.      Coloration: Skin is not pale.   Neurological:      General: No focal deficit present.      Mental Status: She is alert and oriented to person, place, and time. She is not disoriented.      Cranial Nerves: No cranial nerve deficit.      Motor: No weakness.      Gait: Gait normal.   Psychiatric:         Attention and Perception: She is attentive.         Mood and Affect: Mood normal. Mood is not anxious or depressed.         Speech: Speech is not rapid and pressured or slurred.         Behavior: Behavior normal. Behavior is not agitated, aggressive or hyperactive. Behavior is cooperative.         Thought Content: Thought content normal. Thought content is not paranoid or delusional. Thought content does not include homicidal or suicidal ideation. Thought content does not include homicidal or suicidal plan.         Cognition and Memory: Memory is not impaired.         Judgment: Judgment normal.       Office Visit on 03/06/2024   Component Date Value Ref Range Status    Final Pathologic Diagnosis 03/06/2024    Final                    Value:Specimen Adequacy  Satisfactory for interpretation. Endocervical component is present.    [place] Category  Negative for intraepithelial lesion or malignancy.  Atrophic smear.  Inflammation present.      Interp By CÉSAR Barry (ASCP), Signed on 03/13/2024 at 07:04    Disclaimer 03/06/2024    Final                    Value:The Pap smear is a screening test that aids in the detection of cervical cancer and cancer precursors. Both false positive  and false negative results can occur. The test should be used at regular intervals, and positive results should be confirmed before   definitive therapy.  This liquid based specimen is processed using the  or  Thin PrepPAP System. This specimen has been analyzed by the ThinPrep Imaging System ([employer]), an automated imaging and review system which assists the laboratory in evaluating   cells on ThinPrep PAP tests. Following automated imaging, selected fields from every slide are reviewed by a cytotechnologist and/or pathologist.     Screening was performed at [facility], [address], [place] [address].        Assessment / Plan:      1.  ANNUAL WELLNESS EXAM -patient here for annual wellness exam.  Labs ordered.  Health maintenance was reviewed and ordered.  Medications were reviewed and reconciled.   Anticipatory guidance: Don't smoke.  Healthy diet and regular exercise recommended. Vaccine recommendations discussed.  See orders.  Reviewed Anticipatory guidance, risk factor reduction interventions and counseling, Complete history , physical was completed today.  Complete and thorough medication reconciliation was performed.  Discussed risks and benefits of medications.  Advised patient on orders and health maintenance.  We discussed old records and old labs if available.  Will request any records not available through epic.  Continue current medications listed on your summary sheet.  Assessment & Plan  1. Establishment of care.  A comprehensive blood work panel will be conducted today to assess her overall health status, including CBC, CMP, TSH, lipid panel, A1c, and screening for hepatitis C and HIV. She reports no exposure to hepatitis C or HIV. She is currently not taking any medications.    2. Herpes Simplex Virus (HSV).  She has a history of herpes, which she contracted from her . She does not frequently experience outbreaks and  has never taken antivirals. She was informed that the Shingrix vaccine might potentially trigger an outbreak due to the body's stress response. If an outbreak occurs, she can take Valtrex or acyclovir as needed.  Discussed condition course and signs and symptoms to expect.  Patient advised take anti-inflammatories and or Tylenol for pain or fever.  ER precautions.  Call MD or follow-up to clinic if not improving or worsening symptoms.      3. Human Papillomavirus (HPV).  She has a history of HPV. No current treatment is required as there are no active issues.  Follow-up with OBDAJUANN    4. Plantar fasciitis.  She has been experiencing pain in her right foot for 5 years, exacerbated by a bone spur. She has been using inserts and performing stretches. Physical therapy has been recommended to help alleviate the pain and tightness. She will be referred to a physical therapy center in [place].    5. Health Maintenance.  She is due for a shingles vaccination with Shingrix. The first dose will be administered today, and the second dose will be scheduled in 2 months. She is also due for a flu shot and a tetanus shot, which includes a pertussis booster. She agrees to receive the flu shot today. She is also due for a colonoscopy for colon cancer screening and agrees to schedule it.        All questions were answered. Patient had no further concerns. Advised of Wellness plan. Follow up in 1 year for ANNUAL WELLNESS EXAM    Orders Placed This Encounter   Procedures    Lipid Panel     Standing Status:   Future     Standing Expiration Date:   11/17/2025    Hepatitis C Antibody     Standing Status:   Future     Standing Expiration Date:   11/17/2025    HIV 1/2 Ag/Ab (4th Gen)     Standing Status:   Future     Standing Expiration Date:   11/17/2025    Hemoglobin A1C     Standing Status:   Future     Standing Expiration Date:   11/17/2025    CBC Without Differential     Standing Status:   Future     Standing Expiration Date:    11/17/2025    Comprehensive Metabolic Panel     Standing Status:   Future     Standing Expiration Date:   11/17/2025    TSH     Standing Status:   Future     Standing Expiration Date:   11/17/2025    Ambulatory referral/consult to Endo Procedure      Standing Status:   Future     Standing Expiration Date:   3/31/2025     Referral Priority:   Routine     Referral Type:   Consultation     Number of Visits Requested:   1     Medications Ordered This Encounter   Medications    varicella-zoster gE vac,2 of 2 SusR 0.5 mL    Discussed risk and benefits of Shingrix vaccination.  Patient agrees to proceed.      Ramo Bashir MD

## 2024-09-18 NOTE — PATIENT INSTRUCTIONS
Follow up in 1 year (on 9/18/2025), or if symptoms worsen or fail to improve, for Annual Wellness Exam.     Dear patient,   As a result of recent federal legislation (The Federal Cures Act), you may receive lab or pathology results from your visit in your [facility] account before your physician is able to contact you. Your physician or their representative will relay the results to you with their recommendations at their soonest availability.     If no improvement in symptoms or symptoms worsen, please be advised to call MD, follow-up at clinic and/or go to ER if becomes severe.    Ramo Bashir M.D.        We Offer TELEHEALTH & Same Day Appointments!   Book your Telehealth appointment with me through my nurse or   Clinic appointments on [employer]!    [address] [place] [address]    Office: [phone]   FAX: [phone]    Check out my Facebook Page and Follow Me at: [email or web address]    Check out my website at [employer] by clicking on: [email or web address]    To Schedule appointments online, go to [employer]: [email or web address]

## 2024-09-19 RX ORDER — ESOMEPRAZOLE MAGNESIUM 40 MG/1
40 CAPSULE, DELAYED RELEASE ORAL
Qty: 90 CAPSULE | Refills: 1 | Status: SHIPPED | OUTPATIENT
Start: 2024-09-19 | End: 2025-09-19

## 2024-09-22 DIAGNOSIS — D53.9 NUTRITIONAL ANEMIA: Primary | ICD-10-CM

## 2024-09-22 NOTE — PROGRESS NOTES
Make follow-up lab appointment per recommendation below.  Check to see if patient has seen the results through my chart.  If not then,  #CALL THE PATIENT# to discuss results/see if they have questions and document verification of contact. Make F/U appt if needed. [phone]    #My interpretation that was sent to them through [employer]:  Matilda, I have reviewed your recent blood work.     Hepatitis-C screening is nonreactive.  HIV screening is nonreactive.  Your complete blood count is abnormal but stable from previous levels.  I recommend Hematology consult if you have never seen them before.  Consider checking iron, ferritin, folate and B12 levels on next blood work..    Your metabolic panel which shows your glucose, kidney function, electrolytes, and liver function is normal.   Thyroid study is normal.   Your cholesterol is abnormal.  Low risk score.  I recommend a Mediterranean diet and increase in exercise.  Recheck lipid panel in six months.  Your hemoglobin A1c is normal.  This test is gold standard screening test for diabetes.  It is a measures 3 months of your average blood sugar.    The 10-year ASCVD risk score (Ramírez ROMERO, et al., 2019) is: 2.1%  =========================  Also please address any outstanding health maintenance that may be due: TETANUS VACCINE Never done  Colorectal Cancer Screening Never done  Influenza Vaccine(1) Never done

## 2024-09-25 ENCOUNTER — PATIENT MESSAGE (OUTPATIENT)
Dept: FAMILY MEDICINE | Facility: CLINIC | Age: 54
End: 2024-09-25
Payer: COMMERCIAL

## 2024-09-30 ENCOUNTER — EXTERNAL CHRONIC CARE MANAGEMENT (OUTPATIENT)
Dept: PRIMARY CARE CLINIC | Facility: CLINIC | Age: 54
End: 2024-09-30
Payer: MEDICARE

## 2024-09-30 PROCEDURE — G0511 CCM/BHI BY RHC/FQHC 20MIN MO: HCPCS | Mod: ,,, | Performed by: FAMILY MEDICINE

## 2024-10-02 ENCOUNTER — CLINICAL SUPPORT (OUTPATIENT)
Dept: REHABILITATION | Facility: HOSPITAL | Age: 54
End: 2024-10-02
Attending: FAMILY MEDICINE
Payer: COMMERCIAL

## 2024-10-02 ENCOUNTER — HOSPITAL ENCOUNTER (OUTPATIENT)
Dept: PREADMISSION TESTING | Facility: HOSPITAL | Age: 54
Discharge: HOME | End: 2024-10-02
Attending: FAMILY MEDICINE
Payer: COMMERCIAL

## 2024-10-02 ENCOUNTER — OFFICE VISIT (OUTPATIENT)
Dept: PAIN MEDICINE | Facility: CLINIC | Age: 54
End: 2024-10-02
Payer: MEDICARE

## 2024-10-02 VITALS
HEIGHT: 69 IN | HEART RATE: 75 BPM | BODY MASS INDEX: 40.58 KG/M2 | SYSTOLIC BLOOD PRESSURE: 135 MMHG | DIASTOLIC BLOOD PRESSURE: 90 MMHG | WEIGHT: 274 LBS

## 2024-10-02 DIAGNOSIS — M54.12 CERVICAL RADICULOPATHY: Chronic | ICD-10-CM

## 2024-10-02 DIAGNOSIS — G89.4 CHRONIC PAIN SYNDROME: Chronic | ICD-10-CM

## 2024-10-02 DIAGNOSIS — M72.2 PLANTAR FASCIITIS: ICD-10-CM

## 2024-10-02 DIAGNOSIS — M05.79 RHEUMATOID ARTHRITIS INVOLVING MULTIPLE SITES WITH POSITIVE RHEUMATOID FACTOR: Chronic | ICD-10-CM

## 2024-10-02 DIAGNOSIS — G89.29 CHRONIC LEFT SHOULDER PAIN: Chronic | ICD-10-CM

## 2024-10-02 DIAGNOSIS — Z78.9 IMPAIRED MOBILITY AND ACTIVITIES OF DAILY LIVING: Primary | Chronic | ICD-10-CM

## 2024-10-02 DIAGNOSIS — Z12.11 ENCOUNTER FOR SCREENING COLONOSCOPY: Primary | ICD-10-CM

## 2024-10-02 DIAGNOSIS — Z74.09 IMPAIRED MOBILITY AND ACTIVITIES OF DAILY LIVING: Primary | Chronic | ICD-10-CM

## 2024-10-02 DIAGNOSIS — M25.512 CHRONIC LEFT SHOULDER PAIN: Chronic | ICD-10-CM

## 2024-10-02 DIAGNOSIS — Z79.899 ENCOUNTER FOR LONG-TERM (CURRENT) USE OF MEDICATIONS: Primary | ICD-10-CM

## 2024-10-02 LAB

## 2024-10-02 PROCEDURE — 3080F DIAST BP >= 90 MM HG: CPT | Mod: CPTII,,, | Performed by: PAIN MEDICINE

## 2024-10-02 PROCEDURE — 97112 NEUROMUSCULAR REEDUCATION: CPT | Mod: PN | Performed by: GENERAL ACUTE CARE HOSPITAL

## 2024-10-02 PROCEDURE — 99999PBSHW POCT URINE DRUG SCREEN PRESUMP: Mod: PBBFAC,,,

## 2024-10-02 PROCEDURE — 99214 OFFICE O/P EST MOD 30 MIN: CPT | Mod: S$PBB,,, | Performed by: PAIN MEDICINE

## 2024-10-02 PROCEDURE — 80305 DRUG TEST PRSMV DIR OPT OBS: CPT | Mod: PBBFAC | Performed by: PAIN MEDICINE

## 2024-10-02 PROCEDURE — 1159F MED LIST DOCD IN RCRD: CPT | Mod: CPTII,,, | Performed by: PAIN MEDICINE

## 2024-10-02 PROCEDURE — 3008F BODY MASS INDEX DOCD: CPT | Mod: CPTII,,, | Performed by: PAIN MEDICINE

## 2024-10-02 PROCEDURE — 97110 THERAPEUTIC EXERCISES: CPT | Mod: PN | Performed by: GENERAL ACUTE CARE HOSPITAL

## 2024-10-02 PROCEDURE — 99215 OFFICE O/P EST HI 40 MIN: CPT | Mod: PBBFAC | Performed by: PAIN MEDICINE

## 2024-10-02 PROCEDURE — 99999 PR PBB SHADOW E&M-EST. PATIENT-LVL V: CPT | Mod: PBBFAC,,, | Performed by: PAIN MEDICINE

## 2024-10-02 PROCEDURE — 3075F SYST BP GE 130 - 139MM HG: CPT | Mod: CPTII,,, | Performed by: PAIN MEDICINE

## 2024-10-02 PROCEDURE — 3044F HG A1C LEVEL LT 7.0%: CPT | Mod: CPTII,,, | Performed by: PAIN MEDICINE

## 2024-10-02 PROCEDURE — 97161 PT EVAL LOW COMPLEX 20 MIN: CPT | Mod: PN | Performed by: GENERAL ACUTE CARE HOSPITAL

## 2024-10-02 RX ORDER — SODIUM, POTASSIUM,MAG SULFATES 17.5-3.13G
1 SOLUTION, RECONSTITUTED, ORAL ORAL DAILY
Qty: 1 KIT | Refills: 0 | Status: SHIPPED | OUTPATIENT
Start: 2024-10-02 | End: 2024-10-04

## 2024-10-02 RX ORDER — HYDROCODONE BITARTRATE AND ACETAMINOPHEN 10; 325 MG/1; MG/1
1 TABLET ORAL EVERY 12 HOURS PRN
Qty: 60 TABLET | Refills: 0 | Status: SHIPPED | OUTPATIENT
Start: 2024-11-08 | End: 2024-12-08

## 2024-10-02 RX ORDER — HYDROCODONE BITARTRATE AND ACETAMINOPHEN 10; 325 MG/1; MG/1
1 TABLET ORAL EVERY 12 HOURS PRN
Qty: 60 TABLET | Refills: 0 | Status: SHIPPED | OUTPATIENT
Start: 2024-10-09 | End: 2024-11-08

## 2024-10-02 NOTE — PLAN OF CARE
[facility]   Physical Therapy Initial Evaluation    Name: Matilda Decker  Clinic Number: [id number]    Therapy Diagnosis:   Encounter Diagnoses   Name Primary?    Plantar fasciitis     Impaired mobility and activities of daily living Yes     Physician: Ramo Bashir MD    Physician Orders: PT Eval and Treat   Medical Diagnosis from Referral: Plantar Fasciitis  Evaluation Date: 10/2/2024  Authorization Period Expiration: 12/31/24  Plan of Care Expiration: 12/11/24 (10 weeks)  Progress Note Due: 11/2/24  Visit # / Visits authorized: 1/1 Evaluation   FOTO: 1/3 - foot  77/100    Precautions: Standard     Date: 10/2/2024   Time In: 0710  Time Out: 0800  Total Appointment Time: 50 minutes  Subjective   Date of onset: 5 years - chronic R foot plantar pain  History of current condition - Matilda reports: R foot pain with a 5 year history. Patient states that pain was insidious onset 10/10 that has now reduced to 3/10 due to all of the different home interventions including: orthotics, home stretching  Inserts/orthothotics- 5 years patient has about 5 pairs and wears them regularly  Pain with 10/10 but has reduced to 3/10   [employer]'s shoe at work   Patient reports doing stretches and rolling a ball under the foot - the rolling was causing more pain under the bottom of the foot    Surgical: [x]No []Yes (procedure:   )  Weight Bearing Status: WBAT  Dominant Extremity: R handed  Falls: none reported  Imaging: x-ray- heel spur R foot (note from MD states that he does not feel this is affecting patient's current pain)    Prior Therapy: no prior physical therapy   Social History: lives alone  Occupation: Retail - walking/standing alot  Prior Level of Function: independent   Current Level of Function: independent     Pain:  Current 0/10, worst 3/10, best 0/10   Location: R foot, R   Description: Aching, Dull, and Tight  Aggravating Factors: Standing and Walking  Easing Factors: relaxation, ice,  heating pad, hot bath, and rest  Cold water / ice occasionally, heat helps relax the muscles, calf massager and muscle roller to calf    Patients goals: to loosen up her R side of her body, learn new stretches and reduce pains in foot/ankle     Medical History:   Past Medical History:   Diagnosis Date    Abnormal Pap smear     repeat pap     Surgical History:   Matilda Decker  has a past surgical history that includes Colposcopy and Cervical biopsy w/ loop electrode excision.  Medications:   Matilda currently has no medications in their medication list.  Allergies:   Review of patient's allergies indicates:  No Known Allergies     Objective    NT = not tested due to pain, inability to obtain test position, or relevancy     RANGE OF MOTION:  Hip AROM/PROM Right  10/2/2024 Left  10/2/2024   Hip IR (45) 40 42   Hip ER (45) 24 15     Knee Right  10/2/2024 Left  10/2/2024   Hyper - Zero - Flexion  0-130 0-130     Ankle/Foot  Right  10/2/2024 Left  10/2/2024   Dorsiflexion (20) 20 12   Plantarflexion (50) 52 35   Inversion 38 46   Eversion 36 22   Great Toe Ext(70) 45 39     STRENGTH:  L/E MMT Left  10/2/2024 Right  10/2/2024 Goal   Hip Extension  4+/5 4+/5 5/5 B   Hip Abduction  4+/5 4+/5 5/5 B   Hip IR 4+/5 4+/5 5/5 B     SPECIAL TESTS:  TEST Right  10/2/2024 Left   10/2/2024   Hip   NOLVIA []+ [x]- []NT []+ [x]- []NT   FADIR []+ [x]- []NT []+ [x]- []NT   Ankle   Anterior Drawer (ATFL) []+ [x]- []NT []+ [x]- []NT   Talar Tilt []+ [x]- []NT []+ [x]- []NT   Muscle Length   Hamstring Length [x]+ []- []NT [x]+ []- []NT     Sensation:  Sensation is intact to light touch  Palpation: Increased tone and tenderness noted with palpation to: R piriformis, R SI joint, R hamstring proximal attachment, Fibulars brevis distal attachment, QL  Posture:  Pt presents with postural abnormalities which include  []None  []Forward head  []Rounded shoulders  []Thoracic Kyphosis  []Lumbar Lordosis  []Slouched Sitting or Standing  [x]: R  anterior rotation of innominate, R lateral hallux valugs, R 2nd and 3rd - hammer toe  Gait Analysis:   Assistive device:  [x]None []Crutches []Straight Cane []Rolling Walker  [] Other:   Gait abnormalities:   [x]No significant gait deviations noted  []Increased NICOLETTE  []Anterior Trunk Lean  []Increased Knee Flexion in Stance  []Knee Hyperextension in stance  []Foot Drop/Drag  []Decreased toe off  []Decreased heel strike  []Trendelenburg  []Other:    Limitation/Restriction for FOTO Foot Survey  Therapist reviewed FOTO scores for Matilda on 10/2/2024 .   FOTO documents entered into [employer] - see Media section.  Score: 77 /100     Treatment   Total Treatment time (time-based codes) separate from Evaluation: 20 minutes     Matilda received following skilled interventions listed below:    PT Intervention Parameters Time   Therapeutic Exercise to develop strength, endurance, ROM, flexibility, posture, and core stabilization Home exercise program  Nature of condition  Anatomy education with visuals  10 minutes (1)   Neuromuscular Re-education activities to improve: Balance, Coordination, Kinesthetic, Sense, Proprioception, and Posture  Postural correction / foot posture and positioning education  Home exercise program  10 minutes (1)      Patient Education and Home Exercises   Education provided:   Patient was educated on the role of Physical Therapy, Plan of Care, treatment plan, discharge goals and clinic call/cancel/no show policy.  Patient educated on biomechanical justification for physical therapy and importance of compliance with Home Exercise Program in order to improve overall impairments and Quality of Life.    Clinic Policies:  Patient was provided with physical copy of [facility] Policies necessary for therapy treatment sessions including: Attendance, Clothing, Cell Phone and Visitors.   Patient was additionally provided with contact information for the clinic including phone, fax and physical address for reference.    Patient verbalizes that they have received this information and is agreeable to follow these policies.     Written Home Exercises Provided: yes.   Exercises were reviewed and Matilda  was able to demonstrate them prior to the end of the session.    Matilda  demonstrated good  understanding of the education provided.  See EMR under Patient Instructions for exercises provided during therapy sessions.    Assessment   Matilda is a 54 y.o. female referred to outpatient Physical Therapy. Patient presents with impaired foot posture and toe positioning, tightness in R posterior chain impacting standing tolerance, activities of daily living performance and perception of pain with mobility tasks. Patient does have a limited availability for attendance due to work scheduled. Therefore treatments will be 1x per week with emphasis on manual interventions that she is unable to perform at home. Will review weekly education and home exercise program to impact knowledge of self-stretching and self-care.     Patient is indicated for skilled physical therapy services to impact knowledge of condition, safety awareness and improve upon aforementioned deficits. Patient is pleasant and agreeable to physical therapy plan of care.     Patient prognosis is Good.   Patient will benefit from skilled outpatient Physical Therapy to address the deficits stated above and in the chart below, provide patient /family education, and to maximize patientt's level of independence.     Plan of care discussed with: []Patient []Family []Patient/Family  Patient's spiritual, cultural and educational needs considered and patient is agreeable to the plan of care and goals as stated below:     Anticipated barriers for therapy:  []None  []Cognitive  []Emotional  []Hearing  []Learning  [x]Other: availability  work schedule, attendance     Medical Necessity is demonstrated by the following  History  Co-morbidities and personal factors that may impact the plan of care []  LOW: no personal factors / co-morbidities  [x] MODERATE: 1-2 personal factors / co-morbidities  [] HIGH: 3+ personal factors / co-morbidities    Moderate / High Support Documentation:  Chronic R foot / plantar pain       Examination  Body Structures and Functions, activity limitations and participation restrictions that may impact the plan of care [] LOW: addressing 1-2 elements  [x] MODERATE: 3+ elements  [] HIGH: 4+ elements (please support below)    Moderate / High Support Documentation:  Body Region / System  R foot, ankle, posterior chain R side, trunk, SI and pelvic , strength, mobility and neuromuscular control/function  Participation Restrictions  Stand, kneel, squat, bend ,lift  Activity Limitations  Impaired prolonged driving and walking, recreational activity performance      Clinical Presentation [x] LOW: stable  [] MODERATE: Evolving  [] HIGH: Unstable     Decision Making/ Complexity Score: low       Goals:  Short Term Goals Status Est. Met   Patient to be independent with foundational home exercise program performance to impact knowledge of condition  [] Met  [] Not Met  [] Progressing 10/2/2024    Patient to improve L foot PF/DF by 5 degrees both directions to improve mobility and function [] Met  [] Not Met  [] Progressing 10/2/2024    Patient to improve bilateral great toe extension to 50 degrees or greater to impact mobility and gait pattern  [] Met  [] Not Met  [] Progressing 10/2/2024    Patient to improve foot FOTO to 82 /100 to display improved activity participation [] Met  [] Not Met  [] Progressing 10/2/2024      Long Term Goal Status Est. Met   Patient will display independent and correct performance of advanced home exercise program without cueing to impact knowledge of condition [] Met  [] Not Met  [] Progressing 10/2/2024    Patient to improve L foot PF by 10 degrees both directions to improve mobility and function [] Met  [] Not Met  [] Progressing 10/2/2024    Patient to report  improved standing and walking tolerance without increase foot and leg pain  [] Met  [] Not Met  [] Progressing 10/2/2024    Patient to improve foot FOTO to 87 /100 to display improved activity participation [] Met  [] Not Met  [] Progressing 10/2/2024    Patient will report confidence in managing condition upon discharge from Physical Therapy. [] Met  [] Not Met  [] Progressing 10/2/2024      Plan   Plan of care Certification: 10/2/2024 to 12/11/24.    Outpatient Physical Therapy 1-2 times weekly for 10 weeks to include the following interventions: Gait Training, Manual Therapy, Moist Heat/ Ice, Neuromuscular Re-ed, Patient Education, Self Care, Therapeutic Activities, and Therapeutic Exercise , Electrical Stimulation (IFC, Russian), IASTM, STM, Cupping, Blood Flow Restriction as appropriate.    Brittany Curran PT, DPT, SCS, CSCS  Board Certified Sports Clinical Specialist   Certified Dry Needling Provider  10/2/2024

## 2024-10-02 NOTE — PROGRESS NOTES
She Disclaimer: This note has been generated using voice-recognition software. There may be typographical errors that have been missed during proof-reading        Patient ID: Natali Claire is a 54 y.o. female.      Chief Complaint: Shoulder Pain and Hip Pain (Pain In left shoulder and Right hip)      54-year-old female returns for re-evaluation of left shoulder and right hip pain.  She has a long history rheumatoid arthritis and remains under the care rheumatology and has a pending appointment in 1 month.  She has refused nerve block injections and reportedly took Norco 1-1/2 days ago.  She participated in physical therapy without significant improvement and returns today for medication refill.  We will obtain a definitive urine drug screen today due to negative urine drug screen point of care for prescribed opiates                      Pain Assessment  Pain Assessment: 0-10  Pain Score:   4  Pain Location: Shoulder  Pain Orientation: Left  Pain Radiating Towards: Right Hip  Pain Descriptors: Stabbing  Pain Frequency: Intermittent  Aggravating Factors: Bending, Walking  Pain Intervention(s): Heat applied, Medication (See eMAR)      A's of Opioid Risk Assessment  Activity:Patient cannot perform ADL.   Analgesia:Patients pain is  controlled by current medication.   Adverse Effects: Patient denies constipation or sedation.  Aberrant Behavior:  reviewed with no aberrant drug seeking/taking behavior.      Patient denies any suicidal or homicidal ideations    Physical Therapy/Home Exercise: July 2023 without benefit          Review of Systems   Constitutional: Negative.    HENT: Negative.     Eyes: Negative.    Respiratory: Negative.     Cardiovascular: Negative.    Gastrointestinal: Negative.    Endocrine: Negative.    Genitourinary: Negative.    Musculoskeletal:  Positive for arthralgias (Left shoulder and right hip).   Integumentary:  Negative.   Neurological:  Positive for weakness (Left upper extremity) and numbness  (Left upper extremity).   Hematological: Negative.    Psychiatric/Behavioral:  Positive for sleep disturbance.              Past Medical History:   Diagnosis Date    Anxiety     Arthritis     Chronic low back pain     Chronic pain syndrome     Degenerative joint disease involving multiple joints     Diabetes mellitus, type 2     GERD (gastroesophageal reflux disease)     Hyperlipidemia     Hypertension     Lumbar radiculopathy     Lumbosacral spondylosis     Multiple joint pain     Onychomycosis     Osteoarthritis     Other long term (current) drug therapy     Rheumatoid arthritis      Past Surgical History:   Procedure Laterality Date    BREAST SURGERY      CHOLECYSTECTOMY      TUBAL LIGATION       Social History     Socioeconomic History    Marital status: Single    Number of children: 2   Occupational History    Occupation: disable   Tobacco Use    Smoking status: Never     Passive exposure: Never    Smokeless tobacco: Never   Substance and Sexual Activity    Alcohol use: Not Currently    Drug use: Never    Sexual activity: Yes     Partners: Male     Family History   Problem Relation Name Age of Onset    Diabetes Mother      Heart disease Father      Diabetes Sister      Breast cancer Paternal Cousin       Review of patient's allergies indicates:  No Known Allergies  has a current medication list which includes the following prescription(s): atorvastatin, cetirizine, ciclopirox, cyclobenzaprine, diclofenac sodium, enbrel sureclick, ergocalciferol, esomeprazole, ferrous sulfate, folic acid, glimepiride, hydrochlorothiazide, hydrocodone-acetaminophen, loratadine, metformin, methotrexate, metoprolol tartrate, montelukast, mounjaro, nabumetone, potassium chloride sa, mounjaro, tizanidine, polytussin dm(dexchlorphenrmn), [START ON 10/9/2024] hydrocodone-acetaminophen, and [START ON 11/8/2024] hydrocodone-acetaminophen.      Objective:  Vitals:    10/02/24 1355   BP: (!) 135/90   Pulse: 75        Physical Exam  Vitals  and nursing note reviewed.   Constitutional:       General: She is not in acute distress.     Appearance: Normal appearance. She is obese. She is not ill-appearing, toxic-appearing or diaphoretic.   HENT:      Head: Normocephalic and atraumatic.      Nose: Nose normal.      Mouth/Throat:      Mouth: Mucous membranes are moist.   Eyes:      Extraocular Movements: Extraocular movements intact.      Pupils: Pupils are equal, round, and reactive to light.   Cardiovascular:      Rate and Rhythm: Normal rate and regular rhythm.      Heart sounds: Normal heart sounds.   Pulmonary:      Effort: Pulmonary effort is normal. No respiratory distress.      Breath sounds: Normal breath sounds. No stridor. No wheezing or rhonchi.   Abdominal:      General: Bowel sounds are normal.      Palpations: Abdomen is soft.   Musculoskeletal:         General: No swelling or deformity.      Left shoulder: Tenderness and bony tenderness present.      Cervical back: Spasms and tenderness present. Pain with movement present. Decreased range of motion.      Thoracic back: Normal.      Lumbar back: No spasms, tenderness or bony tenderness. Normal range of motion. Negative right straight leg raise test and negative left straight leg raise test. No scoliosis.      Right lower leg: No edema.      Left lower leg: No edema.   Skin:     General: Skin is warm.   Neurological:      General: No focal deficit present.      Mental Status: She is alert and oriented to person, place, and time. Mental status is at baseline.      Cranial Nerves: No cranial nerve deficit.      Sensory: Sensation is intact. No sensory deficit.      Motor: No weakness.      Coordination: Coordination normal.      Gait: Gait normal.      Deep Tendon Reflexes: Reflexes are normal and symmetric.   Psychiatric:         Mood and Affect: Mood normal.         Behavior: Behavior normal.         Assessment:      1. Encounter for long-term (current) use of medications    2. Rheumatoid  arthritis involving multiple sites with positive rheumatoid factor    3. Chronic left shoulder pain    4. Chronic pain syndrome    5. Cervical radiculopathy            Plan:  1. reviewed  2.Addiction, Dependency, Tolerance, Opioid abuse-misuse, Death, Diversion Discussed. Overdose reversal drug Naloxone discussed. Patient is prescribed opiates for chronic nonmalignant pain pathology.  Patient is receiving opiates which require greater than a 72 hour supply of therapy.  Patient was educated on potential dependency associated with long-term opioid use as well as decreasing efficacy with prolonged use.  Patient was advised of risks, benefits and side effects and how to utilize each medication.  Patient was also informed that any deviation from therapy protocol will  lead to discontinuation of opiates.  It is reasonable to prescribe opioid analgesics for patient based on positive response to opioid medications, lack of side effects and  limited aberrant behavior.    3.Refill/Continue medications for pain control and function       Requested Prescriptions     Signed Prescriptions Disp Refills    HYDROcodone-acetaminophen (NORCO)  mg per tablet 60 tablet 0     Sig: Take 1 tablet by mouth every 12 (twelve) hours as needed for Pain.    HYDROcodone-acetaminophen (NORCO)  mg per tablet 60 tablet 0     Sig: Take 1 tablet by mouth every 12 (twelve) hours as needed for Pain.     4.Patient defers nerve block injections, physical therapy or surgical consultation  5.Follow with MICHAEL Slater in 2 months for re-evaluation and medication refill         report:  Reviewed and consistent with medication use as prescribed.      The total time spent for evaluation and management on 10/02/2024 including reviewing separately obtained history, performing a medically appropriate exam and evaluation, documenting clinical information in the health record, independently interpreting results and communicating them to the  patient/family/caregiver, and ordering medications/tests/procedures was between 15-29 minutes.    The above plan and management options were discussed at length with patient. Patient is in agreement with the above and verbalized understanding. It will be communicated with the referring physician via electronic record, fax, or mail.

## 2024-10-07 DIAGNOSIS — E11.9 TYPE 2 DIABETES MELLITUS WITHOUT COMPLICATION, WITHOUT LONG-TERM CURRENT USE OF INSULIN: ICD-10-CM

## 2024-10-07 RX ORDER — TIRZEPATIDE 5 MG/.5ML
5 INJECTION, SOLUTION SUBCUTANEOUS
Qty: 4 ML | Refills: 2 | Status: SHIPPED | OUTPATIENT
Start: 2024-10-07 | End: 2025-10-07

## 2024-10-09 ENCOUNTER — CLINICAL SUPPORT (OUTPATIENT)
Dept: REHABILITATION | Facility: HOSPITAL | Age: 54
End: 2024-10-09
Payer: COMMERCIAL

## 2024-10-09 DIAGNOSIS — Z78.9 IMPAIRED MOBILITY AND ACTIVITIES OF DAILY LIVING: Primary | Chronic | ICD-10-CM

## 2024-10-09 DIAGNOSIS — Z74.09 IMPAIRED MOBILITY AND ACTIVITIES OF DAILY LIVING: Primary | Chronic | ICD-10-CM

## 2024-10-09 PROCEDURE — 97110 THERAPEUTIC EXERCISES: CPT | Mod: PN | Performed by: GENERAL ACUTE CARE HOSPITAL

## 2024-10-09 PROCEDURE — 97112 NEUROMUSCULAR REEDUCATION: CPT | Mod: PN | Performed by: GENERAL ACUTE CARE HOSPITAL

## 2024-10-09 PROCEDURE — 97140 MANUAL THERAPY 1/> REGIONS: CPT | Mod: PN | Performed by: GENERAL ACUTE CARE HOSPITAL

## 2024-10-09 NOTE — PROGRESS NOTES
[facility]   Physical Therapy Treatment Note    Name: Matilda Decker  Clinic Number: [id number]    Therapy Diagnosis:   Encounter Diagnosis   Name Primary?    Impaired mobility and activities of daily living Yes     Physician: Ramo Bashir MD  Physician Orders: PT Eval and Treat   Medical Diagnosis from Referral: Plantar Fasciitis  Evaluation Date: 10/2/2024  Authorization Period Expiration: 12/31/24  Plan of Care Expiration: 12/11/24 (10 weeks)  Progress Note Due: 11/2/24  Visit # / Visits authorized: treatments: 1 (1/1 Evaluation)  FOTO: 1/3 - foot  77/100    Visit Date: 10/9/2024  Time In: 0805  Time Out: 0905  Total Appointment Time: 60 minutes  PTA Visit #: 0/5     Treatment No Show #:  Subjective   Pt reports: Feeling good this morning. Patient reports that she has been compliant with home exercise program and able to increase walking distance lately.     She was compliant with home exercise program.  Response to previous treatment: 1st treatment session  Functional change: 1st treatment session    Pain: 2/10  Location: bilateral feet      Objective      Objective Measures updated at progress report unless specified.     Treatment   Matilda received following skilled interventions listed below:    PT Intervention Parameters Time   Therapeutic Exercise to develop strength, endurance, ROM, flexibility, posture, and core stabilization Supine hamstring stretch with strap 3x30s bilateral  Figure 4 stretch (using towel for assist) 2x30s bilateral  Home exercise program review & plan of care  12 minutes (1)   Neuromuscular Re-education activities to improve: Balance, Coordination, Kinesthetic, Sense, Proprioception, and Posture  Diaphragmatic deep breathing with overhead extension in supine lying  Seated heel raise with 20# kettlebell x20 bilateral  Bilateral eversion Red Theraband  Toe yoga big toe x20 reps david  Toe yoga little toes x20 reps bilateral   Arch lifts x20 bilateral  23 minutes (2)      Manual Therapy techniques Joint mobilizations, Manual traction, Myofacial release, Soft tissue Mobilization, and Friction Massage  Bilateral gastroc massage gun STM 8 minutes (1)      *A portion of this treatment session is provided with the assistance of a skilled rehabilitation technician under the supervision of a licensed physical therapist.   *Billing accurately reflects 1:1 treatment time per patient's insurance guidelines.      Patient Education and Home Exercises     Home Exercises Provided and Patient Education Provided   Patient was educated on the role of PT, POC, treatment plan, discharge goals, HEP.  Patient educated on biomechanical justification for therapeutic exercise and importance of compliance with HEP in order to improve overall impairments and QOL   Patient was educated on all the above exercise prior/during/after for proper posture, positioning, and execution for safe performance with home exercise program.       Written Home Exercises Provided:   yes.   Exercises were reviewed and Matilda was able to demonstrate them prior to the end of the session.    Matilda demonstrated good  understanding of the education provided.   See EMR under Patient Instructions for exercises provided during therapy sessions    Assessment     Patient is educated on safe and appropriate use of rehabilitation equipment and exercises today in the clinic. Patient is able to provide appropriate and safe return demonstration of use during their first treatment session. Verbal and tactile cueing is provided when appropriate to correct technique. Patient is encouraged to maintain compliance with home exercise program.      Matilda Is progressing well towards her goals.   Pt prognosis is Good.   Pt will continue to benefit from skilled outpatient physical therapy to address the deficits listed in the problem list box on initial evaluation, provide pt/family education and to maximize pt's level of independence in  the home and community environment.   Pt's spiritual, cultural and educational needs considered and pt agreeable to plan of care and goals.  Anticipated barriers to physical therapy: none    Goals:  Short Term Goals Status Est. Met   Patient to be independent with foundational home exercise program performance to impact knowledge of condition  [] Met  [] Not Met  [] Progressing 10/2/2024     Patient to improve L foot PF/DF by 5 degrees both directions to improve mobility and function [] Met  [] Not Met  [] Progressing 10/2/2024     Patient to improve bilateral great toe extension to 50 degrees or greater to impact mobility and gait pattern  [] Met  [] Not Met  [] Progressing 10/2/2024     Patient to improve foot FOTO to 82 /100 to display improved activity participation [] Met  [] Not Met  [] Progressing 10/2/2024        Long Term Goal Status Est. Met   Patient will display independent and correct performance of advanced home exercise program without cueing to impact knowledge of condition [] Met  [] Not Met  [] Progressing 10/2/2024     Patient to improve L foot PF by 10 degrees both directions to improve mobility and function [] Met  [] Not Met  [] Progressing 10/2/2024     Patient to report improved standing and walking tolerance without increase foot and leg pain  [] Met  [] Not Met  [] Progressing 10/2/2024     Patient to improve foot FOTO to 87 /100 to display improved activity participation [] Met  [] Not Met  [] Progressing 10/2/2024     Patient will report confidence in managing condition upon discharge from Physical Therapy. [] Met  [] Not Met  [] Progressing 10/2/2024        Plan   Plan of care Certification: 10/2/2024 to 12/11/24.     Outpatient Physical Therapy 1-2 times weekly for 10 weeks to include the following interventions: Gait Training, Manual Therapy, Moist Heat/ Ice, Neuromuscular Re-ed, Patient Education, Self Care, Therapeutic Activities, and Therapeutic Exercise , Electrical Stimulation  (IFC, Jamaican), IASTM, STM, Cupping, Blood Flow Restriction as appropriate.      Brittany Curran PT, DPT, SCS, CSCS  Board Certified Sports Clinical Specialist   Certified Dry Tobinling Provider  10/9/2024

## 2024-10-10 ENCOUNTER — OFFICE VISIT (OUTPATIENT)
Dept: PRIMARY CARE CLINIC | Facility: CLINIC | Age: 54
End: 2024-10-10
Payer: MEDICARE

## 2024-10-10 VITALS
HEIGHT: 69 IN | DIASTOLIC BLOOD PRESSURE: 80 MMHG | RESPIRATION RATE: 20 BRPM | OXYGEN SATURATION: 95 % | HEART RATE: 74 BPM | BODY MASS INDEX: 40.43 KG/M2 | SYSTOLIC BLOOD PRESSURE: 132 MMHG | WEIGHT: 273 LBS | TEMPERATURE: 98 F

## 2024-10-10 DIAGNOSIS — E78.5 HYPERLIPIDEMIA, UNSPECIFIED HYPERLIPIDEMIA TYPE: ICD-10-CM

## 2024-10-10 DIAGNOSIS — I10 HYPERTENSION, UNSPECIFIED TYPE: ICD-10-CM

## 2024-10-10 DIAGNOSIS — Z12.11 SCREENING FOR MALIGNANT NEOPLASM OF COLON: ICD-10-CM

## 2024-10-10 DIAGNOSIS — Z23 ENCOUNTER FOR IMMUNIZATION: ICD-10-CM

## 2024-10-10 DIAGNOSIS — Z00.00 ENCOUNTER FOR SUBSEQUENT ANNUAL WELLNESS VISIT (AWV) IN MEDICARE PATIENT: Primary | ICD-10-CM

## 2024-10-10 DIAGNOSIS — M05.79 RHEUMATOID ARTHRITIS INVOLVING MULTIPLE SITES WITH POSITIVE RHEUMATOID FACTOR: ICD-10-CM

## 2024-10-10 DIAGNOSIS — E11.9 TYPE 2 DIABETES MELLITUS WITHOUT COMPLICATION, WITHOUT LONG-TERM CURRENT USE OF INSULIN: ICD-10-CM

## 2024-10-10 DIAGNOSIS — G89.4 CHRONIC PAIN SYNDROME: Chronic | ICD-10-CM

## 2024-10-10 DIAGNOSIS — D64.9 ANEMIA, UNSPECIFIED TYPE: ICD-10-CM

## 2024-10-10 DIAGNOSIS — E66.01 MORBID OBESITY: ICD-10-CM

## 2024-10-10 PROBLEM — M54.9 BACKACHE: Status: RESOLVED | Noted: 2022-10-17 | Resolved: 2024-10-10

## 2024-10-10 PROBLEM — M79.642 LEFT HAND PAIN: Status: RESOLVED | Noted: 2021-12-01 | Resolved: 2024-10-10

## 2024-10-10 PROBLEM — J32.9 SINUSITIS: Status: RESOLVED | Noted: 2021-12-01 | Resolved: 2024-10-10

## 2024-10-10 PROBLEM — J02.9 PHARYNGITIS: Status: RESOLVED | Noted: 2023-06-07 | Resolved: 2024-10-10

## 2024-10-10 NOTE — LETTER
AUTHORIZATION FOR RELEASE OF   CONFIDENTIAL INFORMATION    Dear Internal Medicine,    We are seeing Natali Claire, date of birth 1970, in the clinic at [facility]. Herlinda Louis MD is the patient's PCP. Natali Claire has an outstanding lab/procedure at the time we reviewed her chart. In order to help keep her health information updated, she has authorized us to request the following medical record(s):        (  )  MAMMOGRAM                                      (  )  COLONOSCOPY      (  )  PAP SMEAR                                          (  )  OUTSIDE LAB RESULTS     (  )  DEXA SCAN                                          (  )  EYE EXAM            (  X)  FOOT EXAM                                          (  )  ENTIRE RECORD     (  )  OUTSIDE IMMUNIZATIONS                 (  )  _______________         Please fax records to Herlinda Louis MD, [phone]     If you have any questions, please contact Gia at [phone].           Patient Name: Natali Claire  : 1970  Patient Phone #: [phone]      October 10, 2024    Natali Claire  [address] [address]

## 2024-10-10 NOTE — PROGRESS NOTES
"  Natali Claire presented for a  Medicare AWV and comprehensive Health Risk Assessment today. The following components were reviewed and updated:    Medical history  Family History  Social history  Allergies and Current Medications  Health Risk Assessment  Health Maintenance  Care Team         ** See Completed Assessments for Annual Wellness Visit within the encounter summary.**         The following assessments were completed:  Living Situation  CAGE  Depression Screening  Timed Get Up and Go  Whisper Test  Cognitive Function Screening  Nutrition Screening  ADL Screening  PAQ Screening      Opioid documentation:      Patient does have a current opioid prescription.      Patient accepted further discussion regarding opioid medication use.      Patient is currently taking hydrocodone narcotic for generalized pain.        Pain level today is 3/10.       In addition to narcotic pain medications, patient is also using NSAID for pain control.       Patient is followed by a specialist currently for their pain and will not be referred today.       Patient's opioid risk potential based on ORT-OUD tool:       Riky each box that applies   No   Yes     Family history of substance abuse   Alcohol [x] []   Illegal drugs [x] []   Rx drugs [x] []     Personal history of substance abuse   Alcohol [x] []   Illegal drugs [x] []   Rx drugs [x] []     Age between 16-45 years   [x]   []     Patient with ADD, OCD, Bipolar disorder, schizoprenia   [x]   []     Patient with depression   [x]   []                         Scoring total                                                                 Non-opioid treatment options have been discussed today and added to the patient's after visit summary.        Vitals:    10/10/24 1457   BP: 132/80   Pulse: 74   Resp: 20   Temp: 98.1 °F (36.7 °C)   TempSrc: Oral   SpO2: 95%   Weight: 123.8 kg (273 lb)   Height: 5' 9" (1.753 m)     Body mass index is 40.32 kg/m².  Physical Exam  Vitals and nursing " note reviewed.   Constitutional:       General: She is not in acute distress.     Appearance: Normal appearance. She is not ill-appearing, toxic-appearing or diaphoretic.   HENT:      Head: Normocephalic.      Right Ear: Tympanic membrane, ear canal and external ear normal.      Left Ear: Tympanic membrane, ear canal and external ear normal.      Nose: Nose normal.      Mouth/Throat:      Mouth: Mucous membranes are moist.   Eyes:      Extraocular Movements: Extraocular movements intact.      Conjunctiva/sclera: Conjunctivae normal.      Pupils: Pupils are equal, round, and reactive to light.   Cardiovascular:      Rate and Rhythm: Normal rate and regular rhythm.      Heart sounds: Normal heart sounds.   Pulmonary:      Effort: Pulmonary effort is normal. No respiratory distress.      Breath sounds: Normal breath sounds. No stridor. No wheezing, rhonchi or rales.   Abdominal:      General: Bowel sounds are normal. There is no distension.      Palpations: Abdomen is soft.      Tenderness: There is no abdominal tenderness. There is no guarding.   Musculoskeletal:         General: Normal range of motion.      Cervical back: Normal range of motion and neck supple.   Skin:     General: Skin is warm and dry.   Neurological:      General: No focal deficit present.      Mental Status: She is alert and oriented to person, place, and time.      Gait: Gait normal.   Psychiatric:         Mood and Affect: Mood normal.         Behavior: Behavior normal.               Diagnoses and health risks identified today and associated recommendations/orders:    1. Encounter for subsequent annual wellness visit (AWV) in Medicare patient  Screenings performed as noted above. Personal preventative testing needs reviewed.    2. Screening for malignant neoplasm of colon  - Colonoscopy; Future  - Pneumococcal Conjugate Vaccine (20 Valent) (IM)(Preferred)    3. Encounter for immunization    - (VFC) influenza (Flulaval, Fluzone, Fluarix) 45  mcg/0.5 mL IM vaccine (> or = 6 mo) 0.5 mL    4. Type 2 diabetes mellitus without complication, without long-term current use of insulin  Stable. Followed by PCP    5. Hyperlipidemia, unspecified hyperlipidemia type  Stable. Followed by PCP    6. Hypertension, unspecified type  Stable. Followed by PCP    7. Rheumatoid arthritis involving multiple sites with positive rheumatoid factor  Stable. Followed by PCP  Sees chronic pain management    8. Chronic pain syndrome  Sees chronic pain management    9. BMI 40.0-44.9, adult  Recommend healthy diet and exercise    10. Anemia, unspecified type  Stable. Followed by PCP    11. Morbid obesity  Recommend healthy diet and exercise      Provided Natali with a 5-10 year written screening schedule and personal prevention plan. Recommendations were developed using the USPSTF age appropriate recommendations. Education, counseling, and referrals were provided as needed. After Visit Summary printed and given to patient which includes a list of additional screenings\tests needed.    Advanced directive discussed with patient; paperwork given.     Follow up in about 1 year (around 10/10/2025) for AWV Follow-up.    Griselda Rogers DNP, FNP-C

## 2024-10-16 ENCOUNTER — CLINICAL SUPPORT (OUTPATIENT)
Dept: REHABILITATION | Facility: HOSPITAL | Age: 54
End: 2024-10-16
Payer: COMMERCIAL

## 2024-10-16 DIAGNOSIS — Z78.9 IMPAIRED MOBILITY AND ACTIVITIES OF DAILY LIVING: Primary | Chronic | ICD-10-CM

## 2024-10-16 DIAGNOSIS — Z74.09 IMPAIRED MOBILITY AND ACTIVITIES OF DAILY LIVING: Primary | Chronic | ICD-10-CM

## 2024-10-16 PROCEDURE — 97112 NEUROMUSCULAR REEDUCATION: CPT | Mod: PN | Performed by: GENERAL ACUTE CARE HOSPITAL

## 2024-10-16 PROCEDURE — 97014 ELECTRIC STIMULATION THERAPY: CPT | Mod: PN | Performed by: GENERAL ACUTE CARE HOSPITAL

## 2024-10-16 PROCEDURE — 97140 MANUAL THERAPY 1/> REGIONS: CPT | Mod: PN | Performed by: GENERAL ACUTE CARE HOSPITAL

## 2024-10-16 NOTE — PROGRESS NOTES
[facility]   Physical Therapy Treatment Note    Name: Matilda Decker  Clinic Number: [id number]    Therapy Diagnosis:   Encounter Diagnosis   Name Primary?    Impaired mobility and activities of daily living Yes     Physician: Ramo Bashir MD  Physician Orders: PT Eval and Treat   Medical Diagnosis from Referral: Plantar Fasciitis  Evaluation Date: 10/2/2024  Authorization Period Expiration: 12/31/24  Plan of Care Expiration: 12/11/24 (10 weeks)  Progress Note Due: 11/2/24  Visit # / Visits authorized: treatments: 2 (1/1 Evaluation)  FOTO: 1/3 - foot  77/100    Visit Date: 10/16/2024  Time In: 0805  Time Out: 0905  Total Appointment Time: 60 minutes  PTA Visit #: 0/5     Treatment No Show #: 0  Subjective   Pt reports: That she had been doing well until she had a long work day and an after hours event from 9p-12am at work. She reports increased arch/foot pain this morning.     She was compliant with home exercise program.  Response to previous treatment: on-going  Functional change: on-going  Pain: 2/10  Location: bilateral feet      Objective      Objective Measures updated at progress report unless specified.     Treatment   Matilda received following skilled interventions listed below:    PT Intervention Parameters Time   Neuromuscular Re-education activities to improve: Balance, Coordination, Kinesthetic, Sense, Proprioception, and Posture  Towel crunches x30 - bilateral  Toe yoga x30 great toe  Toe yoga - little toes  Foot arches on the rockerboard x15 R & L  Heel raises on rockerboard 10# kettlebell x20 R & L  Active HS and gastroc stretching with ankle pumps for nerve glides x10 reps R & L side  25 minutes (2)      Manual Therapy techniques Joint mobilizations, Manual traction, Myofacial release, Soft tissue Mobilization, and Friction Massage     Dry Needling to impact muscular pain, tightness, TrPs, and tissue pliability Location: Bilateral gastroc   Needle Length:40 mmx  2 needles medial & Lateral  Needle Width: 0.3mm  Patient Position: lying prone  Patient gave Written and Verbal consent to undergo dry needling. Written consent can be found in the media section in pts chart. All needles were removed and changes in signs and symptoms were assessed. No adverse reactions noted at the conclusion of the treatment.   + 6 minutes of electrical stimulation R gastroc only     Bilateral gastroc STM post dry needling 15 minutes (1)  +  Untimed (1)    *A portion of this treatment session is provided with the assistance of a skilled rehabilitation technician under the supervision of a licensed physical therapist.   *Billing accurately reflects 1:1 treatment time per patient's insurance guidelines.      Patient Education and Home Exercises     Home Exercises Provided and Patient Education Provided   Patient was educated on the role of PT, POC, treatment plan, discharge goals, HEP.  Patient educated on biomechanical justification for therapeutic exercise and importance of compliance with HEP in order to improve overall impairments and QOL   Patient was educated on all the above exercise prior/during/after for proper posture, positioning, and execution for safe performance with home exercise program.       Written Home Exercises Provided:   yes.   Exercises were reviewed and Matilda was able to demonstrate them prior to the end of the session.    Matilda demonstrated good  understanding of the education provided.   See EMR under Patient Instructions for exercises provided during therapy sessions    Assessment   Patient is agreeable to dry needling today. She provides verbal and written consent. Patient has moderate muscle twitching noted in the R gastroc this morning.      Matilda Is progressing well towards her goals.   Pt prognosis is Good.   Pt will continue to benefit from skilled outpatient physical therapy to address the deficits listed in the problem list box on initial evaluation, provide pt/family  education and to maximize pt's level of independence in the home and community environment.   Pt's spiritual, cultural and educational needs considered and pt agreeable to plan of care and goals.  Anticipated barriers to physical therapy: none    Goals:  Short Term Goals Status Est. Met   Patient to be independent with foundational home exercise program performance to impact knowledge of condition  [] Met  [] Not Met  [] Progressing 10/2/2024     Patient to improve L foot PF/DF by 5 degrees both directions to improve mobility and function [] Met  [] Not Met  [] Progressing 10/2/2024     Patient to improve bilateral great toe extension to 50 degrees or greater to impact mobility and gait pattern  [] Met  [] Not Met  [] Progressing 10/2/2024     Patient to improve foot FOTO to 82 /100 to display improved activity participation [] Met  [] Not Met  [] Progressing 10/2/2024        Long Term Goal Status Est. Met   Patient will display independent and correct performance of advanced home exercise program without cueing to impact knowledge of condition [] Met  [] Not Met  [] Progressing 10/2/2024     Patient to improve L foot PF by 10 degrees both directions to improve mobility and function [] Met  [] Not Met  [] Progressing 10/2/2024     Patient to report improved standing and walking tolerance without increase foot and leg pain  [] Met  [] Not Met  [] Progressing 10/2/2024     Patient to improve foot FOTO to 87 /100 to display improved activity participation [] Met  [] Not Met  [] Progressing 10/2/2024     Patient will report confidence in managing condition upon discharge from Physical Therapy. [] Met  [] Not Met  [] Progressing 10/2/2024        Plan   Plan of care Certification: 10/2/2024 to 12/11/24.     Outpatient Physical Therapy 1-2 times weekly for 10 weeks to include the following interventions: Gait Training, Manual Therapy, Moist Heat/ Ice, Neuromuscular Re-ed, Patient Education, Self Care, Therapeutic  Activities, and Therapeutic Exercise , Electrical Stimulation (IFC, Russian), IASTM, STM, Cupping, Blood Flow Restriction as appropriate.      Brittany Curran PT, DPT, SCS, CSCS  Board Certified Sports Clinical Specialist   Certified Dry Needling Provider  10/16/2024

## 2024-10-22 ENCOUNTER — ANESTHESIA (OUTPATIENT)
Dept: ENDOSCOPY | Facility: HOSPITAL | Age: 54
End: 2024-10-22
Payer: COMMERCIAL

## 2024-10-22 ENCOUNTER — HOSPITAL ENCOUNTER (OUTPATIENT)
Facility: HOSPITAL | Age: 54
Discharge: HOME | End: 2024-10-22
Attending: FAMILY MEDICINE | Admitting: FAMILY MEDICINE
Payer: COMMERCIAL

## 2024-10-22 ENCOUNTER — ANESTHESIA EVENT (OUTPATIENT)
Dept: ENDOSCOPY | Facility: HOSPITAL | Age: 54
End: 2024-10-22
Payer: COMMERCIAL

## 2024-10-22 DIAGNOSIS — K63.5 POLYP OF COLON, UNSPECIFIED PART OF COLON, UNSPECIFIED TYPE: ICD-10-CM

## 2024-10-22 DIAGNOSIS — Z12.11 COLON CANCER SCREENING: Primary | ICD-10-CM

## 2024-10-22 PROCEDURE — 37000009 HC ANESTHESIA EA ADD 15 MINS: Performed by: FAMILY MEDICINE

## 2024-10-22 PROCEDURE — 25000003 PHARM REV CODE 250: Performed by: FAMILY MEDICINE

## 2024-10-22 PROCEDURE — 45380 COLONOSCOPY AND BIOPSY: CPT | Mod: 33,,, | Performed by: FAMILY MEDICINE

## 2024-10-22 PROCEDURE — 37000008 HC ANESTHESIA 1ST 15 MINUTES: Performed by: FAMILY MEDICINE

## 2024-10-22 PROCEDURE — 63600175 PHARM REV CODE 636 W HCPCS: Performed by: NURSE ANESTHETIST, CERTIFIED REGISTERED

## 2024-10-22 PROCEDURE — 88305 TISSUE EXAM BY PATHOLOGIST: CPT | Performed by: STUDENT IN AN ORGANIZED HEALTH CARE EDUCATION/TRAINING PROGRAM

## 2024-10-22 PROCEDURE — 88305 TISSUE EXAM BY PATHOLOGIST: CPT | Mod: 26,,, | Performed by: STUDENT IN AN ORGANIZED HEALTH CARE EDUCATION/TRAINING PROGRAM

## 2024-10-22 PROCEDURE — 45380 COLONOSCOPY AND BIOPSY: CPT | Mod: 33 | Performed by: FAMILY MEDICINE

## 2024-10-22 PROCEDURE — 27201012 HC FORCEPS, HOT/COLD, DISP: Performed by: FAMILY MEDICINE

## 2024-10-22 RX ORDER — LIDOCAINE HYDROCHLORIDE 10 MG/ML
INJECTION, SOLUTION EPIDURAL; INFILTRATION; INTRACAUDAL; PERINEURAL
Status: DISCONTINUED | OUTPATIENT
Start: 2024-10-22 | End: 2024-10-22

## 2024-10-22 RX ORDER — PROPOFOL 10 MG/ML
VIAL (ML) INTRAVENOUS
Status: DISCONTINUED | OUTPATIENT
Start: 2024-10-22 | End: 2024-10-22

## 2024-10-22 RX ORDER — DEXTROMETHORPHAN/PSEUDOEPHED 2.5-7.5/.8
DROPS ORAL
Status: DISCONTINUED | OUTPATIENT
Start: 2024-10-22 | End: 2024-10-22 | Stop reason: HOSPADM

## 2024-10-22 RX ADMIN — PROPOFOL 50 MG: 10 INJECTION, EMULSION INTRAVENOUS at 10:10

## 2024-10-22 RX ADMIN — PROPOFOL 30 MG: 10 INJECTION, EMULSION INTRAVENOUS at 11:10

## 2024-10-22 RX ADMIN — PROPOFOL 100 MG: 10 INJECTION, EMULSION INTRAVENOUS at 10:10

## 2024-10-22 RX ADMIN — PROPOFOL 50 MG: 10 INJECTION, EMULSION INTRAVENOUS at 11:10

## 2024-10-22 RX ADMIN — LIDOCAINE HYDROCHLORIDE 50 MG: 10 SOLUTION INTRAVENOUS at 10:10

## 2024-10-22 NOTE — TRANSFER OF CARE
"Anesthesia Transfer of Care Note    Patient: Matilda Decker    Procedure(s) Performed: Procedure(s) (LRB):  COLONOSCOPY, SCREENING, LOW RISK PATIENT (N/A)    Patient location: GI    Anesthesia Type: MAC    Transport from OR: Transported from OR on room air with adequate spontaneous ventilation    Post pain: adequate analgesia    Post assessment: no apparent anesthetic complications and tolerated procedure well    Post vital signs: stable    Level of consciousness: responds to stimulation    Nausea/Vomiting: no nausea/vomiting    Complications: none    Transfer of care protocol was followedComments: Report given to PACU RN. Hand Off Tool Used. RN given opportunity to ask questions or clarify concerns. No Concerns verbalized. RN was asked if ready to assume care of patient. RN verbally confirmed. Pt. Left in stable condition. SV. Vital Signs Return to Near Baseline. No s/s of distress noted      Last vitals: Visit Vitals  /84 (BP Location: Left arm, Patient Position: Lying)   Pulse 81   Temp 36.6 °C (97.9 °F) (Temporal)   Resp 17   Ht 5' 7" (1.702 m)   Wt 86.2 kg (190 lb)   SpO2 100%   Breastfeeding No   BMI 29.76 kg/m²     "

## 2024-10-22 NOTE — ANESTHESIA POSTPROCEDURE EVALUATION
Anesthesia Post Evaluation    Patient: Matilda Decker    Procedure(s) Performed: Procedure(s) (LRB):  COLONOSCOPY, SCREENING, LOW RISK PATIENT (N/A)    Final Anesthesia Type: MAC      Patient location during evaluation: GI PACU  Patient participation: Yes- Able to Participate  Level of consciousness: awake and alert  Post-procedure vital signs: reviewed and stable  Pain management: adequate  Airway patency: patent    PONV status at discharge: No PONV  Anesthetic complications: no      Cardiovascular status: blood pressure returned to baseline and hemodynamically stable  Respiratory status: unassisted, spontaneous ventilation and room air  Hydration status: euvolemic  Follow-up not needed.              Vitals Value Taken Time   /84 10/22/24 1110   Temp 36.6 °C (97.9 °F) 10/22/24 1110   Pulse 81 10/22/24 1110   Resp 17 10/22/24 1110   SpO2 100 % 10/22/24 1110         No case tracking events are documented in the log.      Pain/Cinthia Score: Cinthia Score: 8 (10/22/2024 11:10 AM)

## 2024-10-22 NOTE — ANESTHESIA PREPROCEDURE EVALUATION
10/22/2024  Matilda Decker is a 54 y.o., female.      Pre-op Assessment    I have reviewed the Patient Summary Reports.     I have reviewed the Nursing Notes. I have reviewed the NPO Status.   I have reviewed the Medications.     Review of Systems  Anesthesia Hx:  No problems with previous Anesthesia             Denies Family Hx of Anesthesia complications.    Denies Personal Hx of Anesthesia complications.                    Social:  Non-Smoker       Hematology/Oncology:  Hematology Normal   Oncology Normal                                   EENT/Dental:  EENT/Dental Normal           Cardiovascular:  Cardiovascular Normal                  ECG has been reviewed.                            Pulmonary:  Pulmonary Normal                       Renal/:  Renal/ Normal                 Hepatic/GI:  Hepatic/GI Normal                    Musculoskeletal:  Musculoskeletal Normal                Neurological:  Neurology Normal                                      Endocrine:        Obesity / BMI > 30  Dermatological:  Skin Normal    Psych:  Psychiatric Normal                    Physical Exam  General: Well nourished, Cooperative, Alert and Oriented    Airway:  Mallampati: II   Mouth Opening: Normal  TM Distance: Normal  Tongue: Normal  Neck ROM: Normal ROM    Dental:  Intact  Pt denies loose or removable dentition  Heart:  Rate: Normal  Rhythm: Regular Rhythm        Anesthesia Plan  Type of Anesthesia, risks & benefits discussed:    Anesthesia Type: MAC, Gen Natural Airway  Intra-op Monitoring Plan: Standard ASA Monitors  Post Op Pain Control Plan: multimodal analgesia  Induction:  IV  Informed Consent: Informed consent signed with the Patient and all parties understand the risks and agree with anesthesia plan.  All questions answered.   ASA Score: 2  Day of Surgery Review of History & Physical: H&P Update referred  to the surgeon/provider.I have interviewed and examined the patient. I have reviewed the patient's H&P dated: There are no significant changes.     Ready For Surgery From Anesthesia Perspective.     .

## 2024-10-23 VITALS
OXYGEN SATURATION: 95 % | SYSTOLIC BLOOD PRESSURE: 133 MMHG | WEIGHT: 190 LBS | TEMPERATURE: 98 F | DIASTOLIC BLOOD PRESSURE: 84 MMHG | RESPIRATION RATE: 17 BRPM | BODY MASS INDEX: 29.82 KG/M2 | HEART RATE: 77 BPM | HEIGHT: 67 IN

## 2024-10-23 LAB
FINAL PATHOLOGIC DIAGNOSIS: NORMAL
GROSS: NORMAL
Lab: NORMAL

## 2024-10-29 ENCOUNTER — HOSPITAL ENCOUNTER (OUTPATIENT)
Dept: RADIOLOGY | Facility: HOSPITAL | Age: 54
Discharge: HOME | End: 2024-10-29
Attending: FAMILY MEDICINE
Payer: MEDICARE

## 2024-10-29 VITALS — WEIGHT: 273 LBS | HEIGHT: 69 IN | BODY MASS INDEX: 40.43 KG/M2

## 2024-10-29 DIAGNOSIS — Z12.31 OTHER SCREENING MAMMOGRAM: ICD-10-CM

## 2024-10-29 PROCEDURE — 77067 SCR MAMMO BI INCL CAD: CPT | Mod: TC

## 2024-10-29 PROCEDURE — 77063 BREAST TOMOSYNTHESIS BI: CPT | Mod: 26,,, | Performed by: RADIOLOGY

## 2024-10-29 PROCEDURE — 77067 SCR MAMMO BI INCL CAD: CPT | Mod: 26,,, | Performed by: RADIOLOGY

## 2024-10-29 PROCEDURE — 77063 BREAST TOMOSYNTHESIS BI: CPT | Mod: TC

## 2024-10-30 ENCOUNTER — CLINICAL SUPPORT (OUTPATIENT)
Dept: REHABILITATION | Facility: HOSPITAL | Age: 54
End: 2024-10-30
Payer: COMMERCIAL

## 2024-10-30 DIAGNOSIS — Z74.09 IMPAIRED MOBILITY AND ACTIVITIES OF DAILY LIVING: Primary | Chronic | ICD-10-CM

## 2024-10-30 DIAGNOSIS — Z78.9 IMPAIRED MOBILITY AND ACTIVITIES OF DAILY LIVING: Primary | Chronic | ICD-10-CM

## 2024-10-30 PROCEDURE — 97140 MANUAL THERAPY 1/> REGIONS: CPT | Mod: PN | Performed by: GENERAL ACUTE CARE HOSPITAL

## 2024-10-30 PROCEDURE — 97014 ELECTRIC STIMULATION THERAPY: CPT | Mod: PN | Performed by: GENERAL ACUTE CARE HOSPITAL

## 2024-10-30 PROCEDURE — 97112 NEUROMUSCULAR REEDUCATION: CPT | Mod: PN | Performed by: GENERAL ACUTE CARE HOSPITAL

## 2024-10-31 ENCOUNTER — EXTERNAL CHRONIC CARE MANAGEMENT (OUTPATIENT)
Dept: PRIMARY CARE CLINIC | Facility: CLINIC | Age: 54
End: 2024-10-31
Payer: MEDICARE

## 2024-10-31 PROCEDURE — G0511 CCM/BHI BY RHC/FQHC 20MIN MO: HCPCS | Mod: ,,, | Performed by: FAMILY MEDICINE

## 2024-11-12 ENCOUNTER — OFFICE VISIT (OUTPATIENT)
Dept: PRIMARY CARE CLINIC | Facility: CLINIC | Age: 54
End: 2024-11-12
Payer: MEDICARE

## 2024-11-12 VITALS
OXYGEN SATURATION: 100 % | HEIGHT: 69 IN | WEIGHT: 279 LBS | DIASTOLIC BLOOD PRESSURE: 86 MMHG | TEMPERATURE: 98 F | BODY MASS INDEX: 41.32 KG/M2 | HEART RATE: 73 BPM | RESPIRATION RATE: 18 BRPM | SYSTOLIC BLOOD PRESSURE: 134 MMHG

## 2024-11-12 DIAGNOSIS — I10 HYPERTENSION, UNSPECIFIED TYPE: ICD-10-CM

## 2024-11-12 DIAGNOSIS — D84.821 DRUG-INDUCED IMMUNODEFICIENCY: ICD-10-CM

## 2024-11-12 DIAGNOSIS — E11.9 TYPE 2 DIABETES MELLITUS WITHOUT COMPLICATION, WITHOUT LONG-TERM CURRENT USE OF INSULIN: Primary | ICD-10-CM

## 2024-11-12 DIAGNOSIS — E78.5 HYPERLIPIDEMIA, UNSPECIFIED HYPERLIPIDEMIA TYPE: ICD-10-CM

## 2024-11-12 DIAGNOSIS — Z79.899 DRUG-INDUCED IMMUNODEFICIENCY: ICD-10-CM

## 2024-11-12 LAB
ALBUMIN SERPL BCP-MCNC: 3.3 G/DL (ref 3.5–5)
ALBUMIN/GLOB SERPL: 0.7 {RATIO}
ALP SERPL-CCNC: 138 U/L (ref 46–118)
ALT SERPL W P-5'-P-CCNC: 30 U/L (ref 13–56)
ANION GAP SERPL CALCULATED.3IONS-SCNC: 8 MMOL/L (ref 7–16)
AST SERPL W P-5'-P-CCNC: 32 U/L (ref 15–37)
BASOPHILS # BLD AUTO: 0.07 K/UL (ref 0–0.2)
BASOPHILS NFR BLD AUTO: 1.7 % (ref 0–1)
BASOPHILS NFR BLD MANUAL: 2 % (ref 0–1)
BILIRUB SERPL-MCNC: 0.5 MG/DL (ref ?–1.2)
BUN SERPL-MCNC: 6 MG/DL (ref 7–18)
BUN/CREAT SERPL: 6 (ref 6–20)
CALCIUM SERPL-MCNC: 9 MG/DL (ref 8.5–10.1)
CHLORIDE SERPL-SCNC: 104 MMOL/L (ref 98–107)
CHOLEST SERPL-MCNC: 141 MG/DL (ref 0–200)
CHOLEST/HDLC SERPL: 3.6 {RATIO}
CO2 SERPL-SCNC: 31 MMOL/L (ref 21–32)
CREAT SERPL-MCNC: 1 MG/DL (ref 0.55–1.02)
DIFFERENTIAL METHOD BLD: ABNORMAL
EGFR (NO RACE VARIABLE) (RUSH/TITUS): 67 ML/MIN/1.73M2
EOSINOPHIL # BLD AUTO: 0.18 K/UL (ref 0–0.5)
EOSINOPHIL NFR BLD AUTO: 4.3 % (ref 1–4)
EOSINOPHIL NFR BLD MANUAL: 3 % (ref 1–4)
ERYTHROCYTE [DISTWIDTH] IN BLOOD BY AUTOMATED COUNT: 14.7 % (ref 11.5–14.5)
EST. AVERAGE GLUCOSE BLD GHB EST-MCNC: 97 MG/DL
GLOBULIN SER-MCNC: 4.9 G/DL (ref 2–4)
GLUCOSE SERPL-MCNC: 102 MG/DL (ref 70–110)
GLUCOSE SERPL-MCNC: 102 MG/DL (ref 74–106)
HBA1C MFR BLD HPLC: 5 % (ref 4.5–6.6)
HCT VFR BLD AUTO: 35.9 % (ref 38–47)
HDLC SERPL-MCNC: 39 MG/DL (ref 40–60)
HGB BLD-MCNC: 12.1 G/DL (ref 12–16)
IMM GRANULOCYTES # BLD AUTO: 0 K/UL (ref 0–0.04)
IMM GRANULOCYTES NFR BLD: 0 % (ref 0–0.4)
LDLC SERPL CALC-MCNC: 81 MG/DL
LDLC/HDLC SERPL: 2.1 {RATIO}
LYMPHOCYTES # BLD AUTO: 1.63 K/UL (ref 1–4.8)
LYMPHOCYTES NFR BLD AUTO: 38.8 % (ref 27–41)
LYMPHOCYTES NFR BLD MANUAL: 40 % (ref 27–41)
MCH RBC QN AUTO: 28.6 PG (ref 27–31)
MCHC RBC AUTO-ENTMCNC: 33.7 G/DL (ref 32–36)
MCV RBC AUTO: 84.9 FL (ref 80–96)
MONOCYTES # BLD AUTO: 0.09 K/UL (ref 0–0.8)
MONOCYTES NFR BLD AUTO: 2.1 % (ref 2–6)
MONOCYTES NFR BLD MANUAL: 2 % (ref 2–6)
MPC BLD CALC-MCNC: 10.1 FL (ref 9.4–12.4)
NEUTROPHILS # BLD AUTO: 2.23 K/UL (ref 1.8–7.7)
NEUTROPHILS NFR BLD AUTO: 53.1 % (ref 53–65)
NEUTS SEG NFR BLD MANUAL: 53 % (ref 50–62)
NONHDLC SERPL-MCNC: 102 MG/DL
NRBC # BLD AUTO: 0 X10E3/UL
NRBC, AUTO (.00): 0 %
PLATELET # BLD AUTO: 349 K/UL (ref 150–400)
PLATELET MORPHOLOGY: NORMAL
POTASSIUM SERPL-SCNC: 3.4 MMOL/L (ref 3.5–5.1)
PROT SERPL-MCNC: 8.2 G/DL (ref 6.4–8.2)
RBC # BLD AUTO: 4.23 M/UL (ref 4.2–5.4)
RBC MORPH BLD: NORMAL
SODIUM SERPL-SCNC: 140 MMOL/L (ref 136–145)
TRIGL SERPL-MCNC: 103 MG/DL (ref 35–150)
VLDLC SERPL-MCNC: 21 MG/DL
WBC # BLD AUTO: 4.2 K/UL (ref 4.5–11)

## 2024-11-12 PROCEDURE — 3044F HG A1C LEVEL LT 7.0%: CPT | Mod: ,,, | Performed by: FAMILY MEDICINE

## 2024-11-12 PROCEDURE — 3008F BODY MASS INDEX DOCD: CPT | Mod: ,,, | Performed by: FAMILY MEDICINE

## 2024-11-12 PROCEDURE — 80053 COMPREHEN METABOLIC PANEL: CPT | Mod: ,,, | Performed by: CLINICAL MEDICAL LABORATORY

## 2024-11-12 PROCEDURE — 99214 OFFICE O/P EST MOD 30 MIN: CPT | Mod: ,,, | Performed by: FAMILY MEDICINE

## 2024-11-12 PROCEDURE — 2023F DILAT RTA XM W/O RTNOPTHY: CPT | Mod: ,,, | Performed by: FAMILY MEDICINE

## 2024-11-12 PROCEDURE — 1159F MED LIST DOCD IN RCRD: CPT | Mod: ,,, | Performed by: FAMILY MEDICINE

## 2024-11-12 PROCEDURE — 82962 GLUCOSE BLOOD TEST: CPT | Mod: ,,, | Performed by: FAMILY MEDICINE

## 2024-11-12 PROCEDURE — 3075F SYST BP GE 130 - 139MM HG: CPT | Mod: ,,, | Performed by: FAMILY MEDICINE

## 2024-11-12 PROCEDURE — 85025 COMPLETE CBC W/AUTO DIFF WBC: CPT | Mod: ,,, | Performed by: CLINICAL MEDICAL LABORATORY

## 2024-11-12 PROCEDURE — 3079F DIAST BP 80-89 MM HG: CPT | Mod: ,,, | Performed by: FAMILY MEDICINE

## 2024-11-12 PROCEDURE — 80061 LIPID PANEL: CPT | Mod: ,,, | Performed by: CLINICAL MEDICAL LABORATORY

## 2024-11-12 PROCEDURE — 83036 HEMOGLOBIN GLYCOSYLATED A1C: CPT | Mod: ,,, | Performed by: CLINICAL MEDICAL LABORATORY

## 2024-11-12 RX ORDER — TIRZEPATIDE 7.5 MG/.5ML
7.5 INJECTION, SOLUTION SUBCUTANEOUS
COMMUNITY
End: 2024-11-12 | Stop reason: SDUPTHER

## 2024-11-12 RX ORDER — TIRZEPATIDE 7.5 MG/.5ML
7.5 INJECTION, SOLUTION SUBCUTANEOUS
Qty: 4 PEN | Refills: 5 | Status: SHIPPED | OUTPATIENT
Start: 2024-11-12

## 2024-11-12 NOTE — PROGRESS NOTES
Subjective     Patient ID: Natali Claire is a 54 y.o. female.    Chief Complaint: Annual Exam (Requesting for possible increase on Mounjaro )    Doing well      Review of Systems   Constitutional:  Negative for fatigue and fever.   HENT:  Negative for nasal congestion and dental problem.    Eyes:  Negative for discharge.   Respiratory:  Negative for cough and shortness of breath.    Cardiovascular:  Negative for chest pain.   Gastrointestinal:  Negative for constipation, diarrhea, nausea and vomiting.   Genitourinary:  Negative for bladder incontinence, difficulty urinating and hot flashes.   Allergic/Immunologic: Negative for environmental allergies.   Neurological:  Negative for headaches.   Psychiatric/Behavioral:  Negative for behavioral problems and confusion.           Objective     Physical Exam  Vitals and nursing note reviewed.   Constitutional:       Appearance: Normal appearance. She is obese.   HENT:      Head: Normocephalic and atraumatic.      Right Ear: Tympanic membrane normal.      Left Ear: Tympanic membrane normal.      Nose: Nose normal.      Mouth/Throat:      Mouth: Mucous membranes are moist.   Eyes:      Extraocular Movements: Extraocular movements intact.      Conjunctiva/sclera: Conjunctivae normal.      Pupils: Pupils are equal, round, and reactive to light.   Cardiovascular:      Rate and Rhythm: Normal rate and regular rhythm.      Pulses: Normal pulses.   Pulmonary:      Effort: Pulmonary effort is normal.      Breath sounds: Normal breath sounds.   Abdominal:      General: Abdomen is flat. Bowel sounds are normal.      Palpations: Abdomen is soft.   Musculoskeletal:         General: Normal range of motion.      Cervical back: Normal range of motion and neck supple.   Skin:     General: Skin is warm and dry.   Neurological:      General: No focal deficit present.      Mental Status: She is alert and oriented to person, place, and time.   Psychiatric:         Mood and Affect: Mood normal.             Assessment and Plan     1. Type 2 diabetes mellitus without complication, without long-term current use of insulin  -     CBC Auto Differential; Future; Expected date: 11/12/2024  -     Comprehensive Metabolic Panel; Future; Expected date: 11/12/2024  -     Hemoglobin A1C; Future; Expected date: 11/12/2024  -     POCT Glucose, Hand-Held Device    2. Hyperlipidemia, unspecified hyperlipidemia type  -     Lipid Panel; Future; Expected date: 11/12/2024    3. Hypertension, unspecified type  -     CBC Auto Differential; Future; Expected date: 11/12/2024  -     Comprehensive Metabolic Panel; Future; Expected date: 11/12/2024    4. Drug-induced immunodeficiency        RTC 3 months  Will call lab results         No follow-ups on file.

## 2024-11-13 ENCOUNTER — TELEPHONE (OUTPATIENT)
Dept: FAMILY MEDICINE | Facility: CLINIC | Age: 54
End: 2024-11-13
Payer: COMMERCIAL

## 2024-11-13 ENCOUNTER — CLINICAL SUPPORT (OUTPATIENT)
Dept: REHABILITATION | Facility: HOSPITAL | Age: 54
End: 2024-11-13
Payer: COMMERCIAL

## 2024-11-13 DIAGNOSIS — Z74.09 IMPAIRED MOBILITY AND ACTIVITIES OF DAILY LIVING: Primary | Chronic | ICD-10-CM

## 2024-11-13 DIAGNOSIS — Z78.9 IMPAIRED MOBILITY AND ACTIVITIES OF DAILY LIVING: Primary | Chronic | ICD-10-CM

## 2024-11-13 PROCEDURE — 97112 NEUROMUSCULAR REEDUCATION: CPT | Mod: PN | Performed by: GENERAL ACUTE CARE HOSPITAL

## 2024-11-13 PROCEDURE — 97014 ELECTRIC STIMULATION THERAPY: CPT | Mod: PN | Performed by: GENERAL ACUTE CARE HOSPITAL

## 2024-11-13 PROCEDURE — 20560 NDL INSJ W/O NJX 1 OR 2 MUSC: CPT | Mod: PN,CG | Performed by: GENERAL ACUTE CARE HOSPITAL

## 2024-11-13 PROCEDURE — 97140 MANUAL THERAPY 1/> REGIONS: CPT | Mod: PN | Performed by: GENERAL ACUTE CARE HOSPITAL

## 2024-11-13 PROCEDURE — 97110 THERAPEUTIC EXERCISES: CPT | Mod: PN | Performed by: GENERAL ACUTE CARE HOSPITAL

## 2024-11-13 NOTE — TELEPHONE ENCOUNTER
----- Message from Nurse Quigley sent at 11/13/2024 11:14 AM CST -----  Contact: pt    ----- Message -----  From: Matilda Koch  Sent: 11/13/2024  11:05 AM CST  To: [facility] Clinical Staff    Type:  Sooner Apoointment Request    Caller is requesting a sooner appointment.  Caller declined first available appointment listed below.  Caller will not accept being placed on the waitlist and is requesting a message be sent to doctor.  Name of Caller: pt  When is the first available appointment? Pt would like an appt Wednesday, 11/20 (morning please)  Symptoms: nurse visit for shingles 2  Would the patient rather a call back or a response via [facility]?  chart  Best Call Back Number:   Additional Information:

## 2024-11-13 NOTE — PROGRESS NOTES
[facility]   Physical Therapy Treatment Note    Name: Matilda Decker  Clinic Number: [id number]    Therapy Diagnosis:   Encounter Diagnosis   Name Primary?    Impaired mobility and activities of daily living Yes     Physician: Ramo Bashir MD  Physician Orders: PT Eval and Treat   Medical Diagnosis from Referral: Plantar Fasciitis  Evaluation Date: 10/2/2024  Authorization Period Expiration: 12/31/24  Plan of Care Expiration: 12/11/24 (10 weeks)  Progress Note Due: 11/2/24  Visit # / Visits authorized: treatments: 5 (1/1 Evaluation)  FOTO: 1/3 - foot  77/100    Visit Date: 11/13/2024  Time In: 0800  Time Out: 0900  Total Appointment Time: 60 minutes  PTA Visit #: 0/5     Treatment No Show #: 0  Subjective   Pt reports: Patient returns to the patient physical therapy after missing last week's appointment due to vehicle issues. Patient states that collectively overall her pain has been reduced since starting physical therapy. Primary pain remains in the arch of her right foot with prolonged walking specifically at work.  REASSESSMENT AT NEXT SESSION    She was compliant with home exercise program.  Response to previous treatment: on-going  Functional change: on-going  Pain: 2/10  Location: bilateral feet      Objective      Objective Measures updated at progress report unless specified.     Treatment   Matilda received following skilled interventions listed below:    PT Intervention Parameters Time   Neuromuscular Re-education activities to improve: Balance, Coordination, Kinesthetic, Sense, Proprioception, and Posture  Lateral walking Red Theraband around arches x1 lap  Lateral walking Red Theraband around ankles toe walking  Heel raise with ball between heels 3x10  Heel raise hold with ball between heels 3x30s  Split stance on foma balance 3x10s  Split stance on foam balance with eyes closed 3x10s  Split stance heel raises 2x10 bilateral 25 minutes (2)      Manual Therapy  techniques Joint mobilizations, Manual traction, Myofacial release, Soft tissue Mobilization, and Friction Massage     Dry Needling to impact muscular pain, tightness, TrPs, and tissue pliability Location: Bilateral gastroc   Needle Length:40 mmx 2 needles medial & Lateral  Needle Width: 0.3mm  Patient Position: lying prone    Patient gave Written and Verbal consent to undergo dry needling. Written consent can be found in the media section in pts chart. All needles were removed and changes in signs and symptoms were assessed. No adverse reactions noted at the conclusion of the treatment.   + 6 minutes of electrical stimulation all needle s 10 minutes (1)  +  Untimed (1)  +  DN (1)     Therapeutic Exercise to develop strength, endurance, ROM, flexibility, posture, and core stabilization Standing gastroc stretch x2 minutes inclined  Standing lugne gastroc/soleus stretch x1 min bilateral (x2 trials)  Plantar fascia ball rolling with spike ball x 2 minutes bilateral  8 minutes (1)        *A portion of this treatment session is provided with the assistance of a skilled rehabilitation technician under the supervision of a licensed physical therapist.   *Billing accurately reflects 1:1 treatment time per patient's insurance guidelines.      Patient Education and Home Exercises     Home Exercises Provided and Patient Education Provided   Patient was educated on the role of PT, POC, treatment plan, discharge goals, HEP.  Patient educated on biomechanical justification for therapeutic exercise and importance of compliance with HEP in order to improve overall impairments and QOL   Patient was educated on all the above exercise prior/during/after for proper posture, positioning, and execution for safe performance with home exercise program.       Written Home Exercises Provided:   yes.   Exercises were reviewed and Matilda was able to demonstrate them prior to the end of the session.    Matilda demonstrated good  understanding of the  education provided.   See EMR under Patient Instructions for exercises provided during therapy sessions    Assessment   Patient performs well during session today added in dynamic exercises specifically with weight bearing and altered surface level to impact foot intrinsic muscles. Patient displays difficulty with full weight bearing heel raises indicating weakness in bilateral gastroc. Continued with journaling services to impact tissue pliability. Patient reports reduced pain following dry needling today. Do you think this positive progress with physical therapy planning continue once a week for at least four more sessions to impact Contineud and complaince with home exercise.    Matilda Is progressing well towards her goals.   Pt prognosis is Good.   Pt will continue to benefit from skilled outpatient physical therapy to address the deficits listed in the problem list box on initial evaluation, provide pt/family education and to maximize pt's level of independence in the home and community environment.   Pt's spiritual, cultural and educational needs considered and pt agreeable to plan of care and goals.  Anticipated barriers to physical therapy: none    Goals:  Short Term Goals Status Est. Met   Patient to be independent with foundational home exercise program performance to impact knowledge of condition  [] Met  [] Not Met  [] Progressing 10/2/2024     Patient to improve L foot PF/DF by 5 degrees both directions to improve mobility and function [] Met  [] Not Met  [] Progressing 10/2/2024     Patient to improve bilateral great toe extension to 50 degrees or greater to impact mobility and gait pattern  [] Met  [] Not Met  [] Progressing 10/2/2024     Patient to improve foot FOTO to 82 /100 to display improved activity participation [] Met  [] Not Met  [] Progressing 10/2/2024        Long Term Goal Status Est. Met   Patient will display independent and correct performance of advanced home exercise program without  cueing to impact knowledge of condition [] Met  [] Not Met  [] Progressing 10/2/2024     Patient to improve L foot PF by 10 degrees both directions to improve mobility and function [] Met  [] Not Met  [] Progressing 10/2/2024     Patient to report improved standing and walking tolerance without increase foot and leg pain  [] Met  [] Not Met  [] Progressing 10/2/2024     Patient to improve foot FOTO to 87 /100 to display improved activity participation [] Met  [] Not Met  [] Progressing 10/2/2024     Patient will report confidence in managing condition upon discharge from Physical Therapy. [] Met  [] Not Met  [] Progressing 10/2/2024        Plan   Plan of care Certification: 10/2/2024 to 12/11/24.     Outpatient Physical Therapy 1-2 times weekly for 10 weeks to include the following interventions: Gait Training, Manual Therapy, Moist Heat/ Ice, Neuromuscular Re-ed, Patient Education, Self Care, Therapeutic Activities, and Therapeutic Exercise , Electrical Stimulation (IFC, Russian), IASTM, STM, Cupping, Blood Flow Restriction as appropriate.      Brittany Curran PT, DPT, SCS, CSCS  Board Certified Sports Clinical Specialist   Certified Dry Needling Provider  11/13/2024

## 2024-11-14 ENCOUNTER — TELEPHONE (OUTPATIENT)
Dept: PRIMARY CARE CLINIC | Facility: CLINIC | Age: 54
End: 2024-11-14
Payer: MEDICARE

## 2024-11-14 DIAGNOSIS — I10 HYPERTENSION, UNSPECIFIED TYPE: ICD-10-CM

## 2024-11-14 RX ORDER — POTASSIUM CHLORIDE 20 MEQ/1
20 TABLET, EXTENDED RELEASE ORAL 2 TIMES DAILY
Qty: 180 TABLET | Refills: 3 | Status: SHIPPED | OUTPATIENT
Start: 2024-11-14

## 2024-11-14 NOTE — TELEPHONE ENCOUNTER
----- Message from Herlinda Louis MD sent at 11/13/2024  8:50 AM CST -----  Please give pt. Results - needs potassium replacement

## 2024-11-14 NOTE — PROGRESS NOTES
Subjective:         Patient ID: Natali Claire is a 54 y.o. female.    Chief Complaint: Shoulder Pain (left)        Pain  This is a chronic problem. The current episode started more than 1 year ago. The problem occurs daily. The problem has been waxing and waning. Associated symptoms include arthralgias and neck pain. Pertinent negatives include no anorexia, chest pain, chills, coughing, diaphoresis, fever, sore throat, vertigo or vomiting.     Review of Systems   Constitutional:  Negative for activity change, appetite change, chills, diaphoresis, fever and unexpected weight change.   HENT:  Negative for drooling, ear discharge, ear pain, facial swelling, nosebleeds, sore throat, trouble swallowing, voice change and goiter.    Eyes:  Negative for photophobia, pain, discharge, redness and visual disturbance.   Respiratory:  Negative for apnea, cough, choking, chest tightness, shortness of breath, wheezing and stridor.    Cardiovascular:  Negative for chest pain, palpitations and leg swelling.   Gastrointestinal:  Negative for abdominal distention, anorexia, diarrhea, rectal pain, vomiting and fecal incontinence.   Endocrine: Negative for cold intolerance, heat intolerance, polydipsia, polyphagia and polyuria.   Genitourinary:  Negative for bladder incontinence, dysuria, flank pain, frequency and hot flashes.   Musculoskeletal:  Positive for arthralgias, back pain, leg pain and neck pain.   Integumentary:  Negative for color change and pallor.   Allergic/Immunologic: Negative for immunocompromised state.   Neurological:  Negative for dizziness, vertigo, seizures, syncope, facial asymmetry, speech difficulty, light-headedness, memory loss and coordination difficulties.   Hematological:  Negative for adenopathy. Does not bruise/bleed easily.   Psychiatric/Behavioral:  Negative for agitation, behavioral problems, confusion, decreased concentration, dysphoric mood, hallucinations, self-injury and suicidal ideas. The  patient is not nervous/anxious and is not hyperactive.            Past Medical History:   Diagnosis Date    Anxiety     Arthritis     Chronic low back pain     Chronic pain syndrome     Degenerative joint disease involving multiple joints     Diabetes mellitus, type 2     GERD (gastroesophageal reflux disease)     Hyperlipidemia     Hypertension     Lumbar radiculopathy     Lumbosacral spondylosis     Multiple joint pain     Onychomycosis     Osteoarthritis     Other long term (current) drug therapy     Rheumatoid arthritis      Past Surgical History:   Procedure Laterality Date    BREAST SURGERY      CHOLECYSTECTOMY      TUBAL LIGATION       Social History     Socioeconomic History    Marital status: Single    Number of children: 2   Occupational History    Occupation: disable   Tobacco Use    Smoking status: Never     Passive exposure: Never    Smokeless tobacco: Never   Substance and Sexual Activity    Alcohol use: Not Currently    Drug use: Never    Sexual activity: Yes     Partners: Male     Social Drivers of Health     Financial Resource Strain: Medium Risk (11/9/2024)    Overall Financial Resource Strain (CARDIA)     Difficulty of Paying Living Expenses: Somewhat hard   Food Insecurity: Food Insecurity Present (11/9/2024)    Hunger Vital Sign     Worried About Running Out of Food in the Last Year: Sometimes true     Ran Out of Food in the Last Year: Sometimes true   Transportation Needs: No Transportation Needs (10/10/2024)    PRAPARE - Transportation     Lack of Transportation (Medical): No     Lack of Transportation (Non-Medical): No   Physical Activity: Insufficiently Active (11/9/2024)    Exercise Vital Sign     Days of Exercise per Week: 2 days     Minutes of Exercise per Session: 60 min   Stress: No Stress Concern Present (11/9/2024)    [employer] - Occupational Stress Questionnaire     Feeling of Stress : Only a little   Housing Stability: Low Risk  (11/9/2024)    Housing  "Stability Vital Sign     Unable to Pay for Housing in the Last Year: No     Homeless in the Last Year: No     Family History   Problem Relation Name Age of Onset    Hypertension Mother      Diabetes Mother      Hypertension Father      Heart disease Father      Diabetes Sister      Breast cancer Paternal Cousin       Review of patient's allergies indicates:  No Known Allergies     Objective:  Vitals:    12/02/24 1438 12/02/24 1439   BP: (!) 162/75    Pulse: 75    Weight: 120.2 kg (265 lb)    Height: 5' 9" (1.753 m)    PainSc:   4   4                 Physical Exam  Vitals and nursing note reviewed. Exam conducted with a chaperone present.   Constitutional:       General: She is awake. She is not in acute distress.     Appearance: Normal appearance. She is not ill-appearing, toxic-appearing or diaphoretic.   HENT:      Head: Normocephalic and atraumatic.      Nose: Nose normal.      Mouth/Throat:      Mouth: Mucous membranes are moist.      Pharynx: Oropharynx is clear.   Eyes:      Conjunctiva/sclera: Conjunctivae normal.      Pupils: Pupils are equal, round, and reactive to light.   Cardiovascular:      Rate and Rhythm: Normal rate.   Pulmonary:      Effort: Pulmonary effort is normal. No respiratory distress.   Abdominal:      Palpations: Abdomen is soft.   Musculoskeletal:         General: Normal range of motion.      Cervical back: Normal range of motion and neck supple. Tenderness present.      Thoracic back: Tenderness present.      Lumbar back: Tenderness present.   Skin:     General: Skin is warm and dry.   Neurological:      General: No focal deficit present.      Mental Status: She is alert and oriented to person, place, and time. Mental status is at baseline.      Cranial Nerves: No cranial nerve deficit (II-XII).   Psychiatric:         Mood and Affect: Mood normal.         Behavior: Behavior normal. Behavior is cooperative.         Thought Content: Thought content normal.           Mammo Digital Screening " Bilat w/ Kael  Narrative: Facility:  [address]  [place] [id number] [phone]    Name: Natali Claire    MRN: [id number]    Result:  Mammo Digital Screening Bilat w/ Kael    History:  Patient is 54 y.o. and is seen for a screening mammogram.    Films Compared:  Compared to: 10/24/2023 Mammo Digital Screening Bilat w/ Kael and   10/18/2022 Mammo Digital Screening Bilat     Findings:  This procedure was performed using tomosynthesis.   Computer-aided detection was utilized in the interpretation of this   examination.    There are scattered areas of fibroglandular density. There is no evidence   of suspicious masses, microcalcifications or architectural distortion.  Impression:    No mammographic evidence of malignancy.    BI-RADS Category 1: Negative    Recommendation:  Routine screening mammogram in 1 year is recommended.    Your estimated lifetime risk of breast cancer (to age 85) based on   Tyrer-Cuzick risk assessment model is 6.78%.  According to the American   Cancer Society, patients with a lifetime breast cancer risk of 20% or   higher might benefit from supplemental screening tests, such as screening   breast MRI.    Kelechi Leon MD         Office Visit on 11/12/2024   Component Date Value Ref Range Status    Sodium 11/12/2024 140  136 - 145 mmol/L Final    Potassium 11/12/2024 3.4 (L)  3.5 - 5.1 mmol/L Final    Chloride 11/12/2024 104  98 - 107 mmol/L Final    CO2 11/12/2024 31  21 - 32 mmol/L Final    Anion Gap 11/12/2024 8  7 - 16 mmol/L Final    Glucose 11/12/2024 102  74 - 106 mg/dL Final    BUN 11/12/2024 6 (L)  7 - 18 mg/dL Final    Creatinine 11/12/2024 1.00  0.55 - 1.02 mg/dL Final    BUN/Creatinine Ratio 11/12/2024 6  6 - 20 Final    Calcium 11/12/2024 9.0  8.5 - 10.1 mg/dL Final    Total Protein 11/12/2024 8.2  6.4 - 8.2 g/dL Final    Albumin 11/12/2024 3.3 (L)  3.5 - 5.0 g/dL Final    Globulin 11/12/2024 4.9 (H)  2.0 - 4.0 g/dL Final    A/G Ratio  11/12/2024 0.7   Final    Bilirubin, Total 11/12/2024 0.5  >0.0 - 1.2 mg/dL Final    Alk Phos 11/12/2024 138 (H)  46 - 118 U/L Final    ALT 11/12/2024 30  13 - 56 U/L Final    AST 11/12/2024 32  15 - 37 U/L Final    eGFR 11/12/2024 67  >=60 mL/min/1.73m2 Final    Hemoglobin A1C 11/12/2024 5.0  4.5 - 6.6 % Final    Estimated Average Glucose 11/12/2024 97  mg/dL Final    Triglycerides 11/12/2024 103  35 - 150 mg/dL Final    Cholesterol 11/12/2024 141  0 - 200 mg/dL Final    HDL Cholesterol 11/12/2024 39 (L)  40 - 60 mg/dL Final    Cholesterol/HDL Ratio (Risk Factor) 11/12/2024 3.6   Final    Non-HDL 11/12/2024 102  mg/dL Final    LDL Calculated 11/12/2024 81  mg/dL Final    LDL/HDL 11/12/2024 2.1   Final    VLDL 11/12/2024 21  mg/dL Final    WBC 11/12/2024 4.20 (L)  4.50 - 11.00 K/uL Final    RBC 11/12/2024 4.23  4.20 - 5.40 M/uL Final    Hemoglobin 11/12/2024 12.1  12.0 - 16.0 g/dL Final    Hematocrit 11/12/2024 35.9 (L)  38.0 - 47.0 % Final    MCV 11/12/2024 84.9  80.0 - 96.0 fL Final    MCH 11/12/2024 28.6  27.0 - 31.0 pg Final    MCHC 11/12/2024 33.7  32.0 - 36.0 g/dL Final    RDW 11/12/2024 14.7 (H)  11.5 - 14.5 % Final    Platelet Count 11/12/2024 349  150 - 400 K/uL Final    MPV 11/12/2024 10.1  9.4 - 12.4 fL Final    Neutrophils % 11/12/2024 53.1  53.0 - 65.0 % Final    Lymphocytes % 11/12/2024 38.8  27.0 - 41.0 % Final    Monocytes % 11/12/2024 2.1  2.0 - 6.0 % Final    Eosinophils % 11/12/2024 4.3 (H)  1.0 - 4.0 % Final    Basophils % 11/12/2024 1.7 (H)  0.0 - 1.0 % Final    Immature Granulocytes % 11/12/2024 0.0  0.0 - 0.4 % Final    nRBC, Auto 11/12/2024 0.0  <=0.0 % Final    Neutrophils, Abs 11/12/2024 2.23  1.80 - 7.70 K/uL Final    Lymphocytes, Absolute 11/12/2024 1.63  1.00 - 4.80 K/uL Final    Monocytes, Absolute 11/12/2024 0.09  0.00 - 0.80 K/uL Final    Eosinophils, Absolute 11/12/2024 0.18  0.00 - 0.50 K/uL Final    Basophils, Absolute 11/12/2024 0.07  0.00 - 0.20 K/uL Final    Immature  Granulocytes, Absolute 11/12/2024 0.00  0.00 - 0.04 K/uL Final    nRBC, Absolute 11/12/2024 0.00  <=0.00 x10e3/uL Final    Diff Type 11/12/2024 Manual   Final    POC Glucose 11/12/2024 102  70 - 110 MG/DL Final    Segmented Neutrophils, Man % 11/12/2024 53  50 - 62 % Final    Lymphocytes, Man % 11/12/2024 40  27 - 41 % Final    Monocytes, Man % 11/12/2024 2  2 - 6 % Final    Eosinophils, Man % 11/12/2024 3  1 - 4 % Final    Basophils, Man % 11/12/2024 2 (H)  0 - 1 % Final    Platelet Morphology 11/12/2024 Normal  Normal Final    RBC Morphology 11/12/2024 Normal   Final   Office Visit on 10/02/2024   Component Date Value Ref Range Status    POC Amphetamines 10/02/2024 Negative  Negative, Inconclusive Final    POC Barbiturates 10/02/2024 Negative  Negative, Inconclusive Final    POC Benzodiazepines 10/02/2024 Negative  Negative, Inconclusive Final    POC Cocaine 10/02/2024 Negative  Negative, Inconclusive Final    POC THC 10/02/2024 Negative  Negative, Inconclusive Final    POC Methadone 10/02/2024 Negative  Negative, Inconclusive Final    POC Methamphetamine 10/02/2024 Negative  Negative, Inconclusive Final    POC Opiates 10/02/2024 Negative  Negative, Inconclusive Final    POC Oxycodone 10/02/2024 Negative  Negative, Inconclusive Final    POC Phencyclidine 10/02/2024 Negative  Negative, Inconclusive Final    POC Methylenedioxymethamphetamine * 10/02/2024 Negative  Negative, Inconclusive Final    POC Tricyclic Antidepressants 10/02/2024 Negative  Negative, Inconclusive Final    POC Buprenorphine 10/02/2024 Negative   Final     Acceptable 10/02/2024 Yes   Final    POC Temperature (Urine) 10/02/2024 92   Final    Creatinine, U 10/02/2024 41.9  mg/dL Final    Specific Gravity 10/02/2024 1.008   Final    pH 10/02/2024 5.7   Final    Oxidants 10/02/2024 Negative  Cutoff: 200 mg/L Final    Comment 10/02/2024 Normal   Final    Codeine 10/02/2024 Not Detected  Cutoff: 25 ng/mL Final    C6BG 10/02/2024 Not  Detected  Cutoff: 100 ng/mL Final    Morphine 10/02/2024 Not Detected  Cutoff: 25 ng/mL Final    M6BG 10/02/2024 Not Detected  Cutoff: 100 ng/mL Final    6 Monoacetylmorphine 10/02/2024 Not Detected  Cutoff: 25 ng/mL Final    Hydrocodone 10/02/2024 Present (A)  Cutoff: 25 ng/mL Final    Norhydrocodone 10/02/2024 Present (A)  Cutoff: 25 ng/mL Final    Dihydrocodeine 10/02/2024 Present (A)  Cutoff: 25 ng/mL Final    Hydromorphone 10/02/2024 Not Detected  Cutoff: 25 ng/mL Final    Hydromorphone-3-beta-glucuronide 10/02/2024 Not Detected  Cutoff: 100 ng/mL Final    Oxycodone 10/02/2024 Not Detected  Cutoff: 25 ng/mL Final    Noroxycodone 10/02/2024 Not Detected  Cutoff: 25 ng/mL Final    Oxymorphone 10/02/2024 Not Detected  Cutoff: 25 ng/mL Final    Oxymorphone-3-beta-glucuronid 10/02/2024 Not Detected  Cutoff: 100 ng/mL Final    Noroxymorphone 10/02/2024 Not Detected  Cutoff: 25 ng/mL Final    Fentanyl 10/02/2024 Not Detected  Cutoff: 2 ng/mL Final    Norfentanyl 10/02/2024 Not Detected  Cutoff: 2 ng/mL Final    Meperidine 10/02/2024 Not Detected  Cutoff: 25 ng/mL Final    Normeperidine 10/02/2024 Not Detected  Cutoff: 25 ng/mL Final    Naloxone 10/02/2024 Not Detected  Cutoff: 25 ng/mL Final    Naloxone-3-beta-glucuronide 10/02/2024 Not Detected  Cutoff: 100 ng/mL Final    Methadone 10/02/2024 Not Detected  Cutoff: 25 ng/mL Final    EDDP 10/02/2024 Not Detected  Cutoff: 25 ng/mL Final    Propoxyphene 10/02/2024 Not Detected  Cutoff: 25 ng/mL Final    Norpropoxyphene 10/02/2024 Not Detected  Cutoff: 25 ng/mL Final    Tramadol 10/02/2024 Not Detected  Cutoff: 25 ng/mL Final    O-desmethyltramadol 10/02/2024 Not Detected  Cutoff: 25 ng/mL Final    Tapentadol 10/02/2024 Not Detected  Cutoff: 25 ng/mL Final    N-Desmethyltapentadol 10/02/2024 Not Detected  Cutoff: 50 ng/mL Final    M TAPENTADOL-BETA-GLUCURONIDE 10/02/2024 Not Detected  Cutoff: 100 ng/mL Final    Buprenorphine 10/02/2024 Not Detected  Cutoff: 5 ng/mL Final     Norbuprenorphine 10/02/2024 Not Detected  Cutoff: 5 ng/mL Final    Norbuprenorphine glucuronide 10/02/2024 Not Detected  Cutoff: 20 ng/mL Final    Opioid Interpretation 10/02/2024 SEE COMMENTS   Final    Cocaine 10/02/2024 Negative  Cutoff: 150 ng/mL Final    Tetrahydrocannabinol 10/02/2024 Negative  Cutoff: 50 ng/mL Final    Alprazolam 10/02/2024 Not Detected  Cutoff: 10 ng/mL Final    Alpha-Hydroxyalprazolam 10/02/2024 Not Detected  Cutoff: 10 ng/mL Final    Alpha-Hydroxyalprazolam Glucuronide 10/02/2024 Not Detected  Cutoff: 50 ng/mL Final    Chlordiazepoxide 10/02/2024 Not Detected  Cutoff: 10 ng/mL Final    Clobazam 10/02/2024 Not Detected  Cutoff: 10 ng/mL Final    N-Desmethylclobazam 10/02/2024 Not Detected  Cutoff: 200 ng/mL Final    Clonazepam 10/02/2024 Not Detected  Cutoff: 10 ng/mL Final    7-aminoclonazepam 10/02/2024 Not Detected  Cutoff: 10 ng/mL Final    Diazepam 10/02/2024 Not Detected  Cutoff: 10 ng/mL Final    Nordiazepam 10/02/2024 Not Detected  Cutoff: 10 ng/mL Final    Flunitrazepam 10/02/2024 Not Detected  Cutoff: 10 ng/mL Final    7-aminoflunitrazepam 10/02/2024 Not Detected  Cutoff: 10 ng/mL Final    Flurazepam 10/02/2024 Not Detected  Cutoff: 10 ng/mL Final    2-Hydroxy Ethyl Flurazepam 10/02/2024 Not Detected  Cutoff: 10 ng/mL Final    Lorazepam 10/02/2024 Not Detected  Cutoff: 10 ng/mL Final    Lorazepam Glucuronide 10/02/2024 Not Detected  Cutoff: 50 ng/mL Final    Midazolam 10/02/2024 Not Detected  Cutoff: 10 ng/mL Final    Alpha-Hydroxy Midazolam 10/02/2024 Not Detected  Cutoff: 10 ng/mL Final    Oxazepam 10/02/2024 Not Detected  Cutoff: 10 ng/mL Final    Oxazepam Glucuronide 10/02/2024 Not Detected  Cutoff: 50 ng/mL Final    Prazepam 10/02/2024 Not Detected  Cutoff: 10 ng/mL Final    Temazepam 10/02/2024 Not Detected  Cutoff: 10 ng/mL Final    Temazepam Glucuronide 10/02/2024 Not Detected  Cutoff: 50 ng/mL Final    Triazolam 10/02/2024 Not Detected  Cutoff: 10 ng/mL Final     Alpha-Hydroxy Triazolam 10/02/2024 Not Detected  Cutoff: 10 ng/mL Final    Zolpidem 10/02/2024 Not Detected  Cutoff: 10 ng/mL Final    Zolpidem Phenyl-4-Carboxylic acid 10/02/2024 Not Detected  Cutoff: 10 ng/mL Final    Benzodiazepine Interpretation 10/02/2024 SEE COMMENTS   Final    List Patient's Current Medications 10/02/2024 na   Final    Methamphetamine 10/02/2024 Not Detected  Cutoff: 100 ng/mL Final    Amphetamine 10/02/2024 Not Detected  Cutoff: 100 ng/mL Final    3,4-methylenedioxymethamphetamine * 10/02/2024 Not Detected  Cutoff: 100 ng/mL Final    3,3-arqrzprivaplda-A-ethylamphetam* 10/02/2024 Not Detected  Cutoff: 100 ng/mL Final    3,4-methylenedioxyamphetamine (MDA) 10/02/2024 Not Detected  Cutoff: 100 ng/mL Final    Ephedrine 10/02/2024 Not Detected  Cutoff: 100 ng/mL Final    Pseudoephedrine 10/02/2024 Not Detected  Cutoff: 100 ng/mL Final    Phentermine 10/02/2024 Not Detected  Cutoff: 100 ng/mL Final    Phencyclidine (PCP) 10/02/2024 Not Detected  Cutoff: 20 ng/mL Final    Methylphenidate 10/02/2024 Not Detected  Cutoff: 20 ng/mL Final    Ritalinic acid 10/02/2024 Not Detected  Cutoff: 100 ng/mL Final    Stimulant Interpretation 10/02/2024 SEE COMMENTS   Final    Barbiturates 10/02/2024 Negative  Cutoff: 200 ng/mL Final   Patient Outreach on 07/18/2024   Component Date Value Ref Range Status    Left Eye DM Retinopathy 07/01/2024 Negative   Final    Right Eye DM Retinopathy 07/01/2024 Negative   Final   Office Visit on 06/05/2024   Component Date Value Ref Range Status    POC Amphetamines 06/05/2024 Negative  Negative, Inconclusive Final    POC Barbiturates 06/05/2024 Negative  Negative, Inconclusive Final    POC Benzodiazepines 06/05/2024 Negative  Negative, Inconclusive Final    POC Cocaine 06/05/2024 Negative  Negative, Inconclusive Final    POC THC 06/05/2024 Negative  Negative, Inconclusive Final    POC Methadone 06/05/2024 Negative  Negative, Inconclusive Final    POC Methamphetamine  06/05/2024 Negative  Negative, Inconclusive Final    POC Opiates 06/05/2024 Presumptive Positive (A)  Negative, Inconclusive Final    POC Oxycodone 06/05/2024 Negative  Negative, Inconclusive Final    POC Phencyclidine 06/05/2024 Negative  Negative, Inconclusive Final    POC Methylenedioxymethamphetamine * 06/05/2024 Negative  Negative, Inconclusive Final    POC Tricyclic Antidepressants 06/05/2024 Negative  Negative, Inconclusive Final    POC Buprenorphine 06/05/2024 Negative   Final     Acceptable 06/05/2024 Yes   Final    POC Temperature (Urine) 06/05/2024 92   Final         No orders of the defined types were placed in this encounter.        Requested Prescriptions     Signed Prescriptions Disp Refills    HYDROcodone-acetaminophen (NORCO)  mg per tablet 60 tablet 0     Sig: Take 1 tablet by mouth every 12 (twelve) hours as needed for Pain.    HYDROcodone-acetaminophen (NORCO)  mg per tablet 60 tablet 0     Sig: Take 1 tablet by mouth every 12 (twelve) hours as needed for Pain.       Assessment:     1. Rheumatoid arthritis involving multiple sites with positive rheumatoid factor    2. Lumbosacral spondylosis without myelopathy    3. Cervical radiculopathy                 A's of Opioid Risk Assessment  Activity:Patient can perform ADL.   Analgesia:Patients pain is partially controlled by current medication. Patient has tried OTC medications such as Tylenol and Ibuprofen with out relief.   Adverse Effects: Patient denies constipation or sedation.  Aberrant Behavior:  reviewed with no aberrant drug seeking/taking behavior.  Overdose reversal drug naloxone discussed    Drug screen reviewed      X-ray lumbar spine [facility] November 11, 2020, multiple level degenerative changes no instability noted with flexion-extension no fracture noted      Plan:     Nellie Morgan February 2023    Follows rheumatology rheumatoid arthritis [facility]    She has no new complaints she states current  medications helping control her discomfort     She would like to continue with current treatment plan    Will continue home exercise program as directed    Continue current medication    Follow-up 2 months    Dr. Claire, October 2025    Bring original prescription medication bottles/container/box with labels to each visit

## 2024-11-20 ENCOUNTER — CLINICAL SUPPORT (OUTPATIENT)
Dept: REHABILITATION | Facility: HOSPITAL | Age: 54
End: 2024-11-20
Payer: COMMERCIAL

## 2024-11-20 ENCOUNTER — TELEPHONE (OUTPATIENT)
Dept: FAMILY MEDICINE | Facility: CLINIC | Age: 54
End: 2024-11-20
Payer: COMMERCIAL

## 2024-11-20 DIAGNOSIS — Z78.9 IMPAIRED MOBILITY AND ACTIVITIES OF DAILY LIVING: Primary | Chronic | ICD-10-CM

## 2024-11-20 DIAGNOSIS — Z74.09 IMPAIRED MOBILITY AND ACTIVITIES OF DAILY LIVING: Primary | Chronic | ICD-10-CM

## 2024-11-20 PROCEDURE — 97112 NEUROMUSCULAR REEDUCATION: CPT | Mod: PN | Performed by: GENERAL ACUTE CARE HOSPITAL

## 2024-11-20 PROCEDURE — 97140 MANUAL THERAPY 1/> REGIONS: CPT | Mod: PN,CG | Performed by: GENERAL ACUTE CARE HOSPITAL

## 2024-11-20 PROCEDURE — 97110 THERAPEUTIC EXERCISES: CPT | Mod: PN | Performed by: GENERAL ACUTE CARE HOSPITAL

## 2024-11-20 PROCEDURE — 97530 THERAPEUTIC ACTIVITIES: CPT | Mod: PN | Performed by: GENERAL ACUTE CARE HOSPITAL

## 2024-11-20 PROCEDURE — 97014 ELECTRIC STIMULATION THERAPY: CPT | Mod: PN | Performed by: GENERAL ACUTE CARE HOSPITAL

## 2024-11-20 PROCEDURE — 20560 NDL INSJ W/O NJX 1 OR 2 MUSC: CPT | Mod: PN,CG | Performed by: GENERAL ACUTE CARE HOSPITAL

## 2024-11-20 NOTE — PROGRESS NOTES
[facility]   Physical Therapy Treatment Note & Reassessment   Name: Matilda Decker  Clinic Number: [id number]    Therapy Diagnosis:   Encounter Diagnosis   Name Primary?    Impaired mobility and activities of daily living Yes     Physician: Ramo Bashir MD  Physician Orders: PT Eval and Treat   Medical Diagnosis from Referral: Plantar Fasciitis  Evaluation Date: 10/2/2024  Authorization Period Expiration: 12/31/24  Plan of Care Expiration: 12/11/24 (10 weeks)  Progress Note Due: 12/20/24  Visit # / Visits authorized: treatments: 5 (1/1 Evaluation)  FOTO: 1/3 - foot  77/100    Visit Date: 11/20/2024  Time In: 0800  Time Out: 0900  Total Appointment Time: 60 minutes  PTA Visit #: 0/5     Treatment No Show #: 0  Subjective   Pt reports: Patient returns to outpatient physical therapy reporting improvements with symptoms in the right foot.  patient reports that she was able to obtain a new pair of shoes to wear at work and this has improved symptoms throughout the day.  Measure active range of motion at next session    She was compliant with home exercise program.  Response to previous treatment: on-going  Functional change: on-going  Pain: 2/10  Location: bilateral feet      Objective      Objective Measures updated at progress report unless specified.     Treatment   Matilda received following skilled interventions listed below:      CPT Interventions & Parameters  Date: 11/20/24   TE Standing incline gastroc str x 2 min  Standing incline lunge stretch x1 min R &L side   Juaquin ball rolling x2 min   NMR Eccentric heel raises with 3s lower x30 reps    TA RTB lateral walks x1 lap  Lateral diagonal walks RTB x 1 lap  Hip flexor raises x15    NMR Leg press 1B heel raises 2x10   Single leg press 1B x15 R &L   MT   + UES Dry Needling: patient prone lying   Medial Gastroc 40mm x 2 bilateral  Lateral Gastroc 40mm x 2 bilateral  e-stim x 6 minutes bilateral   TE Juaquin ball rolling x 2 minutes  both feet  FOTO-Ankle Update          PLAN        Timed CPT Codes available for Billin(15) minutes of Therapeutic Exercise 99762 (TE) to develop: strength, endurance, ROM, flexibility, posture, and core stabilization  1(10) minutes of Neuromuscular Re-education 80742 (NMR) activities to improve: Balance, Coordination, Kinesthetic, Sense, Proprioception, and Posture   1(15) minutes of Therapeutic Activities 87485 (TA) to improve: functional performance, impact activities of daily living  (18) minutes of Manual Therapy 52501 (MT) techniques to improve: Joint mobilizations, Manual traction, Myofacial release, Soft tissue Mobilization, and Friction Massage   ( ) minutes of Gait Training 12442(GT) to improve: functional mobility, gait mechanics, safety with locomotion        Untimed CPT Codes available for Billing:   (X) Dry Needling 33984 (DN 1-2) to impact muscular pain, tightness, trigger points, and tissue pliability  ( ) Dry Needling 41335 (DN 3+) to impact muscular pain, tightness, trigger points, and tissue pliability  (X) Unattended Electrical Stimulation 68118 (UES) for muscle performance or pain modulation  ( ) Heat/Cold Modalities 47750 (H/C) to impact pain   ( ) Mechanical Traction 66606 (T) to impact pain, tissue pliability, range of motion     *A portion of this treatment session is provided with the assistance of a skilled rehabilitation technician under the supervision of a licensed physical therapist.   *Billing accurately reflects 1:1 treatment time per patient's insurance guidelines.     Patient Education and Home Exercises     Home Exercises Provided and Patient Education Provided   Patient was educated on the role of PT, POC, treatment plan, discharge goals, HEP.  Patient educated on biomechanical justification for therapeutic exercise and importance of compliance with HEP in order to improve overall impairments and QOL   Patient was educated on all the above exercise prior/during/after for  proper posture, positioning, and execution for safe performance with home exercise program.       Written Home Exercises Provided:   yes.   Exercises were reviewed and Matilda was able to demonstrate them prior to the end of the session.    Matilda demonstrated good  understanding of the education provided.   See EMR under Patient Instructions for exercises provided during therapy sessions    Assessment   Patient performed well during physical therapy session. She displays improvements in  functional activity performance and standing tolerance as compared to initial evaluation. There's still an obvious strength deficit with the right foot and ankle as compared to the left foot and ankle. Patient is improving towards currently established goals and continuation with physical therapy 1x weekly is indicated for patient.    Matilda Is progressing well towards her goals.   Pt prognosis is Good.   Pt will continue to benefit from skilled outpatient physical therapy to address the deficits listed in the problem list box on initial evaluation, provide pt/family education and to maximize pt's level of independence in the home and community environment.   Pt's spiritual, cultural and educational needs considered and pt agreeable to plan of care and goals.  Anticipated barriers to physical therapy: none    Goals:  Short Term Goals Status Est. Met   Patient to be independent with foundational home exercise program performance to impact knowledge of condition  [x] Met  [] Not Met  [] Progressing 10/2/2024  11/20/24   Patient to improve L foot PF/DF by 5 degrees both directions to improve mobility and function [] Met  [] Not Met  [] Progressing 10/2/2024     Patient to improve bilateral great toe extension to 50 degrees or greater to impact mobility and gait pattern  [] Met  [] Not Met  [] Progressing 10/2/2024     Patient to improve foot FOTO to 82 /100 to display improved activity participation 77/100 (same as evaluation) [] Met  [x]  Not Met  [] Progressing 10/2/2024        Long Term Goal Status Est. Met   Patient will display independent and correct performance of advanced home exercise program without cueing to impact knowledge of condition [x] Met  [] Not Met  [] Progressing 10/2/2024  11/20/24   Patient to improve L foot PF by 10 degrees both directions to improve mobility and function [] Met  [] Not Met  [] Progressing 10/2/2024     Patient to report improved standing and walking tolerance without increase foot and leg pain  [x] Met  [] Not Met  [] Progressing 10/2/2024  11/20/24   Patient to improve foot FOTO to 87 /100 to display improved activity participation [] Met  [] Not Met  [x] Progressing 10/2/2024     Patient will report confidence in managing condition upon discharge from Physical Therapy. [] Met  [] Not Met  [x] Progressing 10/2/2024        Plan   Plan of care Certification: 10/2/2024 to 12/11/24.     Outpatient Physical Therapy 1-2 times weekly for 10 weeks to include the following interventions: Gait Training, Manual Therapy, Moist Heat/ Ice, Neuromuscular Re-ed, Patient Education, Self Care, Therapeutic Activities, and Therapeutic Exercise , Electrical Stimulation (IFC, Russian), IASTM, STM, Cupping, Blood Flow Restriction as appropriate.    Brittany Curran PT, DPT, SCS, CSCS  Board Certified Sports Clinical Specialist   Certified Dry Needling Provider  11/20/2024

## 2024-11-21 ENCOUNTER — TELEPHONE (OUTPATIENT)
Dept: FAMILY MEDICINE | Facility: CLINIC | Age: 54
End: 2024-11-21
Payer: COMMERCIAL

## 2024-11-21 DIAGNOSIS — Z23 IMMUNIZATION DUE: Primary | ICD-10-CM

## 2024-11-22 ENCOUNTER — CLINICAL SUPPORT (OUTPATIENT)
Dept: FAMILY MEDICINE | Facility: CLINIC | Age: 54
End: 2024-11-22
Payer: COMMERCIAL

## 2024-11-22 DIAGNOSIS — Z23 IMMUNIZATION DUE: Primary | ICD-10-CM

## 2024-11-22 PROCEDURE — 99999 PR PBB SHADOW E&M-EST. PATIENT-LVL I: CPT | Mod: PBBFAC,,,

## 2024-11-27 ENCOUNTER — CLINICAL SUPPORT (OUTPATIENT)
Dept: REHABILITATION | Facility: HOSPITAL | Age: 54
End: 2024-11-27
Payer: COMMERCIAL

## 2024-11-27 DIAGNOSIS — Z74.09 IMPAIRED MOBILITY AND ACTIVITIES OF DAILY LIVING: Primary | Chronic | ICD-10-CM

## 2024-11-27 DIAGNOSIS — Z78.9 IMPAIRED MOBILITY AND ACTIVITIES OF DAILY LIVING: Primary | Chronic | ICD-10-CM

## 2024-11-27 PROCEDURE — 97110 THERAPEUTIC EXERCISES: CPT | Mod: PN | Performed by: GENERAL ACUTE CARE HOSPITAL

## 2024-11-27 PROCEDURE — 97112 NEUROMUSCULAR REEDUCATION: CPT | Mod: PN | Performed by: GENERAL ACUTE CARE HOSPITAL

## 2024-11-27 PROCEDURE — 97014 ELECTRIC STIMULATION THERAPY: CPT | Mod: PN | Performed by: GENERAL ACUTE CARE HOSPITAL

## 2024-11-27 PROCEDURE — 20561 NDL INSJ W/O NJX 3+ MUSC: CPT | Mod: PN,CG | Performed by: GENERAL ACUTE CARE HOSPITAL

## 2024-11-27 NOTE — PROGRESS NOTES
[facility]   Physical Therapy Treatment Note   Name: Matilda Decker  Clinic Number: [id number]    Therapy Diagnosis:   Encounter Diagnosis   Name Primary?    Impaired mobility and activities of daily living Yes     Physician: Ramo Bashir MD  Physician Orders: PT Eval and Treat   Medical Diagnosis from Referral: Plantar Fasciitis  Evaluation Date: 10/2/2024  Authorization Period Expiration: 12/31/24  Plan of Care Expiration: 12/11/24 (10 weeks)  Progress Note Due: 12/20/24  Visit # / Visits authorized: treatments: 6 (1/1 Evaluation)  FOTO: 1/3 - foot  77/100    Visit Date: 11/27/2024  Time In: 0800  Time Out: 0900  Total Appointment Time: 60 minutes  PTA Visit #: 0/5     Treatment No Show #: 0  Subjective   Pt reports: Patient returns to physical therapy. She reports continued improvements with symptoms. Ill be at slow. She does report that each week. Her tolerance with standing is improving and pain and pressure and the right foot is reducing.   Measure active range of motion at next session  FOTO at next session    She was compliant with home exercise program.  Response to previous treatment: on-going  Functional change: on-going  Pain: 2/10  Location: bilateral feet      Objective      Objective Measures updated at progress report unless specified.     Treatment   Matilda received following skilled interventions listed below:      CPT Interventions & Parameters  Date: 11/27/24   TE Standing incline gastroc str x 2 min   NMR Eccentric heel raises with 3s lower x10 reps-bilateral (single leg at step)    TE Juaquin ball rolling x2 min david   NMR Standing arch raises x10 reps (both legs)  Split stance arch raises x10 reps bilateral   Seated heel raise - 40# kettlebell x25 reps R    TE Juaquin ball rolling x2 min david    NMR Bridges 3x10 - hooklying   Sidelying clamshells 2x10 bilateral    TE Juaquin ball rolling x 2 min david    TA Green theraband lateral walks x2 laps - flat feet x 2 laps  heel raise    MT   + UES Dry Needling: patient prone lying   Medial Gastroc 40mm x 2 bilateral  Lateral Gastroc 40mm x 2 bilateral  e-stim x 6 minutes bilateral         PLAN Progress single leg       Timed CPT Codes available for Billin(8) minutes of Therapeutic Exercise 06581 (TE) to develop: strength, endurance, ROM, flexibility, posture, and core stabilization  2(25) minutes of Neuromuscular Re-education 95269 (NMR) activities to improve: Balance, Coordination, Kinesthetic, Sense, Proprioception, and Posture   (5) minutes of Therapeutic Activities 76278 (TA) to improve: functional performance, impact activities of daily living  1(8) minutes of Manual Therapy 53206 (MT) techniques to improve: Joint mobilizations, Manual traction, Myofacial release, Soft tissue Mobilization, and Friction Massage   ( ) minutes of Gait Training 54116(GT) to improve: functional mobility, gait mechanics, safety with locomotion        Untimed CPT Codes available for Billing:   (X) Dry Needling 76453 (DN 1-2) to impact muscular pain, tightness, trigger points, and tissue pliability  ( ) Dry Needling 92855 (DN 3+) to impact muscular pain, tightness, trigger points, and tissue pliability  (X) Unattended Electrical Stimulation 29096 (UES) for muscle performance or pain modulation  ( ) Heat/Cold Modalities 21406 (H/C) to impact pain   ( ) Mechanical Traction 86187 (T) to impact pain, tissue pliability, range of motion     *A portion of this treatment session is provided with the assistance of a skilled rehabilitation technician under the supervision of a licensed physical therapist.   *Billing accurately reflects 1:1 treatment time per patient's insurance guidelines.     Patient Education and Home Exercises     Home Exercises Provided and Patient Education Provided   Patient was educated on the role of PT, POC, treatment plan, discharge goals, HEP.  Patient educated on biomechanical justification for therapeutic exercise and importance  of compliance with HEP in order to improve overall impairments and QOL   Patient was educated on all the above exercise prior/during/after for proper posture, positioning, and execution for safe performance with home exercise program.       Written Home Exercises Provided:   yes.   Exercises were reviewed and Matilda was able to demonstrate them prior to the end of the session.    Matilda demonstrated good  understanding of the education provided.   See EMR under Patient Instructions for exercises provided during therapy sessions    Assessment   Patient continues to rest well with current plan of physical therapy, able to progress weight-bearing tasks as well as resistance with exercise exercises. Marjorie continues to be positive intervention to reduce muscular tightness and symptoms in bilateral gastroc.    Matilda Is progressing well towards her goals.   Pt prognosis is Good.   Pt will continue to benefit from skilled outpatient physical therapy to address the deficits listed in the problem list box on initial evaluation, provide pt/family education and to maximize pt's level of independence in the home and community environment.   Pt's spiritual, cultural and educational needs considered and pt agreeable to plan of care and goals.  Anticipated barriers to physical therapy: none    Goals:  Short Term Goals Status Est. Met   Patient to be independent with foundational home exercise program performance to impact knowledge of condition  [x] Met  [] Not Met  [] Progressing 10/2/2024  11/20/24   Patient to improve L foot PF/DF by 5 degrees both directions to improve mobility and function [] Met  [] Not Met  [] Progressing 10/2/2024     Patient to improve bilateral great toe extension to 50 degrees or greater to impact mobility and gait pattern  [] Met  [] Not Met  [] Progressing 10/2/2024     Patient to improve foot FOTO to 82 /100 to display improved activity participation 77/100 (same as evaluation) [] Met  [x] Not Met  []  Progressing 10/2/2024        Long Term Goal Status Est. Met   Patient will display independent and correct performance of advanced home exercise program without cueing to impact knowledge of condition [x] Met  [] Not Met  [] Progressing 10/2/2024  11/20/24   Patient to improve L foot PF by 10 degrees both directions to improve mobility and function [] Met  [] Not Met  [] Progressing 10/2/2024     Patient to report improved standing and walking tolerance without increase foot and leg pain  [x] Met  [] Not Met  [] Progressing 10/2/2024  11/20/24   Patient to improve foot FOTO to 87 /100 to display improved activity participation [] Met  [] Not Met  [x] Progressing 10/2/2024     Patient will report confidence in managing condition upon discharge from Physical Therapy. [] Met  [] Not Met  [x] Progressing 10/2/2024        Plan   Plan of care Certification: 10/2/2024 to 12/11/24.     Outpatient Physical Therapy 1-2 times weekly for 10 weeks to include the following interventions: Gait Training, Manual Therapy, Moist Heat/ Ice, Neuromuscular Re-ed, Patient Education, Self Care, Therapeutic Activities, and Therapeutic Exercise , Electrical Stimulation (IFC, Russian), IASTM, STM, Cupping, Blood Flow Restriction as appropriate.    Brittany Curran PT, DPT, SCS, CSCS  Board Certified Sports Clinical Specialist   Certified Dry Needling Provider  11/27/2024

## 2024-11-30 ENCOUNTER — EXTERNAL CHRONIC CARE MANAGEMENT (OUTPATIENT)
Dept: PRIMARY CARE CLINIC | Facility: CLINIC | Age: 54
End: 2024-11-30
Payer: MEDICARE

## 2024-11-30 PROCEDURE — G0511 CCM/BHI BY RHC/FQHC 20MIN MO: HCPCS | Mod: ,,, | Performed by: FAMILY MEDICINE

## 2024-12-02 ENCOUNTER — OFFICE VISIT (OUTPATIENT)
Dept: PAIN MEDICINE | Facility: CLINIC | Age: 54
End: 2024-12-02
Payer: MEDICARE

## 2024-12-02 VITALS
HEIGHT: 69 IN | SYSTOLIC BLOOD PRESSURE: 162 MMHG | WEIGHT: 265 LBS | HEART RATE: 75 BPM | DIASTOLIC BLOOD PRESSURE: 75 MMHG | BODY MASS INDEX: 39.25 KG/M2

## 2024-12-02 DIAGNOSIS — M54.12 CERVICAL RADICULOPATHY: Chronic | ICD-10-CM

## 2024-12-02 DIAGNOSIS — M47.817 LUMBOSACRAL SPONDYLOSIS WITHOUT MYELOPATHY: Chronic | ICD-10-CM

## 2024-12-02 DIAGNOSIS — M05.79 RHEUMATOID ARTHRITIS INVOLVING MULTIPLE SITES WITH POSITIVE RHEUMATOID FACTOR: Primary | Chronic | ICD-10-CM

## 2024-12-02 PROCEDURE — 99214 OFFICE O/P EST MOD 30 MIN: CPT | Mod: S$PBB,,, | Performed by: PHYSICIAN ASSISTANT

## 2024-12-02 PROCEDURE — 99214 OFFICE O/P EST MOD 30 MIN: CPT | Mod: PBBFAC | Performed by: PHYSICIAN ASSISTANT

## 2024-12-02 PROCEDURE — 3077F SYST BP >= 140 MM HG: CPT | Mod: CPTII,,, | Performed by: PHYSICIAN ASSISTANT

## 2024-12-02 PROCEDURE — 3044F HG A1C LEVEL LT 7.0%: CPT | Mod: CPTII,,, | Performed by: PHYSICIAN ASSISTANT

## 2024-12-02 PROCEDURE — 3008F BODY MASS INDEX DOCD: CPT | Mod: CPTII,,, | Performed by: PHYSICIAN ASSISTANT

## 2024-12-02 PROCEDURE — 1159F MED LIST DOCD IN RCRD: CPT | Mod: CPTII,,, | Performed by: PHYSICIAN ASSISTANT

## 2024-12-02 PROCEDURE — 3078F DIAST BP <80 MM HG: CPT | Mod: CPTII,,, | Performed by: PHYSICIAN ASSISTANT

## 2024-12-02 PROCEDURE — 99999 PR PBB SHADOW E&M-EST. PATIENT-LVL IV: CPT | Mod: PBBFAC,,, | Performed by: PHYSICIAN ASSISTANT

## 2024-12-02 RX ORDER — HYDROCODONE BITARTRATE AND ACETAMINOPHEN 10; 325 MG/1; MG/1
1 TABLET ORAL EVERY 12 HOURS PRN
Qty: 60 TABLET | Refills: 0 | Status: SHIPPED | OUTPATIENT
Start: 2025-01-07 | End: 2025-02-06

## 2024-12-02 RX ORDER — HYDROCODONE BITARTRATE AND ACETAMINOPHEN 10; 325 MG/1; MG/1
1 TABLET ORAL EVERY 12 HOURS PRN
Qty: 60 TABLET | Refills: 0 | Status: SHIPPED | OUTPATIENT
Start: 2024-12-07 | End: 2025-01-06

## 2024-12-03 ENCOUNTER — TELEPHONE (OUTPATIENT)
Dept: GASTROENTEROLOGY | Facility: HOSPITAL | Age: 54
End: 2024-12-03
Payer: MEDICARE

## 2024-12-03 DIAGNOSIS — Z12.11 SCREENING FOR COLON CANCER: Primary | ICD-10-CM

## 2024-12-03 RX ORDER — POLYETHYLENE GLYCOL 3350, SODIUM SULFATE ANHYDROUS, SODIUM BICARBONATE, SODIUM CHLORIDE, POTASSIUM CHLORIDE 236; 22.74; 6.74; 5.86; 2.97 G/4L; G/4L; G/4L; G/4L; G/4L
4 POWDER, FOR SOLUTION ORAL ONCE
Qty: 4000 ML | Refills: 0 | Status: SHIPPED | OUTPATIENT
Start: 2024-12-03 | End: 2024-12-03

## 2024-12-04 ENCOUNTER — CLINICAL SUPPORT (OUTPATIENT)
Dept: REHABILITATION | Facility: HOSPITAL | Age: 54
End: 2024-12-04
Payer: COMMERCIAL

## 2024-12-04 DIAGNOSIS — Z78.9 IMPAIRED MOBILITY AND ACTIVITIES OF DAILY LIVING: Primary | Chronic | ICD-10-CM

## 2024-12-04 DIAGNOSIS — Z74.09 IMPAIRED MOBILITY AND ACTIVITIES OF DAILY LIVING: Primary | Chronic | ICD-10-CM

## 2024-12-04 PROCEDURE — 97112 NEUROMUSCULAR REEDUCATION: CPT | Mod: PN | Performed by: GENERAL ACUTE CARE HOSPITAL

## 2024-12-04 PROCEDURE — 97014 ELECTRIC STIMULATION THERAPY: CPT | Mod: PN | Performed by: GENERAL ACUTE CARE HOSPITAL

## 2024-12-04 PROCEDURE — 97140 MANUAL THERAPY 1/> REGIONS: CPT | Mod: PN | Performed by: GENERAL ACUTE CARE HOSPITAL

## 2024-12-04 PROCEDURE — 20560 NDL INSJ W/O NJX 1 OR 2 MUSC: CPT | Mod: PN,CG | Performed by: GENERAL ACUTE CARE HOSPITAL
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
## 2024-12-09 ENCOUNTER — TELEPHONE (OUTPATIENT)
Dept: PAIN MEDICINE | Facility: CLINIC | Age: 54
End: 2024-12-09
Payer: MEDICARE

## 2024-12-09 NOTE — TELEPHONE ENCOUNTER
----- Message from Bettina sent at 12/9/2024  8:21 AM CST -----  Pt stated [facility] told her the date for Norco says 12/07/25 and should be 12/07/24. Needs to be changed per pharmacy.  Who Called: Natali Claire    Caller is requesting assistance/information from provider's office.    pharmacy into here including location and phone number:         Patient's Preferred Phone Number on File: [phone]   Best Call Back Number, if different:  Additional Information:    IVÁN CHERRY   12/09/2024   5:55 PM   Attempted to call no answer, unable to leave message.

## 2024-12-10 NOTE — TELEPHONE ENCOUNTER
----- Message from Bettina sent at 12/9/2024  8:21 AM CST -----  Pt stated [facility] told her the date for Norco says 12/07/25 and should be 12/07/24. Needs to be changed per pharmacy.  Who Called: Natali Claire    Caller is requesting assistance/information from provider's office.    pharmacy into here including location and phone number:         Patient's Preferred Phone Number on File: [phone]   Best Call Back Number, if different:  Additional Information:

## 2024-12-11 ENCOUNTER — CLINICAL SUPPORT (OUTPATIENT)
Dept: REHABILITATION | Facility: HOSPITAL | Age: 54
End: 2024-12-11
Payer: COMMERCIAL

## 2024-12-11 ENCOUNTER — OFFICE VISIT (OUTPATIENT)
Dept: PRIMARY CARE CLINIC | Facility: CLINIC | Age: 54
End: 2024-12-11
Payer: MEDICARE

## 2024-12-11 VITALS
BODY MASS INDEX: 39.25 KG/M2 | DIASTOLIC BLOOD PRESSURE: 80 MMHG | HEIGHT: 69 IN | RESPIRATION RATE: 20 BRPM | SYSTOLIC BLOOD PRESSURE: 136 MMHG | TEMPERATURE: 98 F | HEART RATE: 70 BPM | WEIGHT: 265 LBS | OXYGEN SATURATION: 97 %

## 2024-12-11 DIAGNOSIS — E11.9 TYPE 2 DIABETES MELLITUS WITHOUT COMPLICATION, WITHOUT LONG-TERM CURRENT USE OF INSULIN: ICD-10-CM

## 2024-12-11 DIAGNOSIS — Z74.09 IMPAIRED MOBILITY AND ACTIVITIES OF DAILY LIVING: Primary | Chronic | ICD-10-CM

## 2024-12-11 DIAGNOSIS — I10 PRIMARY HYPERTENSION: ICD-10-CM

## 2024-12-11 DIAGNOSIS — J02.9 SORE THROAT: ICD-10-CM

## 2024-12-11 DIAGNOSIS — J01.10 ACUTE NON-RECURRENT FRONTAL SINUSITIS: Primary | ICD-10-CM

## 2024-12-11 DIAGNOSIS — Z78.9 IMPAIRED MOBILITY AND ACTIVITIES OF DAILY LIVING: Primary | Chronic | ICD-10-CM

## 2024-12-11 LAB
CTP QC/QA: YES
MOLECULAR STREP A: NEGATIVE

## 2024-12-11 PROCEDURE — 3075F SYST BP GE 130 - 139MM HG: CPT | Mod: ,,, | Performed by: NURSE PRACTITIONER

## 2024-12-11 PROCEDURE — 97112 NEUROMUSCULAR REEDUCATION: CPT | Mod: PN | Performed by: GENERAL ACUTE CARE HOSPITAL

## 2024-12-11 PROCEDURE — 97140 MANUAL THERAPY 1/> REGIONS: CPT | Mod: PN | Performed by: GENERAL ACUTE CARE HOSPITAL

## 2024-12-11 PROCEDURE — 97110 THERAPEUTIC EXERCISES: CPT | Mod: PN | Performed by: GENERAL ACUTE CARE HOSPITAL

## 2024-12-11 PROCEDURE — 3079F DIAST BP 80-89 MM HG: CPT | Mod: ,,, | Performed by: NURSE PRACTITIONER

## 2024-12-11 PROCEDURE — 2023F DILAT RTA XM W/O RTNOPTHY: CPT | Mod: ,,, | Performed by: NURSE PRACTITIONER

## 2024-12-11 PROCEDURE — 96372 THER/PROPH/DIAG INJ SC/IM: CPT | Mod: ,,, | Performed by: NURSE PRACTITIONER

## 2024-12-11 PROCEDURE — 99213 OFFICE O/P EST LOW 20 MIN: CPT | Mod: 25,,, | Performed by: NURSE PRACTITIONER

## 2024-12-11 PROCEDURE — 3044F HG A1C LEVEL LT 7.0%: CPT | Mod: ,,, | Performed by: NURSE PRACTITIONER

## 2024-12-11 PROCEDURE — 97530 THERAPEUTIC ACTIVITIES: CPT | Mod: PN | Performed by: GENERAL ACUTE CARE HOSPITAL

## 2024-12-11 PROCEDURE — 97014 ELECTRIC STIMULATION THERAPY: CPT | Mod: PN | Performed by: GENERAL ACUTE CARE HOSPITAL

## 2024-12-11 PROCEDURE — 3008F BODY MASS INDEX DOCD: CPT | Mod: ,,, | Performed by: NURSE PRACTITIONER

## 2024-12-11 PROCEDURE — 87651 STREP A DNA AMP PROBE: CPT | Mod: QW,,, | Performed by: NURSE PRACTITIONER

## 2024-12-11 PROCEDURE — 1160F RVW MEDS BY RX/DR IN RCRD: CPT | Mod: ,,, | Performed by: NURSE PRACTITIONER

## 2024-12-11 PROCEDURE — 1159F MED LIST DOCD IN RCRD: CPT | Mod: ,,, | Performed by: NURSE PRACTITIONER

## 2024-12-11 PROCEDURE — 20560 NDL INSJ W/O NJX 1 OR 2 MUSC: CPT | Mod: PN,CG | Performed by: GENERAL ACUTE CARE HOSPITAL

## 2024-12-11 RX ORDER — CEFTRIAXONE 1 G/1
1 INJECTION, POWDER, FOR SOLUTION INTRAMUSCULAR; INTRAVENOUS
Status: COMPLETED | OUTPATIENT
Start: 2024-12-11 | End: 2024-12-11

## 2024-12-11 RX ORDER — KETOROLAC TROMETHAMINE 30 MG/ML
30 INJECTION, SOLUTION INTRAMUSCULAR; INTRAVENOUS
Status: COMPLETED | OUTPATIENT
Start: 2024-12-11 | End: 2024-12-11

## 2024-12-11 RX ORDER — AMOXICILLIN 500 MG/1
500 CAPSULE ORAL EVERY 12 HOURS
Qty: 20 CAPSULE | Refills: 0 | Status: SHIPPED | OUTPATIENT
Start: 2024-12-11 | End: 2024-12-21

## 2024-12-11 RX ORDER — FLUTICASONE PROPIONATE 50 MCG
1 SPRAY, SUSPENSION (ML) NASAL DAILY
Qty: 18.2 ML | Refills: 0 | Status: SHIPPED | OUTPATIENT
Start: 2024-12-11

## 2024-12-11 RX ADMIN — KETOROLAC TROMETHAMINE 30 MG: 30 INJECTION, SOLUTION INTRAMUSCULAR; INTRAVENOUS at 11:12

## 2024-12-11 RX ADMIN — CEFTRIAXONE 1 G: 1 INJECTION, POWDER, FOR SOLUTION INTRAMUSCULAR; INTRAVENOUS at 11:12

## 2024-12-11 NOTE — PATIENT INSTRUCTIONS
Patient Education       Sinusitis in Adults   The Basics   Written by the doctors and editors at [facility]   What is sinusitis? -- Sinusitis is a condition that can cause a stuffy nose, pain in the face, and discharge (mucus) from the nose. The sinuses are hollow areas in the bones of the face (figure 1). They have a thin lining that normally makes a small amount of mucus. When this lining gets irritated or infected, it swells and makes extra mucus. This causes symptoms.  Sinusitis can occur when a person gets sick with a cold. The germs causing the cold can also infect the sinuses. Many times, a person feels like their cold is getting better. But then they get sinusitis and begin to feel sick again.  What are the symptoms of sinusitis? -- Common symptoms of sinusitis include:  Stuffy or blocked nose  Thick white, yellow, or green discharge from the nose  Pain in the teeth  Pain or pressure in the face - This often feels worse when a person bends forward.  People with sinusitis can also have other symptoms that include:  Fever  Cough  Trouble smelling  Ear pressure or fullness  Headache  Bad breath  Feeling tired  Most of the time, symptoms start to improve in 7 to 10 days.  Should I see a doctor or nurse? -- See your doctor or nurse if your symptoms last more than 10 days, or if your symptoms first get better but then get worse.  Rarely, sinusitis can lead to serious problems. See your doctor or nurse right away (do not wait 10 days) if you have:  Fever higher than 102°F (38.9°C)  Sudden and severe pain in the face and head  Trouble seeing or seeing double  Trouble thinking clearly  Swelling or redness around one or both eyes  A stiff neck  Is there anything I can do on my own to feel better? -- Yes. To reduce your symptoms, you can:  Take an over-the-counter pain reliever to reduce the pain  Rinse your nose and sinuses with salt water a few times a day - Ask your doctor or nurse about the best way to do this.  Your  "doctor might also prescribe a steroid nose spray to reduce the swelling in your nose. (These kinds of steroid nose sprays are safe to take, and do not contain the same steroids that some athletes take illegally.)  How is sinusitis treated? -- Most of the time, sinusitis does not need to be treated with antibiotic medicines. This is because most sinusitis is caused by viruses, not bacteria, and antibiotics do not kill viruses. In fact, even sinusitis caused by bacteria will usually get better on its own without antibiotics.   Some people with sinusitis do need treatment with antibiotics. If your symptoms have not improved after 10 days, ask your doctor if you should take antibiotics. Your doctor might recommend that you wait 1 more week to see if your symptoms improve. But if you have symptoms such as a fever or a lot of pain, they might prescribe antibiotics. It is important to follow your doctor's instructions about taking your antibiotics.  What if my symptoms do not get better? -- If your symptoms do not get better, talk with your doctor or nurse. They might order tests to figure out why you still have symptoms. These can include:  CT scan or other imaging tests - Imaging tests create pictures of the inside of the body.  A test to look inside the sinuses - For this test, a doctor puts a thin tube with a camera on the end into the nose and up into the sinuses.  Some people get a lot of sinus infections or have symptoms that last at least 3 months. These people can have a different type of sinusitis called "chronic sinusitis." Chronic sinusitis can be caused by different things. For example, some people have growths inside their nose or sinuses that are called "polyps." Other people have allergies that cause their symptoms.  Chronic sinusitis can be treated in different ways. If you have chronic sinusitis, talk with your doctor about which treatments are right for you.  All topics are updated as new evidence " becomes available and our peer review process is complete.  This topic retrieved from [employer] on: Sep 21, 2021.  Topic 57104 Version 17.0  Release: 29.4.2 - C29.263  © 2021 UpToDate, Inc. and/or its affiliates. All rights reserved.  figure 1: Sinuses of the face     This drawing shows the sinuses of the face, from the side and front views.  Graphic 527574 Version 1.0    Consumer Information Use and Disclaimer   This information is not specific medical advice and does not replace information you receive from your health care provider. This is only a brief summary of general information. It does NOT include all information about conditions, illnesses, injuries, tests, procedures, treatments, therapies, discharge instructions or life-style choices that may apply to you. You must talk with your health care provider for complete information about your health and treatment options. This information should not be used to decide whether or not to accept your health care provider's advice, instructions or recommendations. Only your health care provider has the knowledge and training to provide advice that is right for you. The use of this information is governed by the [employer] End User License Agreement, available at [email or web address]/dhruv.The use of [employer] content is governed by the [employer] Terms of Use. ©2021 UpToDate, Inc. All rights reserved.  Copyright   © 2021 UpToDate, Inc. and/or its affiliates. All rights reserved.

## 2024-12-11 NOTE — PROGRESS NOTES
[facility]  [address] [place] [address]  Ph: [phone] Griselda Rogers DNP, FNP-C  [facility]  Primary Care       PATIENT NAME: Natali Claire   : 1970    AGE: 54 y.o. DATE: 2024    MRN: [id number]        Reason for Visit / Chief Complaint:  Nasal Congestion, Sore Throat, and Diabetes (Fasting blood sugar 101/)     Subjective:     HPI: Patient complains of sore throat; states her tonsils are swollen; states she has runny nose, post nasal drip, headache; no body aches or chills; no fever. Has nasal congestion. Denies any nausea, vomiting, or diarrhea. Symptoms x 2 days. Patient states she has not taken any medication today for pain.     Sore Throat   Associated symptoms include congestion, coughing and headaches. Pertinent negatives include no abdominal pain, diarrhea, shortness of breath or vomiting.   Diabetes  Hypoglycemia symptoms include headaches. Pertinent negatives for diabetes include no chest pain and no fatigue.          Review of Systems: Review of Systems   Constitutional:  Negative for chills, fatigue and fever.   HENT:  Positive for congestion, postnasal drip, rhinorrhea and sore throat.    Respiratory:  Positive for cough. Negative for chest tightness and shortness of breath.    Cardiovascular:  Negative for chest pain.   Gastrointestinal:  Negative for abdominal pain, constipation, diarrhea, nausea and vomiting.   Genitourinary:  Negative for dysuria.   Musculoskeletal:  Negative for gait problem.   Skin:  Negative for rash.   Neurological:  Positive for headaches.          Review of patient's allergies indicates:  No Known Allergies     Med List:  Current Outpatient Medications on File Prior to Visit   Medication Sig Dispense Refill    atorvastatin (LIPITOR) 10 MG tablet Take 1 tablet (10 mg total) by mouth once daily. 90 tablet 3    cetirizine (ZYRTEC) 10 MG tablet Take by mouth.      cyclobenzaprine (FLEXERIL) 10 MG tablet Take 1  tablet (10 mg total) by mouth 3 (three) times daily as needed for Muscle spasms. 90 tablet 2    ENBREL SURECLICK 50 mg/mL (1 mL) PnIj 50 mg every 7 days.      ergocalciferol (ERGOCALCIFEROL) 50,000 unit Cap Take 1 capsule by mouth.      esomeprazole (NEXIUM) 40 MG capsule Take 1 capsule (40 mg total) by mouth before breakfast. 90 capsule 1    ferrous sulfate 325 (65 FE) MG EC tablet Take 1 tablet (325 mg total) by mouth once daily. 90 tablet 3    folic acid (FOLVITE) 1 MG tablet Take 1 tablet (1 mg total) by mouth once daily. 90 tablet 3    glimepiride (AMARYL) 4 MG tablet Take 1 tablet (4 mg total) by mouth before breakfast. 90 tablet 3    hydroCHLOROthiazide (HYDRODIURIL) 25 MG tablet Take 1 tablet (25 mg total) by mouth every morning. 90 tablet 3    [START ON 1/7/2025] HYDROcodone-acetaminophen (NORCO)  mg per tablet Take 1 tablet by mouth every 12 (twelve) hours as needed for Pain. 60 tablet 0    HYDROcodone-acetaminophen (NORCO)  mg per tablet Take 1 tablet by mouth every 12 (twelve) hours as needed for Pain. 60 tablet 0    loratadine (ALLERGY RELIEF, LORATADINE,) 10 mg tablet Take 1 tablet (10 mg total) by mouth once daily. 90 tablet 3    metFORMIN (GLUCOPHAGE) 500 MG tablet Take 1 tablet (500 mg total) by mouth 2 (two) times daily with meals. 180 tablet 3    methotrexate 2.5 MG Tab 2.5 mg. Patient takes 10 tablet once a week      metoprolol tartrate (LOPRESSOR) 50 MG tablet Take 1 tablet (50 mg total) by mouth 2 (two) times daily. 180 tablet 3    montelukast (SINGULAIR) 10 mg tablet Take 1 tablet (10 mg total) by mouth every evening. 90 tablet 3    nabumetone (RELAFEN) 750 MG tablet TAKE 1 TABLET (750 MG TOTAL) BY MOUTH 2 (TWO) TIMES DAILY. WITH FOOD FOR PAIN. 180 tablet 3    potassium chloride SA (K-DUR,KLOR-CON) 20 MEQ tablet Take 1 tablet (20 mEq total) by mouth 2 (two) times daily. 180 tablet 3    tirzepatide (MOUNJARO) 7.5 mg/0.5 mL PnIj Inject 7.5 mg into the skin every 7 days. 4 Pen 5     tiZANidine (ZANAFLEX) 4 MG tablet TAKE 1 TABLET BY MOUTH EVERY 8 HOURS IF NEEDED FOR MUSCLE SPASMS       No current facility-administered medications on file prior to visit.       Medical/Social/Family History:  Past Medical History:   Diagnosis Date    Anxiety     Arthritis     Chronic low back pain     Chronic pain syndrome     Degenerative joint disease involving multiple joints     Diabetes mellitus, type 2     GERD (gastroesophageal reflux disease)     Hyperlipidemia     Hypertension     Lumbar radiculopathy     Lumbosacral spondylosis     Multiple joint pain     Onychomycosis     Osteoarthritis     Other long term (current) drug therapy     Rheumatoid arthritis       Social History     Tobacco Use   Smoking Status Never    Passive exposure: Never   Smokeless Tobacco Never      Social History     Substance and Sexual Activity   Alcohol Use Not Currently       Family History   Problem Relation Name Age of Onset    Hypertension Mother      Diabetes Mother      Hypertension Father      Heart disease Father      Diabetes Sister      Breast cancer Paternal Cousin        Past Surgical History:   Procedure Laterality Date    BREAST SURGERY      CHOLECYSTECTOMY      TUBAL LIGATION        Immunization History   Administered Date(s) Administered    COVID-19, MRNA, LN-S, PF (MODERNA FULL 0.5 ML DOSE) 03/24/2021, 04/21/2021, 11/07/2022    DTP 1970, 01/14/1970, 04/15/1970, 08/12/1971, 08/28/1974    IPV 1970, 01/14/1970, 08/12/1971, 08/28/1974, 08/23/1978    Influenza - Quadrivalent - PF *Preferred* (6 months and older) 12/01/2021    Influenza - Trivalent - Fluarix, Flulaval, Fluzone, Afluria - PF 10/10/2024    MMR 01/17/1971    Measles 08/21/1971    Pneumococcal Conjugate - 13 Valent 11/15/2017    Pneumococcal Conjugate - 20 Valent 10/10/2024    Rubella 01/17/1971    Zoster Recombinant 09/18/2024          Objective:      Vitals:    12/11/24 1016   BP: 136/80   BP Location: Right arm   Patient Position: Sitting  "  Pulse: 70   Resp: 20   Temp: 97.7 °F (36.5 °C)   TempSrc: Oral   SpO2: 97%   Weight: 120.2 kg (265 lb)   Height: 5' 9" (1.753 m)     Body mass index is 39.13 kg/m².     Physical Exam: Physical Exam  Vitals and nursing note reviewed.   Constitutional:       General: She is not in acute distress.     Appearance: Normal appearance. She is not ill-appearing, toxic-appearing or diaphoretic.   HENT:      Head: Normocephalic.      Right Ear: Tympanic membrane, ear canal and external ear normal.      Left Ear: Tympanic membrane, ear canal and external ear normal.      Nose:      Right Sinus: Maxillary sinus tenderness and frontal sinus tenderness present.      Left Sinus: Maxillary sinus tenderness and frontal sinus tenderness present.      Mouth/Throat:      Mouth: Mucous membranes are moist.      Pharynx: No posterior oropharyngeal erythema.   Eyes:      Extraocular Movements: Extraocular movements intact.      Conjunctiva/sclera: Conjunctivae normal.      Pupils: Pupils are equal, round, and reactive to light.   Cardiovascular:      Rate and Rhythm: Normal rate and regular rhythm.      Heart sounds: Normal heart sounds.   Pulmonary:      Effort: Pulmonary effort is normal. No respiratory distress.      Breath sounds: Normal breath sounds. No stridor. No wheezing, rhonchi or rales.   Abdominal:      Palpations: Abdomen is soft.   Musculoskeletal:         General: Normal range of motion.      Cervical back: Normal range of motion and neck supple.   Skin:     General: Skin is warm and dry.   Neurological:      General: No focal deficit present.      Mental Status: She is alert and oriented to person, place, and time.      Gait: Gait normal.   Psychiatric:         Mood and Affect: Mood normal.         Behavior: Behavior normal.                Assessment:          ICD-10-CM ICD-9-CM   1. Acute non-recurrent frontal sinusitis  J01.10 461.1   2. Sore throat  J02.9 462   3. Type 2 diabetes mellitus without complication, " without long-term current use of insulin  E11.9 250.00   4. Primary hypertension  I10 401.9        Plan:       Acute non-recurrent frontal sinusitis  -     cefTRIAXone injection 1 g  -     amoxicillin (AMOXIL) 500 MG capsule; Take 1 capsule (500 mg total) by mouth every 12 (twelve) hours. for 10 days  Dispense: 20 capsule; Refill: 0  -     fluticasone propionate (FLONASE) 50 mcg/actuation nasal spray; 1 spray (50 mcg total) by Each Nostril route once daily.  Dispense: 18.2 mL; Refill: 0    Sore throat  -     POCT Strep A, Molecular  -     ketorolac injection 30 mg    Type 2 diabetes mellitus without complication, without long-term current use of insulin  -     POCT glucose    Primary hypertension          New & refilled meds:  Requested Prescriptions     Signed Prescriptions Disp Refills    amoxicillin (AMOXIL) 500 MG capsule 20 capsule 0     Sig: Take 1 capsule (500 mg total) by mouth every 12 (twelve) hours. for 10 days    fluticasone propionate (FLONASE) 50 mcg/actuation nasal spray 18.2 mL 0     Si spray (50 mcg total) by Each Nostril route once daily.       Follow up if symptoms worsen or fail to improve.     Patient Instructions   Patient Education       Sinusitis in Adults   The Basics   Written by the doctors and editors at [facility]   What is sinusitis? -- Sinusitis is a condition that can cause a stuffy nose, pain in the face, and discharge (mucus) from the nose. The sinuses are hollow areas in the bones of the face (figure 1). They have a thin lining that normally makes a small amount of mucus. When this lining gets irritated or infected, it swells and makes extra mucus. This causes symptoms.  Sinusitis can occur when a person gets sick with a cold. The germs causing the cold can also infect the sinuses. Many times, a person feels like their cold is getting better. But then they get sinusitis and begin to feel sick again.  What are the symptoms of sinusitis? -- Common symptoms of sinusitis  include:  Stuffy or blocked nose  Thick white, yellow, or green discharge from the nose  Pain in the teeth  Pain or pressure in the face - This often feels worse when a person bends forward.  People with sinusitis can also have other symptoms that include:  Fever  Cough  Trouble smelling  Ear pressure or fullness  Headache  Bad breath  Feeling tired  Most of the time, symptoms start to improve in 7 to 10 days.  Should I see a doctor or nurse? -- See your doctor or nurse if your symptoms last more than 10 days, or if your symptoms first get better but then get worse.  Rarely, sinusitis can lead to serious problems. See your doctor or nurse right away (do not wait 10 days) if you have:  Fever higher than 102°F (38.9°C)  Sudden and severe pain in the face and head  Trouble seeing or seeing double  Trouble thinking clearly  Swelling or redness around one or both eyes  A stiff neck  Is there anything I can do on my own to feel better? -- Yes. To reduce your symptoms, you can:  Take an over-the-counter pain reliever to reduce the pain  Rinse your nose and sinuses with salt water a few times a day - Ask your doctor or nurse about the best way to do this.  Your doctor might also prescribe a steroid nose spray to reduce the swelling in your nose. (These kinds of steroid nose sprays are safe to take, and do not contain the same steroids that some athletes take illegally.)  How is sinusitis treated? -- Most of the time, sinusitis does not need to be treated with antibiotic medicines. This is because most sinusitis is caused by viruses, not bacteria, and antibiotics do not kill viruses. In fact, even sinusitis caused by bacteria will usually get better on its own without antibiotics.   Some people with sinusitis do need treatment with antibiotics. If your symptoms have not improved after 10 days, ask your doctor if you should take antibiotics. Your doctor might recommend that you wait 1 more week to see if your symptoms  "improve. But if you have symptoms such as a fever or a lot of pain, they might prescribe antibiotics. It is important to follow your doctor's instructions about taking your antibiotics.  What if my symptoms do not get better? -- If your symptoms do not get better, talk with your doctor or nurse. They might order tests to figure out why you still have symptoms. These can include:  CT scan or other imaging tests - Imaging tests create pictures of the inside of the body.  A test to look inside the sinuses - For this test, a doctor puts a thin tube with a camera on the end into the nose and up into the sinuses.  Some people get a lot of sinus infections or have symptoms that last at least 3 months. These people can have a different type of sinusitis called "chronic sinusitis." Chronic sinusitis can be caused by different things. For example, some people have growths inside their nose or sinuses that are called "polyps." Other people have allergies that cause their symptoms.  Chronic sinusitis can be treated in different ways. If you have chronic sinusitis, talk with your doctor about which treatments are right for you.  All topics are updated as new evidence becomes available and our peer review process is complete.  This topic retrieved from [employer] on: Sep 21, 2021.  Topic 01908 Version 17.0  Release: 29.4.2 - C29.263  © 2021 UpToDate, Inc. and/or its affiliates. All rights reserved.  figure 1: Sinuses of the face     This drawing shows the sinuses of the face, from the side and front views.  Graphic 184747 Version 1.0    Consumer Information Use and Disclaimer   This information is not specific medical advice and does not replace information you receive from your health care provider. This is only a brief summary of general information. It does NOT include all information about conditions, illnesses, injuries, tests, procedures, treatments, therapies, discharge instructions or life-style choices that may apply to " you. You must talk with your health care provider for complete information about your health and treatment options. This information should not be used to decide whether or not to accept your health care provider's advice, instructions or recommendations. Only your health care provider has the knowledge and training to provide advice that is right for you. The use of this information is governed by the [employer] End User License Agreement, available at [email or web address]/[email or web address].The use of [employer] content is governed by the [employer] Terms of Use. ©2021 UpToDate, Inc. All rights reserved.  Copyright   © 2021 Up[employer]Date, Inc. and/or its affiliates. All rights reserved.           Signature: Griselda Rogers DNP, FNP-C

## 2024-12-11 NOTE — PROGRESS NOTES
"[facility]   Physical Therapy Treatment Note   Name: Matilda Decker  Clinic Number: [id number]    Therapy Diagnosis:   Encounter Diagnosis   Name Primary?    Impaired mobility and activities of daily living Yes     Physician: Ramo Bashir MD  Physician Orders: PT Eval and Treat   Medical Diagnosis from Referral: Plantar Fasciitis  Evaluation Date: 10/2/2024  Authorization Period Expiration: 12/31/24  Plan of Care Expiration: 12/11/24 (10 weeks)  Progress Note Due: 12/20/24  Visit # / Visits authorized: treatments: 7 (1/1 Evaluation)  FOTO: 3/3 - foot  77/100    Visit Date: 12/11/2024  Time In: 0805  Time Out: 0900  Total Appointment Time: 55 minutes  PTA Visit #: 0/5     Treatment No Show #: 0  Subjective   Pt reports: Patient returns outpatient physical therapy, reporting improvements in foot and ankle symptoms as noted by increase standing tolerance at work    She was compliant with home exercise program.  Response to previous treatment: on-going  Functional change: on-going  Pain: 2/10  Location: bilateral feet      Objective      Objective Measures updated at progress report unless specified.     Ankle/Foot  Right  10/2/2024 Right  12/4/24 Left  10/2/2024 Left  12/4/24   Dorsiflexion (20) 20  12 12   Plantarflexion (50) 52  35 50   Inversion 38  46 48   Eversion 36  22 22   Great Toe Ext(70) 45 54 39 40       Treatment   Matilda received following skilled interventions listed below:    CPT Interventions & Parameters  Date: 12/11/24   TE Standing gastroc stretch with big toe stretch "walkouts" bilateral x 3 minutes   FOTO  spike ball rolling (self-STM) bilateral x 3 minutes bilateral   NMR Standing heel raise isometrics on foam 4x15s  Toe crunches   Hip abduction-red theraband x15 R &L on foam  Hip posterolateral hip extension x15 R &L on foam   TA Single leg press (lateral) 1.5B [id number]  Heel raises on shuttle 1.2B 2x15 bilateral    MT   +   UES  +  DN 1-2 Dry Needling: " patient prone lying   Medial Gastroc 40mm x 2 bilateral  Lateral Gastroc 40mm x 2 bilateral  e-stim x 6 minutes bilateral            PLAN Progress single leg exercises and weight bearing tasks       Timed CPT Codes available for [place](15) minutes of Therapeutic Exercise 86565 (TE) to develop: strength, endurance, ROM, flexibility, posture, and core stabilization  1(15) minutes of Neuromuscular Re-education 36551 (NMR) activities to improve: Balance, Coordination, Kinesthetic, Sense, Proprioception, and Posture   1(8) minutes of Therapeutic Activities 54813 (TA) to improve: functional performance, impact activities of daily living  1(10) minutes of Manual Therapy 99406 (MT) techniques to improve: Joint mobilizations, Manual traction, Myofacial release, Soft tissue Mobilization, and Friction Massage   ( ) minutes of Gait Training 45482(GT) to improve: functional mobility, gait mechanics, safety with locomotion     Untimed CPT Codes available for Billing:   (X) Dry Needling 73592 (DN 1-2) to impact muscular pain, tightness, trigger points, and tissue pliability  ( ) Dry Needling 59897 (DN 3+) to impact muscular pain, tightness, trigger points, and tissue pliability  (X) Unattended Electrical Stimulation 37010 (UES) for muscle performance or pain modulation  ( ) Heat/Cold Modalities 37797 (H/C) to impact pain   ( ) Mechanical Traction 09658 (T) to impact pain, tissue pliability, range of motion     *A portion of this treatment session is provided with the assistance of a skilled rehabilitation technician under the supervision of a licensed physical therapist.   *Billing accurately reflects 1:1 treatment time per patient's insurance guidelines.     Patient Education and Home Exercises     Home Exercises Provided and Patient Education Provided   Patient was educated on the role of PT, POC, treatment plan, discharge goals, HEP.  Patient educated on biomechanical justification for therapeutic exercise and importance  of compliance with HEP in order to improve overall impairments and QOL   Patient was educated on all the above exercise prior/during/after for proper posture, positioning, and execution for safe performance with home exercise program.       Written Home Exercises Provided:   yes.   Exercises were reviewed and Matilda was able to demonstrate them prior to the end of the session.    Matilda demonstrated good  understanding of the education provided.   See EMR under Patient Instructions for exercises provided during therapy sessions    Assessment   Due to patient feeling better overall with her back, we were able to continue progression to strengthening exercises specifically in the single leg position, as well as activation of hip abduction, musculature with good return demonstration.    Matilda Is progressing well towards her goals.   Pt prognosis is Good.   Pt will continue to benefit from skilled outpatient physical therapy to address the deficits listed in the problem list box on initial evaluation, provide pt/family education and to maximize pt's level of independence in the home and community environment.   Pt's spiritual, cultural and educational needs considered and pt agreeable to plan of care and goals.  Anticipated barriers to physical therapy: none    Goals:  Short Term Goals Status Est. Met   Patient to be independent with foundational home exercise program performance to impact knowledge of condition  [x] Met  [] Not Met  [] Progressing 10/2/2024  11/20/24   Patient to improve L foot PF/DF by 5 degrees both directions to improve mobility and function [] Met  [] Not Met  [] Progressing 10/2/2024     Patient to improve bilateral great toe extension to 50 degrees or greater to impact mobility and gait pattern  [] Met  [] Not Met  [] Progressing 10/2/2024     Patient to improve foot FOTO to 82 /100 to display improved activity participation 77/100 (same as evaluation) [] Met  [x] Not Met  [] Progressing 10/2/2024         Long Term Goal Status Est. Met   Patient will display independent and correct performance of advanced home exercise program without cueing to impact knowledge of condition [x] Met  [] Not Met  [] Progressing 10/2/2024  11/20/24   Patient to improve L foot PF by 10 degrees both directions to improve mobility and function [] Met  [] Not Met  [] Progressing 10/2/2024     Patient to report improved standing and walking tolerance without increase foot and leg pain  [x] Met  [] Not Met  [] Progressing 10/2/2024  11/20/24   Patient to improve foot FOTO to 87 /100 to display improved activity participation [] Met  [] Not Met  [x] Progressing 10/2/2024     Patient will report confidence in managing condition upon discharge from Physical Therapy. [] Met  [] Not Met  [x] Progressing 10/2/2024        Plan   Plan of care Certification: 10/2/2024 to 12/11/24.     Outpatient Physical Therapy 1-2 times weekly for 10 weeks to include the following interventions: Gait Training, Manual Therapy, Moist Heat/ Ice, Neuromuscular Re-ed, Patient Education, Self Care, Therapeutic Activities, and Therapeutic Exercise , Electrical Stimulation (IFC, Russian), IASTM, STM, Cupping, Blood Flow Restriction as appropriate.    Brittany Curran PT, DPT, SCS, CSCS  Board Certified Sports Clinical Specialist   Certified Dry Needling Provider  12/11/2024

## 2024-12-20 ENCOUNTER — OFFICE VISIT (OUTPATIENT)
Dept: FAMILY MEDICINE | Facility: CLINIC | Age: 54
End: 2024-12-20
Payer: COMMERCIAL

## 2024-12-20 VITALS
HEART RATE: 62 BPM | WEIGHT: 198.19 LBS | HEIGHT: 66 IN | DIASTOLIC BLOOD PRESSURE: 60 MMHG | BODY MASS INDEX: 31.85 KG/M2 | SYSTOLIC BLOOD PRESSURE: 108 MMHG | OXYGEN SATURATION: 99 %

## 2024-12-20 DIAGNOSIS — R09.81 NASAL CONGESTION: ICD-10-CM

## 2024-12-20 DIAGNOSIS — M54.2 NECK PAIN: ICD-10-CM
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
## 2024-12-20 DIAGNOSIS — Z79.899 ENCOUNTER FOR LONG-TERM (CURRENT) USE OF MEDICATIONS: ICD-10-CM

## 2024-12-20 DIAGNOSIS — J22 LRTI (LOWER RESPIRATORY TRACT INFECTION): ICD-10-CM
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
## 2024-12-20 PROCEDURE — 99999 PR PBB SHADOW E&M-EST. PATIENT-LVL IV: CPT | Mod: PBBFAC,,, | Performed by: FAMILY MEDICINE

## 2024-12-20 RX ORDER — FLUTICASONE PROPIONATE 50 MCG
SPRAY, SUSPENSION (ML) NASAL
Qty: 16 G | Refills: 12 | Status: SHIPPED | OUTPATIENT
Start: 2024-12-20
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
## 2024-12-20 NOTE — PATIENT INSTRUCTIONS
Follow up in about 6 months (around 6/20/2025), or if symptoms worsen or fail to improve, for LAB RESULTS, Med refills.     Dear patient,   As a result of recent federal legislation (The Federal Cures Act), you may receive lab or pathology results from your visit in your [facility] account before your physician is able to contact you. Your physician or their representative will relay the results to you with their recommendations at their soonest availability.     If no improvement in symptoms or symptoms worsen, please be advised to call MD, follow-up at clinic and/or go to ER if becomes severe.    Ramo Bashir M.D.        We Offer TELEHEALTH & Same Day Appointments!   Book your Telehealth appointment with me through my nurse or   Clinic appointments on [facility]!    [address] [place] [address]    Office: [phone]   FAX: [phone]    Check out my Facebook Page and Follow Me at: [email or web address]    Check out my website at [employer] by clicking on: [email or web address]    To Schedule appointments online, go to [facility]: [email or web address]

## 2024-12-31 ENCOUNTER — EXTERNAL CHRONIC CARE MANAGEMENT (OUTPATIENT)
Dept: PRIMARY CARE CLINIC | Facility: CLINIC | Age: 54
End: 2024-12-31
Payer: MEDICARE

## 2025-01-02 ENCOUNTER — CLINICAL SUPPORT (OUTPATIENT)
Dept: REHABILITATION | Facility: HOSPITAL | Age: 55
End: 2025-01-02
Payer: COMMERCIAL

## 2025-01-02 ENCOUNTER — TELEPHONE (OUTPATIENT)
Dept: FAMILY MEDICINE | Facility: CLINIC | Age: 55
End: 2025-01-02
Payer: COMMERCIAL

## 2025-01-02 DIAGNOSIS — M54.2 NECK PAIN: ICD-10-CM

## 2025-01-02 PROCEDURE — 97161 PT EVAL LOW COMPLEX 20 MIN: CPT | Mod: PN | Performed by: GENERAL ACUTE CARE HOSPITAL

## 2025-01-02 PROCEDURE — 97112 NEUROMUSCULAR REEDUCATION: CPT | Mod: PN | Performed by: GENERAL ACUTE CARE HOSPITAL

## 2025-01-02 PROCEDURE — 20560 NDL INSJ W/O NJX 1 OR 2 MUSC: CPT | Mod: PN,CG | Performed by: GENERAL ACUTE CARE HOSPITAL

## 2025-01-02 PROCEDURE — 97014 ELECTRIC STIMULATION THERAPY: CPT | Mod: PN | Performed by: GENERAL ACUTE CARE HOSPITAL
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
## 2025-01-02 NOTE — TELEPHONE ENCOUNTER
Pt has been called and notified of the following message and balance and verbalized understanding.

## 2025-01-08 ENCOUNTER — CLINICAL SUPPORT (OUTPATIENT)
Dept: REHABILITATION | Facility: HOSPITAL | Age: 55
End: 2025-01-08
Payer: COMMERCIAL

## 2025-01-08 DIAGNOSIS — Z78.9 IMPAIRED MOBILITY AND ACTIVITIES OF DAILY LIVING: Primary | ICD-10-CM

## 2025-01-08 DIAGNOSIS — Z74.09 IMPAIRED MOBILITY AND ACTIVITIES OF DAILY LIVING: Primary | ICD-10-CM

## 2025-01-08 PROCEDURE — 97014 ELECTRIC STIMULATION THERAPY: CPT | Mod: PN | Performed by: GENERAL ACUTE CARE HOSPITAL

## 2025-01-08 PROCEDURE — 97110 THERAPEUTIC EXERCISES: CPT | Mod: PN | Performed by: GENERAL ACUTE CARE HOSPITAL

## 2025-01-08 PROCEDURE — 20560 NDL INSJ W/O NJX 1 OR 2 MUSC: CPT | Mod: PN,CG | Performed by: GENERAL ACUTE CARE HOSPITAL

## 2025-01-08 PROCEDURE — 97112 NEUROMUSCULAR REEDUCATION: CPT | Mod: PN | Performed by: GENERAL ACUTE CARE HOSPITAL

## 2025-01-08 NOTE — PROGRESS NOTES
[facility]   Physical Therapy Treatment Note    Name: Matilda Decker  Clinic Number: [id number]    Therapy Diagnosis: No diagnosis found.  Physician: Ramo Bashir MD  Physician Orders: PT Eval and Treat   Medical Diagnosis from Referral: Neck Pain  Evaluation Date:2025  Authorization Period Expiration: 25  Plan of Care Expiration: 25  Progress Note Due: 25  Visit # / Visits authorized: 1/10 treatments ( Evaluation)  FOTO: 1/3 - Neck - /100    Visit Date: 2025  Time In: 08  Time Out: 09  Total Appointment Time: 55 minutes  PTA Visit #: 0     Treatment No Show #:  Subjective   Pt reports: Low levels of continued pain in the R posterior shoulder/upper trap/neck. She reports improvements overall with home exercise program performance.     She was compliant with home exercise program.  Response to previous treatment: 1st treatment  session  Functional change: 1st treatment session    Pain: 2/10  Location: right neck / upper trap       Objective      Objective Measures updated at progress report unless specified.     Treatment   Matilda received following skilled interventions listed below:    CPT Interventions & Parameters  Date: 25   TE UBE 3/3 forward/backward   Towel cervical rotation stretch bilateral 3x15s each   NMR Seated chin tucks x15 reps   Rows 2x15 - green theraband  Standing t's green theraband 2x10 reps   Thoracic extension in chair x15 reps    TE Levator scap stretching 2x15s bilateral sitting    MT  +  DN 1-2+  +  UES Dry Needling: prone lying  R upper trap: 40mmx.03mm x 2  L upper trap: 40mm x .03mm x1  Cervical parapsinals: 30mm x .03mm x 2 bilateral  All needles with electrical stimulation                     PLAN        Timed CPT Codes available for [place](8) minutes of Therapeutic Exercise 84414 (TE) to develop: strength, endurance, ROM, flexibility, posture, and core stabilization  2(23) minutes of Neuromuscular  Re-education 82919 (NMR) activities to improve: Balance, Coordination, Kinesthetic, Sense, Proprioception, and Posture   ( ) minutes of Therapeutic Activities 54889 (TA) to improve: functional performance, impact activities of daily living  1(8) minutes of Manual Therapy 07150 (MT) techniques to improve: Joint mobilizations, Manual traction, Myofacial release, Soft tissue Mobilization, and Friction Massage   ( ) minutes of Gait Training 98960(GT) to improve: functional mobility, gait mechanics, safety with locomotion     Untimed CPT Codes available for Billing:   (X) Dry Needling 06047 (DN 1-2) to impact muscular pain, tightness, trigger points, and tissue pliability  ( ) Dry Needling 00573 (DN 3+) to impact muscular pain, tightness, trigger points, and tissue pliability  (X) Unattended Electrical Stimulation 84296 (UES) for muscle performance or pain modulation  ( ) Heat/Cold Modalities 76471 (H/C) to impact pain   ( ) Mechanical Traction 68224 (T) to impact pain, tissue pliability, range of motion        *A portion of this treatment session is provided with the assistance of a skilled rehabilitation technician under the supervision of a licensed physical therapist.   *Billing accurately reflects 1:1 treatment time per patient's insurance guidelines.       Patient Education and Home Exercises     Home Exercises Provided and Patient Education Provided   Patient was educated on the role of PT, POC, treatment plan, discharge goals, HEP.  Patient educated on biomechanical justification for therapeutic exercise and importance of compliance with HEP in order to improve overall impairments and QOL   Patient was educated on all the above exercise prior/during/after for proper posture, positioning, and execution for safe performance with home exercise program.       Written Home Exercises Provided:   yes.   Exercises were reviewed and Matilda was able to demonstrate them prior to the end of the session.    Matilda demonstrated  good  understanding of the education provided.   See EMR under Patient Instructions for exercises provided during therapy sessions    Assessment   Patient is educated on safe and appropriate use of rehabilitation equipment and exercises today in the clinic. Patient is able to provide appropriate and safe return demonstration of use during their first treatment session. Verbal and tactile cueing is provided when appropriate to correct technique. Patient is encouraged to maintain compliance with home exercise program.      Matilda Is progressing well towards her goals.   Pt prognosis is Good.   Pt will continue to benefit from skilled outpatient physical therapy to address the deficits listed in the problem list box on initial evaluation, provide pt/family education and to maximize pt's level of independence in the home and community environment.   Pt's spiritual, cultural and educational needs considered and pt agreeable to plan of care and goals.  Anticipated barriers to physical therapy: none    Goals:  Short Term Goals Status Est. Met   Patient to be independent with foundational home exercise program performance to impact knowledge of condition  [] Met  [] Not Met  [] Progressing 1/2/2025     Patient to improve L cervical sidebend to 34 ° to display improved mobility and upper trap flexibility  [] Met  [] Not Met  [] Progressing 1/2/2025     Patient to improve Neck FOTO to 59/100 to display improved activity participation [] Met  [] Not Met  [] Progressing 1/2/2025        Long Term Goal Status Est. Met   Patient will display independent and correct performance of advanced home exercise program without cueing to impact knowledge of condition [] Met  [] Not Met  [] Progressing 1/2/2025     Patient to improve L cervical sidebend to 38 ° to display improved mobility and upper trap flexibility  [] Met  [] Not Met  [] Progressing 1/2/2025     Patient to improve cervical R rotation to 40 degrees to display improved  rotation and cervical control  [] Met  [] Not Met  [] Progressing 1/2/2025     Patient to improve Neck FOTO to 64/100 to display improved activity participation [] Met  [] Not Met  [] Progressing 1/2/2025     Patient will report confidence in managing condition upon discharge from Physical Therapy. [] Met  [] Not Met  [] Progressing 1/2/2025        Plan   Plan of care Certification: 1/2/2025 to 2/27/25.     Outpatient Physical Therapy 1-2 times weekly for 8 weeks to include the following interventions: Cervical/Lumbar Traction, Gait Training, Manual Therapy, Moist Heat/ Ice, Neuromuscular Re-ed, Patient Education, Self Care, Therapeutic Activities, and Therapeutic Exercise, Electrical Stimulation (IFC, Russian), IASTM, STM, Cupping, Blood Flow Restriction as appropriate.      Brittany Curran PT, DPT, SCS, CSCS  Board Certified Sports Clinical Specialist   Certified Dry Needling Provider  1/8/2025    Disclaimer: This note was prepared using a voice recognition system and is likely to have sound alike errors within the text.

## 2025-01-15 ENCOUNTER — CLINICAL SUPPORT (OUTPATIENT)
Dept: REHABILITATION | Facility: HOSPITAL | Age: 55
End: 2025-01-15
Payer: COMMERCIAL

## 2025-01-15 DIAGNOSIS — Z74.09 IMPAIRED MOBILITY AND ACTIVITIES OF DAILY LIVING: Primary | ICD-10-CM

## 2025-01-15 DIAGNOSIS — Z78.9 IMPAIRED MOBILITY AND ACTIVITIES OF DAILY LIVING: Primary | ICD-10-CM

## 2025-01-15 PROCEDURE — 97014 ELECTRIC STIMULATION THERAPY: CPT | Mod: PN | Performed by: GENERAL ACUTE CARE HOSPITAL

## 2025-01-15 PROCEDURE — 97110 THERAPEUTIC EXERCISES: CPT | Mod: PN | Performed by: GENERAL ACUTE CARE HOSPITAL

## 2025-01-15 PROCEDURE — 97140 MANUAL THERAPY 1/> REGIONS: CPT | Mod: PN | Performed by: GENERAL ACUTE CARE HOSPITAL

## 2025-01-15 PROCEDURE — 20560 NDL INSJ W/O NJX 1 OR 2 MUSC: CPT | Mod: PN,CG | Performed by: GENERAL ACUTE CARE HOSPITAL

## 2025-01-15 PROCEDURE — 97112 NEUROMUSCULAR REEDUCATION: CPT | Mod: PN | Performed by: GENERAL ACUTE CARE HOSPITAL

## 2025-01-15 NOTE — PROGRESS NOTES
"[facility]   Physical Therapy Treatment Note    Name: Matilda Decker  Clinic Number: [id number]    Therapy Diagnosis: No diagnosis found.  Physician: Ramo Bashir MD  Physician Orders: PT Eval and Treat   Medical Diagnosis from Referral: Neck Pain  Evaluation Date:2025  Authorization Period Expiration: 25  Plan of Care Expiration: 25  Progress Note Due: 25  Visit # / Visits authorized: 2/10 treatments ( Evaluation)  FOTO: 1/3 - Neck - /100    Visit Date: 1/15/2025  Time In: 08  Time Out: 09  Total Appointment Time: 55 minutes  PTA Visit #: 0/5     Treatment No Show #:  Subjective   Pt reports: Patient reports "no complaints" today. She states that she was able to rebraid her hair yesterday but was fatigued after keeping arm overhead for approx 1 hour.    She was compliant with home exercise program.  Response to previous treatment:   Functional change:     Pain: 2/10  Location: right neck / upper trap       Objective      Objective Measures updated at progress report unless specified.     Treatment   Matilda received following skilled interventions listed below:    CPT Interventions & Parameters  Date: 1/15/25   TE UBE 4/4 forward/backward - L2  Upper trap stretch 3x15s bilateral  Levator scap stretches 3x15s bilateral   NMR Thoracic extension in chair x15 reps   seated t's green theraband 2x10 reps - 1/2 foam roll at spine  Thoracic open books - red theraband x12 bilateral   Quadruped position with TA contraction and chin tucks x15 reps    MT  +  DN 1-2+  +  UES Dry Needling: prone lying  R upper trap: 40mmx.03mm x 2  L upper trap: 40mm x .03mm x2  All needles with electrical stimulation               PLAN        Timed CPT Codes available for [place](15) minutes of Therapeutic Exercise 54737 (TE) to develop: strength, endurance, ROM, flexibility, posture, and core stabilization  2(23) minutes of Neuromuscular Re-education 66960 (NMR) activities to " improve: Balance, Coordination, Kinesthetic, Sense, Proprioception, and Posture   ( ) minutes of Therapeutic Activities 41690 (TA) to improve: functional performance, impact activities of daily living  1(8) minutes of Manual Therapy 72799 (MT) techniques to improve: Joint mobilizations, Manual traction, Myofacial release, Soft tissue Mobilization, and Friction Massage   ( ) minutes of Gait Training 17901(GT) to improve: functional mobility, gait mechanics, safety with locomotion     Untimed CPT Codes available for Billing:   (X) Dry Needling 84595 (DN 1-2) to impact muscular pain, tightness, trigger points, and tissue pliability  ( ) Dry Needling 37265 (DN 3+) to impact muscular pain, tightness, trigger points, and tissue pliability  (X) Unattended Electrical Stimulation 56544 (UES) for muscle performance or pain modulation  ( ) Heat/Cold Modalities 43268 (H/C) to impact pain   ( ) Mechanical Traction 41338 (T) to impact pain, tissue pliability, range of motion        *A portion of this treatment session is provided with the assistance of a skilled rehabilitation technician under the supervision of a licensed physical therapist.   *Billing accurately reflects 1:1 treatment time per patient's insurance guidelines.     Patient Education and Home Exercises     Home Exercises Provided and Patient Education Provided   Patient was educated on the role of PT, POC, treatment plan, discharge goals, HEP.  Patient educated on biomechanical justification for therapeutic exercise and importance of compliance with HEP in order to improve overall impairments and QOL   Patient was educated on all the above exercise prior/during/after for proper posture, positioning, and execution for safe performance with home exercise program.       Written Home Exercises Provided:   yes.   Exercises were reviewed and Matilda was able to demonstrate them prior to the end of the session.    Matilda demonstrated good  understanding of the education  provided.   See EMR under Patient Instructions for exercises provided during therapy sessions    Assessment   Patient continues to progress well with current program. She requires constant cueing (verbal and tactile) for relaxation of the upper traps with all exercises.    Matilda Is progressing well towards her goals.   Pt prognosis is Good.   Pt will continue to benefit from skilled outpatient physical therapy to address the deficits listed in the problem list box on initial evaluation, provide pt/family education and to maximize pt's level of independence in the home and community environment.   Pt's spiritual, cultural and educational needs considered and pt agreeable to plan of care and goals.  Anticipated barriers to physical therapy: none    Goals:  Short Term Goals Status Est. Met   Patient to be independent with foundational home exercise program performance to impact knowledge of condition  [] Met  [] Not Met  [] Progressing 1/2/2025     Patient to improve L cervical sidebend to 34 ° to display improved mobility and upper trap flexibility  [] Met  [] Not Met  [] Progressing 1/2/2025     Patient to improve Neck FOTO to 59/100 to display improved activity participation [] Met  [] Not Met  [] Progressing 1/2/2025        Long Term Goal Status Est. Met   Patient will display independent and correct performance of advanced home exercise program without cueing to impact knowledge of condition [] Met  [] Not Met  [] Progressing 1/2/2025     Patient to improve L cervical sidebend to 38 ° to display improved mobility and upper trap flexibility  [] Met  [] Not Met  [] Progressing 1/2/2025     Patient to improve cervical R rotation to 40 degrees to display improved rotation and cervical control  [] Met  [] Not Met  [] Progressing 1/2/2025     Patient to improve Neck FOTO to 64/100 to display improved activity participation [] Met  [] Not Met  [] Progressing 1/2/2025     Patient will report confidence in managing  condition upon discharge from Physical Therapy. [] Met  [] Not Met  [] Progressing 1/2/2025        Plan   Plan of care Certification: 1/2/2025 to 2/27/25.     Outpatient Physical Therapy 1-2 times weekly for 8 weeks to include the following interventions: Cervical/Lumbar Traction, Gait Training, Manual Therapy, Moist Heat/ Ice, Neuromuscular Re-ed, Patient Education, Self Care, Therapeutic Activities, and Therapeutic Exercise, Electrical Stimulation (IFC, Russian), IASTM, STM, Cupping, Blood Flow Restriction as appropriate.      Brittany Curran PT, DPT, SCS, CSCS  Board Certified Sports Clinical Specialist   Certified Dry Needling Provider  1/15/2025    Disclaimer: This note was prepared using a voice recognition system and is likely to have sound alike errors within the text.

## 2025-01-28 NOTE — PROGRESS NOTES
Subjective:         Patient ID: Natali Claire is a 54 y.o. female.    Chief Complaint: Ankle Pain (Right )        Pain  This is a chronic problem. The current episode started more than 1 year ago. The problem occurs daily. The problem has been unchanged. Associated symptoms include arthralgias and neck pain. Pertinent negatives include no anorexia, chest pain, chills, coughing, diaphoresis, fever, sore throat, vertigo or vomiting.     Review of Systems   Constitutional:  Negative for activity change, appetite change, chills, diaphoresis, fever and unexpected weight change.   HENT:  Negative for drooling, ear discharge, ear pain, facial swelling, nosebleeds, sore throat, trouble swallowing, voice change and goiter.    Eyes:  Negative for photophobia, pain, discharge, redness and visual disturbance.   Respiratory:  Negative for apnea, cough, choking, chest tightness, shortness of breath, wheezing and stridor.    Cardiovascular:  Negative for chest pain, palpitations and leg swelling.   Gastrointestinal:  Negative for abdominal distention, anorexia, diarrhea, rectal pain, vomiting and fecal incontinence.   Endocrine: Negative for cold intolerance, heat intolerance, polydipsia, polyphagia and polyuria.   Genitourinary:  Negative for bladder incontinence, dysuria, flank pain, frequency and hot flashes.   Musculoskeletal:  Positive for arthralgias, back pain, leg pain and neck pain.   Integumentary:  Negative for color change and pallor.   Allergic/Immunologic: Negative for immunocompromised state.   Neurological:  Negative for dizziness, vertigo, seizures, syncope, facial asymmetry, speech difficulty, light-headedness, memory loss and coordination difficulties.   Hematological:  Negative for adenopathy. Does not bruise/bleed easily.   Psychiatric/Behavioral:  Negative for agitation, behavioral problems, confusion, decreased concentration, dysphoric mood, hallucinations, self-injury and suicidal ideas. The patient is not  nervous/anxious and is not hyperactive.            Past Medical History:   Diagnosis Date    Anxiety     Arthritis     Chronic low back pain     Chronic pain syndrome     Degenerative joint disease involving multiple joints     Diabetes mellitus, type 2     GERD (gastroesophageal reflux disease)     Hyperlipidemia     Hypertension     Lumbar radiculopathy     Lumbosacral spondylosis     Multiple joint pain     Onychomycosis     Osteoarthritis     Other long term (current) drug therapy     Rheumatoid arthritis      Past Surgical History:   Procedure Laterality Date    BREAST SURGERY      CHOLECYSTECTOMY      TUBAL LIGATION       Social History     Socioeconomic History    Marital status: Single    Number of children: 2   Occupational History    Occupation: disable   Tobacco Use    Smoking status: Never     Passive exposure: Never    Smokeless tobacco: Never   Substance and Sexual Activity    Alcohol use: Not Currently    Drug use: Never    Sexual activity: Yes     Partners: Male     Social Drivers of Health     Financial Resource Strain: Medium Risk (11/9/2024)    Overall Financial Resource Strain (CARDIA)     Difficulty of Paying Living Expenses: Somewhat hard   Food Insecurity: Food Insecurity Present (11/9/2024)    Hunger Vital Sign     Worried About Running Out of Food in the Last Year: Sometimes true     Ran Out of Food in the Last Year: Sometimes true   Transportation Needs: No Transportation Needs (10/10/2024)    PRAPARE - Transportation     Lack of Transportation (Medical): No     Lack of Transportation (Non-Medical): No   Physical Activity: Insufficiently Active (11/9/2024)    Exercise Vital Sign     Days of Exercise per Week: 2 days     Minutes of Exercise per Session: 60 min   Stress: No Stress Concern Present (11/9/2024)    [employer] [employer] - Occupational Stress Questionnaire     Feeling of Stress : Only a little   Housing Stability: Low Risk  (11/9/2024)    Housing Stability Vital  "Sign     Unable to Pay for Housing in the Last Year: No     Homeless in the Last Year: No     Family History   Problem Relation Name Age of Onset    Hypertension Mother      Diabetes Mother      Hypertension Father      Heart disease Father      Diabetes Sister      Breast cancer Paternal Cousin       Review of patient's allergies indicates:  No Known Allergies     Objective:  Vitals:    02/03/25 1403   BP: (!) 157/91   Resp: 18   Weight: 120.7 kg (266 lb)   Height: 5' 9" (1.753 m)   PainSc:   4                   Physical Exam  Vitals and nursing note reviewed. Exam conducted with a chaperone present.   Constitutional:       General: She is awake. She is not in acute distress.     Appearance: Normal appearance. She is not ill-appearing, toxic-appearing or diaphoretic.   HENT:      Head: Normocephalic and atraumatic.      Nose: Nose normal.      Mouth/Throat:      Mouth: Mucous membranes are moist.      Pharynx: Oropharynx is clear.   Eyes:      Conjunctiva/sclera: Conjunctivae normal.      Pupils: Pupils are equal, round, and reactive to light.   Cardiovascular:      Rate and Rhythm: Normal rate.   Pulmonary:      Effort: Pulmonary effort is normal. No respiratory distress.   Abdominal:      Palpations: Abdomen is soft.   Musculoskeletal:         General: Normal range of motion.      Cervical back: Normal range of motion and neck supple. Tenderness present.      Thoracic back: Tenderness present.      Lumbar back: Tenderness present.   Skin:     General: Skin is warm and dry.   Neurological:      General: No focal deficit present.      Mental Status: She is alert and oriented to person, place, and time. Mental status is at baseline.      Cranial Nerves: No cranial nerve deficit (II-XII).   Psychiatric:         Mood and Affect: Mood normal.         Behavior: Behavior normal. Behavior is cooperative.         Thought Content: Thought content normal.           Mammo Digital Screening Bilat w/ Kael  Narrative: " Facility:  [address]  [place] [id number] [phone]    Name: Natali Claire    MRN: [id number]    Result:  Mammo Digital Screening Bilat w/ Kael    History:  Patient is 54 y.o. and is seen for a screening mammogram.    Films Compared:  Compared to: 10/24/2023 Mammo Digital Screening Bilat w/ Kael and   10/18/2022 Mammo Digital Screening Bilat     Findings:  This procedure was performed using tomosynthesis.   Computer-aided detection was utilized in the interpretation of this   examination.    There are scattered areas of fibroglandular density. There is no evidence   of suspicious masses, microcalcifications or architectural distortion.  Impression:    No mammographic evidence of malignancy.    BI-RADS Category 1: Negative    Recommendation:  Routine screening mammogram in 1 year is recommended.    Your estimated lifetime risk of breast cancer (to age 85) based on   Tyrer-Cuzick risk assessment model is 6.78%.  According to the American   Cancer Society, patients with a lifetime breast cancer risk of 20% or   higher might benefit from supplemental screening tests, such as screening   breast MRI.    Kelechi Leon MD         Office Visit on 12/11/2024   Component Date Value Ref Range Status    Molecular Strep A, POC 12/11/2024 Negative  Negative Final     Acceptable 12/11/2024 Yes   Final   Office Visit on 11/12/2024   Component Date Value Ref Range Status    Sodium 11/12/2024 140  136 - 145 mmol/L Final    Potassium 11/12/2024 3.4 (L)  3.5 - 5.1 mmol/L Final    Chloride 11/12/2024 104  98 - 107 mmol/L Final    CO2 11/12/2024 31  21 - 32 mmol/L Final    Anion Gap 11/12/2024 8  7 - 16 mmol/L Final    Glucose 11/12/2024 102  74 - 106 mg/dL Final    BUN 11/12/2024 6 (L)  7 - 18 mg/dL Final    Creatinine 11/12/2024 1.00  0.55 - 1.02 mg/dL Final    BUN/Creatinine Ratio 11/12/2024 6  6 - 20 Final    Calcium 11/12/2024 9.0  8.5 - 10.1 mg/dL Final    Total Protein  11/12/2024 8.2  6.4 - 8.2 g/dL Final    Albumin 11/12/2024 3.3 (L)  3.5 - 5.0 g/dL Final    Globulin 11/12/2024 4.9 (H)  2.0 - 4.0 g/dL Final    A/G Ratio 11/12/2024 0.7   Final    Bilirubin, Total 11/12/2024 0.5  >0.0 - 1.2 mg/dL Final    Alk Phos 11/12/2024 138 (H)  46 - 118 U/L Final    ALT 11/12/2024 30  13 - 56 U/L Final    AST 11/12/2024 32  15 - 37 U/L Final    eGFR 11/12/2024 67  >=60 mL/min/1.73m2 Final    Hemoglobin A1C 11/12/2024 5.0  4.5 - 6.6 % Final    Estimated Average Glucose 11/12/2024 97  mg/dL Final    Triglycerides 11/12/2024 103  35 - 150 mg/dL Final    Cholesterol 11/12/2024 141  0 - 200 mg/dL Final    HDL Cholesterol 11/12/2024 39 (L)  40 - 60 mg/dL Final    Cholesterol/HDL Ratio (Risk Factor) 11/12/2024 3.6   Final    Non-HDL 11/12/2024 102  mg/dL Final    LDL Calculated 11/12/2024 81  mg/dL Final    LDL/HDL 11/12/2024 2.1   Final    VLDL 11/12/2024 21  mg/dL Final    WBC 11/12/2024 4.20 (L)  4.50 - 11.00 K/uL Final    RBC 11/12/2024 4.23  4.20 - 5.40 M/uL Final    Hemoglobin 11/12/2024 12.1  12.0 - 16.0 g/dL Final    Hematocrit 11/12/2024 35.9 (L)  38.0 - 47.0 % Final    MCV 11/12/2024 84.9  80.0 - 96.0 fL Final    MCH 11/12/2024 28.6  27.0 - 31.0 pg Final    MCHC 11/12/2024 33.7  32.0 - 36.0 g/dL Final    RDW 11/12/2024 14.7 (H)  11.5 - 14.5 % Final    Platelet Count 11/12/2024 349  150 - 400 K/uL Final    MPV 11/12/2024 10.1  9.4 - 12.4 fL Final    Neutrophils % 11/12/2024 53.1  53.0 - 65.0 % Final    Lymphocytes % 11/12/2024 38.8  27.0 - 41.0 % Final    Monocytes % 11/12/2024 2.1  2.0 - 6.0 % Final    Eosinophils % 11/12/2024 4.3 (H)  1.0 - 4.0 % Final    Basophils % 11/12/2024 1.7 (H)  0.0 - 1.0 % Final    Immature Granulocytes % 11/12/2024 0.0  0.0 - 0.4 % Final    nRBC, Auto 11/12/2024 0.0  <=0.0 % Final    Neutrophils, Abs 11/12/2024 2.23  1.80 - 7.70 K/uL Final    Lymphocytes, Absolute 11/12/2024 1.63  1.00 - 4.80 K/uL Final    Monocytes, Absolute 11/12/2024 0.09  0.00 - 0.80 K/uL  Final    Eosinophils, Absolute 11/12/2024 0.18  0.00 - 0.50 K/uL Final    Basophils, Absolute 11/12/2024 0.07  0.00 - 0.20 K/uL Final    Immature Granulocytes, Absolute 11/12/2024 0.00  0.00 - 0.04 K/uL Final    nRBC, Absolute 11/12/2024 0.00  <=0.00 x10e3/uL Final    Diff Type 11/12/2024 Manual   Final    POC Glucose 11/12/2024 102  70 - 110 MG/DL Final    Segmented Neutrophils, Man % 11/12/2024 53  50 - 62 % Final    Lymphocytes, Man % 11/12/2024 40  27 - 41 % Final    Monocytes, Man % 11/12/2024 2  2 - 6 % Final    Eosinophils, Man % 11/12/2024 3  1 - 4 % Final    Basophils, Man % 11/12/2024 2 (H)  0 - 1 % Final    Platelet Morphology 11/12/2024 Normal  Normal Final    RBC Morphology 11/12/2024 Normal   Final   Office Visit on 10/02/2024   Component Date Value Ref Range Status    POC Amphetamines 10/02/2024 Negative  Negative, Inconclusive Final    POC Barbiturates 10/02/2024 Negative  Negative, Inconclusive Final    POC Benzodiazepines 10/02/2024 Negative  Negative, Inconclusive Final    POC Cocaine 10/02/2024 Negative  Negative, Inconclusive Final    POC THC 10/02/2024 Negative  Negative, Inconclusive Final    POC Methadone 10/02/2024 Negative  Negative, Inconclusive Final    POC Methamphetamine 10/02/2024 Negative  Negative, Inconclusive Final    POC Opiates 10/02/2024 Negative  Negative, Inconclusive Final    POC Oxycodone 10/02/2024 Negative  Negative, Inconclusive Final    POC Phencyclidine 10/02/2024 Negative  Negative, Inconclusive Final    POC Methylenedioxymethamphetamine * 10/02/2024 Negative  Negative, Inconclusive Final    POC Tricyclic Antidepressants 10/02/2024 Negative  Negative, Inconclusive Final    POC Buprenorphine 10/02/2024 Negative   Final     Acceptable 10/02/2024 Yes   Final    POC Temperature (Urine) 10/02/2024 92   Final    Creatinine, U 10/02/2024 41.9  mg/dL Final    Specific Gravity 10/02/2024 1.008   Final    pH 10/02/2024 5.7   Final    Oxidants 10/02/2024 Negative   Cutoff: 200 mg/L Final    Comment 10/02/2024 Normal   Final    Codeine 10/02/2024 Not Detected  Cutoff: 25 ng/mL Final    C6BG 10/02/2024 Not Detected  Cutoff: 100 ng/mL Final    Morphine 10/02/2024 Not Detected  Cutoff: 25 ng/mL Final    M6BG 10/02/2024 Not Detected  Cutoff: 100 ng/mL Final    6 Monoacetylmorphine 10/02/2024 Not Detected  Cutoff: 25 ng/mL Final    Hydrocodone 10/02/2024 Present (A)  Cutoff: 25 ng/mL Final    Norhydrocodone 10/02/2024 Present (A)  Cutoff: 25 ng/mL Final    Dihydrocodeine 10/02/2024 Present (A)  Cutoff: 25 ng/mL Final    Hydromorphone 10/02/2024 Not Detected  Cutoff: 25 ng/mL Final    Hydromorphone-3-beta-glucuronide 10/02/2024 Not Detected  Cutoff: 100 ng/mL Final    Oxycodone 10/02/2024 Not Detected  Cutoff: 25 ng/mL Final    Noroxycodone 10/02/2024 Not Detected  Cutoff: 25 ng/mL Final    Oxymorphone 10/02/2024 Not Detected  Cutoff: 25 ng/mL Final    Oxymorphone-3-beta-glucuronid 10/02/2024 Not Detected  Cutoff: 100 ng/mL Final    Noroxymorphone 10/02/2024 Not Detected  Cutoff: 25 ng/mL Final    Fentanyl 10/02/2024 Not Detected  Cutoff: 2 ng/mL Final    Norfentanyl 10/02/2024 Not Detected  Cutoff: 2 ng/mL Final    Meperidine 10/02/2024 Not Detected  Cutoff: 25 ng/mL Final    Normeperidine 10/02/2024 Not Detected  Cutoff: 25 ng/mL Final    Naloxone 10/02/2024 Not Detected  Cutoff: 25 ng/mL Final    Naloxone-3-beta-glucuronide 10/02/2024 Not Detected  Cutoff: 100 ng/mL Final    Methadone 10/02/2024 Not Detected  Cutoff: 25 ng/mL Final    EDDP 10/02/2024 Not Detected  Cutoff: 25 ng/mL Final    Propoxyphene 10/02/2024 Not Detected  Cutoff: 25 ng/mL Final    Norpropoxyphene 10/02/2024 Not Detected  Cutoff: 25 ng/mL Final    Tramadol 10/02/2024 Not Detected  Cutoff: 25 ng/mL Final    O-desmethyltramadol 10/02/2024 Not Detected  Cutoff: 25 ng/mL Final    Tapentadol 10/02/2024 Not Detected  Cutoff: 25 ng/mL Final    N-Desmethyltapentadol 10/02/2024 Not Detected  Cutoff: 50 ng/mL Final    M  TAPENTADOL-BETA-GLUCURONIDE 10/02/2024 Not Detected  Cutoff: 100 ng/mL Final    Buprenorphine 10/02/2024 Not Detected  Cutoff: 5 ng/mL Final    Norbuprenorphine 10/02/2024 Not Detected  Cutoff: 5 ng/mL Final    Norbuprenorphine glucuronide 10/02/2024 Not Detected  Cutoff: 20 ng/mL Final    Opioid Interpretation 10/02/2024 SEE COMMENTS   Final    Cocaine 10/02/2024 Negative  Cutoff: 150 ng/mL Final    Tetrahydrocannabinol 10/02/2024 Negative  Cutoff: 50 ng/mL Final    Alprazolam 10/02/2024 Not Detected  Cutoff: 10 ng/mL Final    Alpha-Hydroxyalprazolam 10/02/2024 Not Detected  Cutoff: 10 ng/mL Final    Alpha-Hydroxyalprazolam Glucuronide 10/02/2024 Not Detected  Cutoff: 50 ng/mL Final    Chlordiazepoxide 10/02/2024 Not Detected  Cutoff: 10 ng/mL Final    Clobazam 10/02/2024 Not Detected  Cutoff: 10 ng/mL Final    N-Desmethylclobazam 10/02/2024 Not Detected  Cutoff: 200 ng/mL Final    Clonazepam 10/02/2024 Not Detected  Cutoff: 10 ng/mL Final    7-aminoclonazepam 10/02/2024 Not Detected  Cutoff: 10 ng/mL Final    Diazepam 10/02/2024 Not Detected  Cutoff: 10 ng/mL Final    Nordiazepam 10/02/2024 Not Detected  Cutoff: 10 ng/mL Final    Flunitrazepam 10/02/2024 Not Detected  Cutoff: 10 ng/mL Final    7-aminoflunitrazepam 10/02/2024 Not Detected  Cutoff: 10 ng/mL Final    Flurazepam 10/02/2024 Not Detected  Cutoff: 10 ng/mL Final    2-Hydroxy Ethyl Flurazepam 10/02/2024 Not Detected  Cutoff: 10 ng/mL Final    Lorazepam 10/02/2024 Not Detected  Cutoff: 10 ng/mL Final    Lorazepam Glucuronide 10/02/2024 Not Detected  Cutoff: 50 ng/mL Final    Midazolam 10/02/2024 Not Detected  Cutoff: 10 ng/mL Final    Alpha-Hydroxy Midazolam 10/02/2024 Not Detected  Cutoff: 10 ng/mL Final    Oxazepam 10/02/2024 Not Detected  Cutoff: 10 ng/mL Final    Oxazepam Glucuronide 10/02/2024 Not Detected  Cutoff: 50 ng/mL Final    Prazepam 10/02/2024 Not Detected  Cutoff: 10 ng/mL Final    Temazepam 10/02/2024 Not Detected  Cutoff: 10 ng/mL Final     Temazepam Glucuronide 10/02/2024 Not Detected  Cutoff: 50 ng/mL Final    Triazolam 10/02/2024 Not Detected  Cutoff: 10 ng/mL Final    Alpha-Hydroxy Triazolam 10/02/2024 Not Detected  Cutoff: 10 ng/mL Final    Zolpidem 10/02/2024 Not Detected  Cutoff: 10 ng/mL Final    Zolpidem Phenyl-4-Carboxylic acid 10/02/2024 Not Detected  Cutoff: 10 ng/mL Final    Benzodiazepine Interpretation 10/02/2024 SEE COMMENTS   Final    List Patient's Current Medications 10/02/2024 na   Final    Methamphetamine 10/02/2024 Not Detected  Cutoff: 100 ng/mL Final    Amphetamine 10/02/2024 Not Detected  Cutoff: 100 ng/mL Final    3,4-methylenedioxymethamphetamine * 10/02/2024 Not Detected  Cutoff: 100 ng/mL Final    3,4-bvqevplduttmed-V-ethylamphetam* 10/02/2024 Not Detected  Cutoff: 100 ng/mL Final    3,4-methylenedioxyamphetamine (MDA) 10/02/2024 Not Detected  Cutoff: 100 ng/mL Final    Ephedrine 10/02/2024 Not Detected  Cutoff: 100 ng/mL Final    Pseudoephedrine 10/02/2024 Not Detected  Cutoff: 100 ng/mL Final    Phentermine 10/02/2024 Not Detected  Cutoff: 100 ng/mL Final    Phencyclidine (PCP) 10/02/2024 Not Detected  Cutoff: 20 ng/mL Final    Methylphenidate 10/02/2024 Not Detected  Cutoff: 20 ng/mL Final    Ritalinic acid 10/02/2024 Not Detected  Cutoff: 100 ng/mL Final    Stimulant Interpretation 10/02/2024 SEE COMMENTS   Final    Barbiturates 10/02/2024 Negative  Cutoff: 200 ng/mL Final         Orders Placed This Encounter   Procedures    POCT Urine Drug Screen Presump     Interpretive Information:     Negative:  No drug detected at the cut off level.   Positive:  This result represents presumptive positive for the   tested drug, other substances may yield a positive response other   than the analyte of interest. This result should be utilized for   diagnostic purpose only. Confirmation testing will be performed upon physician request only.            Requested Prescriptions     Signed Prescriptions Disp Refills     HYDROcodone-acetaminophen (NORCO)  mg per tablet 60 tablet 0     Sig: Take 1 tablet by mouth every 12 (twelve) hours as needed for Pain.    HYDROcodone-acetaminophen (NORCO)  mg per tablet 60 tablet 0     Sig: Take 1 tablet by mouth every 12 (twelve) hours as needed for Pain.    HYDROcodone-acetaminophen (NORCO)  mg per tablet 60 tablet 0     Sig: Take 1 tablet by mouth every 12 (twelve) hours as needed for Pain.       Assessment:     1. Rheumatoid arthritis involving multiple sites with positive rheumatoid factor    2. Lumbosacral spondylosis without myelopathy    3. Cervical radiculopathy    4. Encounter for long-term (current) use of medications                   A's of Opioid Risk Assessment  Activity:Patient can perform ADL.   Analgesia:Patients pain is partially controlled by current medication. Patient has tried OTC medications such as Tylenol and Ibuprofen with out relief.   Adverse Effects: Patient denies constipation or sedation.  Aberrant Behavior:  reviewed with no aberrant drug seeking/taking behavior.  Overdose reversal drug naloxone discussed    Drug screen reviewed      X-ray lumbar spine [facility] November 11, 2020, multiple level degenerative changes no instability noted with flexion-extension no fracture noted      Plan:     Alayuema    Narcan February 2023    Follows rheumatology rheumatoid arthritis [facility]    Complaint right ankle pain worse with range of motion ongoing for several days now     She denies any recent illness or injury     Neurologically intact     Otherwise she states current medications helping control her chronic joint back pain related to her new line rheumatologic condition     She would like to continue conservative management     Declines any evaluation or procedures for right ankle pain     She is requesting a Toradol injection     Toradol 60 mg IM, tolerated    Continue current medication    Follow-up 3 months    Dr. Claire, October 2025    Bring  original prescription medication bottles/container/box with labels to each visit

## 2025-01-29 ENCOUNTER — CLINICAL SUPPORT (OUTPATIENT)
Dept: REHABILITATION | Facility: HOSPITAL | Age: 55
End: 2025-01-29
Payer: COMMERCIAL

## 2025-01-29 DIAGNOSIS — Z74.09 IMPAIRED MOBILITY AND ACTIVITIES OF DAILY LIVING: Primary | ICD-10-CM

## 2025-01-29 DIAGNOSIS — Z78.9 IMPAIRED MOBILITY AND ACTIVITIES OF DAILY LIVING: Primary | ICD-10-CM

## 2025-01-29 PROCEDURE — 97140 MANUAL THERAPY 1/> REGIONS: CPT | Mod: PN | Performed by: GENERAL ACUTE CARE HOSPITAL

## 2025-01-29 PROCEDURE — 97110 THERAPEUTIC EXERCISES: CPT | Mod: PN | Performed by: GENERAL ACUTE CARE HOSPITAL

## 2025-01-29 PROCEDURE — 20560 NDL INSJ W/O NJX 1 OR 2 MUSC: CPT | Mod: PN,CG | Performed by: GENERAL ACUTE CARE HOSPITAL

## 2025-01-29 PROCEDURE — 97112 NEUROMUSCULAR REEDUCATION: CPT | Mod: PN | Performed by: GENERAL ACUTE CARE HOSPITAL

## 2025-01-29 PROCEDURE — 97014 ELECTRIC STIMULATION THERAPY: CPT | Mod: PN | Performed by: GENERAL ACUTE CARE HOSPITAL

## 2025-01-29 NOTE — PROGRESS NOTES
[facility]  Outpatient Rehabilitation - Physical Therapy   Treatment Session    Patient Name: Matilda Decker  MRN: [id number]  YOB: 1970  Today's Date: 1/29/2025    Therapy Diagnosis: No diagnosis found.  Physician: Ramo Bashir MD    Physician Orders: Eval and Treat  Medical Diagnosis: Neck pain [M54.2]     Visit # / Visits Authorized:  3 / 10   Date of Evaluation:  1 /2/ 25  Insurance Authorization Period: 1/2/2025 - 12/31/2025   Plan of Care Certification:  1/29/25 to 2/27/25     Time In: 0805   Time Out: 0900  Total Time: 55   Total Billable Time: 38         Subjective   Patient returns outpatient physical therapy reporting improvement in neck and shoulder pain since in initial initial evaluation. Primary complaint at this time, remains tightness in upper trap and mild soreness and thoracic leg when lifting and twisting at work last week..  Pain reported as 1.      Past Medical History/Physical Systems Review:   Matilda Decker  has a past medical history of Abnormal Pap smear.    Matilda Decker  has a past surgical history that includes Colposcopy; Cervical biopsy w/ loop electrode excision; and colonoscopy, screening, low risk patient (N/A, 10/22/2024).    Matilda has a current medication list which includes the following prescription(s): fluticasone propionate, and the following Facility-Administered Medications: triamcinolone acetonide.    Review of patient's allergies indicates:  No Known Allergies     Objective            Treatment:  CPT Interventions & Parameters   NMR Seated chin tucks x15 reps - verbal cueing for correct performance   Thoracic extension in chair x15 reps    TE UBE 4/4 forward/backward - L3  Self STM with theracane R upper trap, R periscapular region x 5 minutes    NMR Quadruped Cat/Cow x 12 reps (requires pillow to kneel)  Quadruped thoracic rotation R & L x 10 reps each side - verbal and tactile cueing required  (requires pillow to  kneel)   1/2 kneeling lunge step with thoracic rotation stretch x 5 bilateral (requires pillow to kneel)   MT  +  DN 1-2+  +  UES Dry Needling: prone sitting in massage chair   R upper trap: 40mmx.03mm x 2  R supraspinatus 40mm x.03mm x 1  L upper trap: 40mm x .03mm x2  All needles with electrical stimulation            Units Time (min) CPT  Description   timed 44 total billable time     1 13 Therapeutic Exercise  83101 (TE)  to develop: strength, endurance, ROM, flexibility, posture, and core stabilization   2 23 Neuromuscular Re-education  25443 (NMR)  activities to improve: Balance, Coordination, Kinesthetic, Sense, Proprioception, and Posture        Therapeutic Activities  79602 (TA)  to improve: functional performance, impact activities of daily living   1 8 Manual Therapy  47387 (MT) techniques to improve: Joint mobilizations, Manual traction, Myofacial release, Soft tissue Mobilization, and Friction Massage       Gait Training  87849(GT)     untimed         X   Dry Needling  21880 (DN 1-2)   to impact muscular pain, tightness, trigger points, and tissue pliability       Dry Needling  60746 (DN 3+)  to impact muscular pain, tightness, trigger points, and tissue pliability   X   Unattended E-Stimulation  50952 (UES)   for muscle performance or pain modulation       Heat/Cold Modalities  49069 (H/C)   to impact pain, pain modulation, sensitization training        Mechanical Traction  72214 (T)  to impact pain, tissue pliability, range of motion   *A portion of this treatment session is provided with the assistance of a skilled rehabilitation technician under the supervision of a licensed physical therapist  *Billing accurately reflects 1:1 treatment time per patient's insurance guidelines.     Home Exercises and Patient Education Reviewed   Patient was educated on the role of Physical Therapy, Treatment plan, Discharge Goals, Home Exercise Program.  Patient educated on biomechanical justification for therapeutic  exercise and importance of compliance with Home Exercise Program in order to improve overall impairments and Quality of Life.  Patient was educated on all the above exercise prior/during/after for proper posture, positioning, and execution for safe performance with home exercise program.     Disclaimer: This note was prepared using a voice recognition system and is likely to have sound alike errors within the text.    Patient's spiritual, cultural, and educational needs considered and patient agreeable to plan of care and goals.     Assessment & Plan   Assessment: Todays session focused on, including thoracic mobility techniques in both flexion extension ranges in motion, as well as rotation and diagonal positions to impact joint mobility and tissue pliability. Patient was also educated on self muscle relaxation techniques with therapy which provided relief to trigger points in the elena scapular region of the right shoulder.  Evaluation/Treatment Tolerance: Patient tolerated treatment well        Plan: Patient has some difficulty with thoracic rotation mobility exercise exercises at this time, requires verbal and tactile queuing as well as visual demonstrations for correct performance. Do I need to link continues to be a positive intervention to impact trigger points and muscular tension in my lateral upper traps use of electrical stimulation further impacts positive benefit of dry needling for patient at this time.    Goals:   Active       Long Term Goals       Patient will display independent and correct performance of advanced home exercise program without cueing to impact knowledge of condition       Start:  01/02/25    Expected End:  02/27/25            Patient to improve Neck FOTO to 64/100 to display improved activity participation       Start:  01/02/25    Expected End:  02/27/25            Patient to improve L cervical sidebend to 38 ° to display improved mobility and upper trap flexibility        Start:   01/02/25    Expected End:  02/27/25            Patient to improve cervical R rotation to 40 degrees to display improved rotation and cervical control        Start:  01/02/25    Expected End:  02/27/25            Patient will report confidence in managing condition upon discharge from Physical Therapy.       Start:  01/02/25    Expected End:  02/27/25               Short Term Goals       Patient to be independent with foundational home exercise program performance to impact knowledge of condition        Start:  01/02/25    Expected End:  02/02/25            Patient to improve L cervical sidebend to 34 ° to display improved mobility and upper trap flexibility        Start:  01/02/25    Expected End:  02/02/25            Patient to improve Neck FOTO to 59/100 to display improved activity participation       Start:  01/02/25    Expected End:  02/02/25              Brittany Curran PT, DPT, SCS, CSCS  Board Certified Sports Clinical Specialist   Certified Dry Needling Provider  1/29/2025

## 2025-01-31 ENCOUNTER — EXTERNAL CHRONIC CARE MANAGEMENT (OUTPATIENT)
Dept: PRIMARY CARE CLINIC | Facility: CLINIC | Age: 55
End: 2025-01-31
Payer: MEDICARE

## 2025-01-31 PROCEDURE — G0511 CCM/BHI BY RHC/FQHC 20MIN MO: HCPCS | Mod: ,,, | Performed by: FAMILY MEDICINE

## 2025-02-03 ENCOUNTER — OFFICE VISIT (OUTPATIENT)
Dept: PAIN MEDICINE | Facility: CLINIC | Age: 55
End: 2025-02-03
Payer: MEDICARE

## 2025-02-03 VITALS
HEIGHT: 69 IN | DIASTOLIC BLOOD PRESSURE: 91 MMHG | WEIGHT: 266 LBS | SYSTOLIC BLOOD PRESSURE: 157 MMHG | BODY MASS INDEX: 39.4 KG/M2 | RESPIRATION RATE: 18 BRPM

## 2025-02-03 DIAGNOSIS — M47.817 LUMBOSACRAL SPONDYLOSIS WITHOUT MYELOPATHY: Chronic | ICD-10-CM

## 2025-02-03 DIAGNOSIS — Z79.899 ENCOUNTER FOR LONG-TERM (CURRENT) USE OF MEDICATIONS: ICD-10-CM

## 2025-02-03 DIAGNOSIS — M54.12 CERVICAL RADICULOPATHY: Chronic | ICD-10-CM

## 2025-02-03 DIAGNOSIS — M05.79 RHEUMATOID ARTHRITIS INVOLVING MULTIPLE SITES WITH POSITIVE RHEUMATOID FACTOR: Primary | Chronic | ICD-10-CM

## 2025-02-03 PROCEDURE — 99999PBSHW PR PBB SHADOW TECHNICAL ONLY FILED TO HB: Mod: PBBFAC,JZ,,

## 2025-02-03 PROCEDURE — 3008F BODY MASS INDEX DOCD: CPT | Mod: CPTII,,, | Performed by: PHYSICIAN ASSISTANT

## 2025-02-03 PROCEDURE — 1159F MED LIST DOCD IN RCRD: CPT | Mod: CPTII,,, | Performed by: PHYSICIAN ASSISTANT

## 2025-02-03 PROCEDURE — 80305 DRUG TEST PRSMV DIR OPT OBS: CPT | Mod: PBBFAC | Performed by: PHYSICIAN ASSISTANT

## 2025-02-03 PROCEDURE — 96372 THER/PROPH/DIAG INJ SC/IM: CPT | Mod: PBBFAC,JZ | Performed by: PHYSICIAN ASSISTANT

## 2025-02-03 PROCEDURE — 3080F DIAST BP >= 90 MM HG: CPT | Mod: CPTII,,, | Performed by: PHYSICIAN ASSISTANT

## 2025-02-03 PROCEDURE — 99999PBSHW POCT URINE DRUG SCREEN PRESUMP: Mod: PBBFAC,,,

## 2025-02-03 PROCEDURE — 99214 OFFICE O/P EST MOD 30 MIN: CPT | Mod: S$PBB,25,, | Performed by: PHYSICIAN ASSISTANT

## 2025-02-03 PROCEDURE — 99215 OFFICE O/P EST HI 40 MIN: CPT | Mod: PBBFAC | Performed by: PHYSICIAN ASSISTANT

## 2025-02-03 PROCEDURE — 3077F SYST BP >= 140 MM HG: CPT | Mod: CPTII,,, | Performed by: PHYSICIAN ASSISTANT

## 2025-02-03 PROCEDURE — 99999 PR PBB SHADOW E&M-EST. PATIENT-LVL V: CPT | Mod: PBBFAC,,, | Performed by: PHYSICIAN ASSISTANT

## 2025-02-03 RX ORDER — KETOROLAC TROMETHAMINE 30 MG/ML
60 INJECTION, SOLUTION INTRAMUSCULAR; INTRAVENOUS
Status: COMPLETED | OUTPATIENT
Start: 2025-02-03 | End: 2025-02-03

## 2025-02-03 RX ORDER — HYDROCODONE BITARTRATE AND ACETAMINOPHEN 10; 325 MG/1; MG/1
1 TABLET ORAL EVERY 12 HOURS PRN
Qty: 60 TABLET | Refills: 0 | Status: SHIPPED | OUTPATIENT
Start: 2025-04-07 | End: 2025-05-07

## 2025-02-03 RX ORDER — HYDROCODONE BITARTRATE AND ACETAMINOPHEN 10; 325 MG/1; MG/1
1 TABLET ORAL EVERY 12 HOURS PRN
Qty: 60 TABLET | Refills: 0 | Status: SHIPPED | OUTPATIENT
Start: 2025-03-08 | End: 2025-04-07

## 2025-02-03 RX ORDER — HYDROCODONE BITARTRATE AND ACETAMINOPHEN 10; 325 MG/1; MG/1
1 TABLET ORAL EVERY 12 HOURS PRN
Qty: 60 TABLET | Refills: 0 | Status: SHIPPED | OUTPATIENT
Start: 2025-02-06 | End: 2025-03-08

## 2025-02-03 RX ADMIN — KETOROLAC TROMETHAMINE 60 MG: 30 INJECTION, SOLUTION INTRAMUSCULAR at 02:02

## 2025-02-05 ENCOUNTER — CLINICAL SUPPORT (OUTPATIENT)
Dept: REHABILITATION | Facility: HOSPITAL | Age: 55
End: 2025-02-05
Payer: COMMERCIAL

## 2025-02-05 ENCOUNTER — TELEPHONE (OUTPATIENT)
Dept: PRIMARY CARE CLINIC | Facility: CLINIC | Age: 55
End: 2025-02-05
Payer: MEDICARE

## 2025-02-05 DIAGNOSIS — Z78.9 IMPAIRED MOBILITY AND ACTIVITIES OF DAILY LIVING: Primary | ICD-10-CM

## 2025-02-05 DIAGNOSIS — Z74.09 IMPAIRED MOBILITY AND ACTIVITIES OF DAILY LIVING: Primary | ICD-10-CM

## 2025-02-05 PROCEDURE — 97112 NEUROMUSCULAR REEDUCATION: CPT | Mod: PN | Performed by: GENERAL ACUTE CARE HOSPITAL

## 2025-02-05 PROCEDURE — 20560 NDL INSJ W/O NJX 1 OR 2 MUSC: CPT | Mod: PN,CG | Performed by: GENERAL ACUTE CARE HOSPITAL

## 2025-02-05 PROCEDURE — 97014 ELECTRIC STIMULATION THERAPY: CPT | Mod: PN | Performed by: GENERAL ACUTE CARE HOSPITAL

## 2025-02-05 PROCEDURE — 97110 THERAPEUTIC EXERCISES: CPT | Mod: PN | Performed by: GENERAL ACUTE CARE HOSPITAL

## 2025-02-05 PROCEDURE — 97140 MANUAL THERAPY 1/> REGIONS: CPT | Mod: PN | Performed by: GENERAL ACUTE CARE HOSPITAL

## 2025-02-05 NOTE — PROGRESS NOTES
"[facility]  Outpatient Rehabilitation - Physical Therapy   Treatment Session & Reassessment    Patient Name: Matilda Decker  MRN: [id number]  YOB: 1970  Today's Date: 2/5/2025    Therapy Diagnosis: No diagnosis found.  Physician: Ramo Bashir MD    Medical Diagnosis: Neck pain [M54.2]      Visit # / Visits Authorized:  4 / 10   Date of Evaluation:  1 /2/ 25  Insurance Authorization Period: 1/2/2025 - 12/31/2025   Plan of Care Certification:  1/29/25 to 2/26/25  Reassessment Date: 3/7/25     Time In: 0800   Time Out: 0910  Total Time: 70   Total Billable Time: 58         Subjective   Patient reports mild pinch in center of lower cervical region "I am getting there".  Pain reported as 2/10. center lower cervical    Objective        Range of motion:  Cervical R side bend: 34 deg  Cervical L side bend: 42 deg  Cervical R rotation: 50 deg  Cervical L rotation: 45 deg    Treatment:  CPT Interventions & Parameters   NMR Seated chin tucks x15 reps - verbal cueing for correct performance   Thoracic extension in chair x15 reps    TE UBE 4/4 forward/backward - L3  Upper trap stretching bilateral 3x15s each   NMR Green theraband rows 3x10 reps   Palloff press rotations x8 R &L  Yellow band angels forward x8 reps  Yellow band angels backward x8 reps   TE Range of motion:  Cervical R side bend: 34 deg  Cervical L side bend: 42 deg  Cervical R rotation: 50 deg  Cervical L rotation: 45 deg   MT  +  DN 1-2+  +  UES Dry Needling: prone sitting in massage chair   R upper trap: 40mmx.03mm x 2  R supraspinatus 40mm x.03mm x 1  L upper trap: 40mm x .03mm x2  All needles with electrical stimulation      Units Time (min) CPT  Description   timed 58 total billable time     2 25 Therapeutic Exercise  48857 (TE)  to develop: strength, endurance, ROM, flexibility, posture, and core stabilization   2 25 Neuromuscular Re-education  77413 (NMR)  activities to improve: Balance, Coordination, Kinesthetic, " Sense, Proprioception, and Posture        Therapeutic Activities  77728 (TA)  to improve: functional performance, impact activities of daily living   1 8 Manual Therapy  39458 (MT) techniques to improve: Joint mobilizations, Manual traction, Myofacial release, Soft tissue Mobilization, and Friction Massage       Gait Training  00391(GT)     untimed         X   Dry Needling  20560 (DN 1-2)   to impact muscular pain, tightness, trigger points, and tissue pliability       Dry Needling  20560 (DN 3+)  to impact muscular pain, tightness, trigger points, and tissue pliability   X   Unattended E-Stimulation  51869 (UES)   for muscle performance or pain modulation       Heat/Cold Modalities  16968 (H/C)   to impact pain, pain modulation, sensitization training        Mechanical Traction  92391 (T)  to impact pain, tissue pliability, range of motion   *A portion of this treatment session is provided with the assistance of a skilled rehabilitation technician under the supervision of a licensed physical therapist  *Billing accurately reflects 1:1 treatment time per patient's insurance guidelines.     Home Exercises and Patient Education Reviewed   Patient was educated on the role of Physical Therapy, Treatment plan, Discharge Goals, Home Exercise Program.  Patient educated on biomechanical justification for therapeutic exercise and importance of compliance with Home Exercise Program in order to improve overall impairments and Quality of Life.  Patient was educated on all the above exercise prior/during/after for proper posture, positioning, and execution for safe performance with home exercise program.     Disclaimer: This note was prepared using a voice recognition system and is likely to have sound alike errors within the text.      Patient's spiritual, cultural, and educational needs considered and patient agreeable to plan of care and goals.     Assessment & Plan   Assessment: Patient continues to progress well through  physical therapy program. Improvements are noted with FOTO Neck scores as well as progress towards previously established goals.  Evaluation/Treatment Tolerance: Patient tolerated treatment well        Plan: Plan to continue with current scheduled appts empahsis on postural correction, R periscpaular stnregth and eduction of R upper trap for overhead motions. Compliance with home exercise program is encouraged.    Goals:   Active       Long Term Goals       Patient will display independent and correct performance of advanced home exercise program without cueing to impact knowledge of condition (Progressing)       Start:  01/02/25    Expected End:  02/27/25            Patient to improve Neck FOTO to 64/100 to display improved activity participation (Met)       Start:  01/02/25    Expected End:  02/27/25    Resolved:  02/05/25         Patient to improve L cervical sidebend to 38 ° to display improved mobility and upper trap flexibility  (Met)       Start:  01/02/25    Expected End:  02/27/25    Resolved:  02/05/25         Patient to improve cervical R rotation to 40 degrees to display improved rotation and cervical control  (Met)       Start:  01/02/25    Expected End:  02/27/25    Resolved:  02/05/25         Patient will report confidence in managing condition upon discharge from Physical Therapy. (Progressing)       Start:  01/02/25    Expected End:  02/27/25              Resolved       Short Term Goals       Patient to be independent with foundational home exercise program performance to impact knowledge of condition  (Met)       Start:  01/02/25    Expected End:  02/02/25    Resolved:  02/05/25         Patient to improve L cervical sidebend to 34 ° to display improved mobility and upper trap flexibility  (Met)       Start:  01/02/25    Expected End:  02/02/25    Resolved:  02/05/25         Patient to improve Neck FOTO to 59/100 to display improved activity participation (Met)       Start:  01/02/25    Expected  End:  02/02/25    Resolved:  02/05/25             Brittany Curran PT, DPT, SCS, CSCS  Board Certified Sports Clinical Specialist   Certified Dry Needling Provider  2/5/2025

## 2025-02-12 ENCOUNTER — CLINICAL SUPPORT (OUTPATIENT)
Dept: REHABILITATION | Facility: HOSPITAL | Age: 55
End: 2025-02-12
Payer: COMMERCIAL

## 2025-02-12 ENCOUNTER — OFFICE VISIT (OUTPATIENT)
Dept: PRIMARY CARE CLINIC | Facility: CLINIC | Age: 55
End: 2025-02-12
Payer: MEDICARE

## 2025-02-12 VITALS
OXYGEN SATURATION: 99 % | RESPIRATION RATE: 20 BRPM | WEIGHT: 269 LBS | SYSTOLIC BLOOD PRESSURE: 114 MMHG | DIASTOLIC BLOOD PRESSURE: 76 MMHG | BODY MASS INDEX: 39.84 KG/M2 | HEIGHT: 69 IN | HEART RATE: 76 BPM | TEMPERATURE: 98 F

## 2025-02-12 DIAGNOSIS — E11.9 TYPE 2 DIABETES MELLITUS WITHOUT COMPLICATION, WITHOUT LONG-TERM CURRENT USE OF INSULIN: Primary | ICD-10-CM

## 2025-02-12 DIAGNOSIS — Z78.9 IMPAIRED MOBILITY AND ACTIVITIES OF DAILY LIVING: Primary | ICD-10-CM

## 2025-02-12 DIAGNOSIS — E66.01 MORBID OBESITY: ICD-10-CM

## 2025-02-12 DIAGNOSIS — Z74.09 IMPAIRED MOBILITY AND ACTIVITIES OF DAILY LIVING: Primary | ICD-10-CM

## 2025-02-12 DIAGNOSIS — M05.79 RHEUMATOID ARTHRITIS INVOLVING MULTIPLE SITES WITH POSITIVE RHEUMATOID FACTOR: ICD-10-CM

## 2025-02-12 DIAGNOSIS — I10 PRIMARY HYPERTENSION: ICD-10-CM

## 2025-02-12 LAB
ALBUMIN SERPL BCP-MCNC: 3.2 G/DL (ref 3.5–5)
ALBUMIN/GLOB SERPL: 0.7 {RATIO}
ALP SERPL-CCNC: 120 U/L (ref 40–150)
ALT SERPL W P-5'-P-CCNC: 15 U/L
ANION GAP SERPL CALCULATED.3IONS-SCNC: 12 MMOL/L (ref 7–16)
AST SERPL W P-5'-P-CCNC: 33 U/L (ref 5–34)
BASOPHILS # BLD AUTO: 0.06 K/UL (ref 0–0.2)
BASOPHILS NFR BLD AUTO: 1.2 % (ref 0–1)
BILIRUB SERPL-MCNC: 0.4 MG/DL
BUN SERPL-MCNC: 9 MG/DL (ref 10–20)
BUN/CREAT SERPL: 8 (ref 6–20)
CALCIUM SERPL-MCNC: 8.8 MG/DL (ref 8.4–10.2)
CHLORIDE SERPL-SCNC: 106 MMOL/L (ref 98–107)
CO2 SERPL-SCNC: 26 MMOL/L (ref 22–29)
CREAT SERPL-MCNC: 1.12 MG/DL (ref 0.55–1.02)
DIFFERENTIAL METHOD BLD: ABNORMAL
EGFR (NO RACE VARIABLE) (RUSH/TITUS): 58 ML/MIN/1.73M2
EOSINOPHIL # BLD AUTO: 0.2 K/UL (ref 0–0.5)
EOSINOPHIL NFR BLD AUTO: 4.2 % (ref 1–4)
ERYTHROCYTE [DISTWIDTH] IN BLOOD BY AUTOMATED COUNT: 15.3 % (ref 11.5–14.5)
EST. AVERAGE GLUCOSE BLD GHB EST-MCNC: 100 MG/DL
GLOBULIN SER-MCNC: 4.4 G/DL (ref 2–4)
GLUCOSE SERPL-MCNC: 102 MG/DL (ref 70–110)
GLUCOSE SERPL-MCNC: 98 MG/DL (ref 74–100)
HBA1C MFR BLD HPLC: 5.1 %
HCT VFR BLD AUTO: 39.2 % (ref 38–47)
HGB BLD-MCNC: 12.2 G/DL (ref 12–16)
IMM GRANULOCYTES # BLD AUTO: 0.01 K/UL (ref 0–0.04)
IMM GRANULOCYTES NFR BLD: 0.2 % (ref 0–0.4)
LYMPHOCYTES # BLD AUTO: 1.72 K/UL (ref 1–4.8)
LYMPHOCYTES NFR BLD AUTO: 35.8 % (ref 27–41)
MCH RBC QN AUTO: 27.5 PG (ref 27–31)
MCHC RBC AUTO-ENTMCNC: 31.1 G/DL (ref 32–36)
MCV RBC AUTO: 88.5 FL (ref 80–96)
MONOCYTES # BLD AUTO: 0.36 K/UL (ref 0–0.8)
MONOCYTES NFR BLD AUTO: 7.5 % (ref 2–6)
MPC BLD CALC-MCNC: 10 FL (ref 9.4–12.4)
NEUTROPHILS # BLD AUTO: 2.46 K/UL (ref 1.8–7.7)
NEUTROPHILS NFR BLD AUTO: 51.1 % (ref 53–65)
NRBC # BLD AUTO: 0 X10E3/UL
NRBC, AUTO (.00): 0 %
PLATELET # BLD AUTO: 291 K/UL (ref 150–400)
POTASSIUM SERPL-SCNC: 4.3 MMOL/L (ref 3.5–5.1)
PROT SERPL-MCNC: 7.6 G/DL (ref 6.4–8.3)
RBC # BLD AUTO: 4.43 M/UL (ref 4.2–5.4)
SODIUM SERPL-SCNC: 140 MMOL/L (ref 136–145)
WBC # BLD AUTO: 4.81 K/UL (ref 4.5–11)

## 2025-02-12 PROCEDURE — 97140 MANUAL THERAPY 1/> REGIONS: CPT | Mod: PN | Performed by: GENERAL ACUTE CARE HOSPITAL

## 2025-02-12 PROCEDURE — 97110 THERAPEUTIC EXERCISES: CPT | Mod: PN | Performed by: GENERAL ACUTE CARE HOSPITAL

## 2025-02-12 PROCEDURE — 20560 NDL INSJ W/O NJX 1 OR 2 MUSC: CPT | Mod: PN,CG | Performed by: GENERAL ACUTE CARE HOSPITAL

## 2025-02-12 PROCEDURE — 97112 NEUROMUSCULAR REEDUCATION: CPT | Mod: PN | Performed by: GENERAL ACUTE CARE HOSPITAL

## 2025-02-12 PROCEDURE — 97014 ELECTRIC STIMULATION THERAPY: CPT | Mod: PN | Performed by: GENERAL ACUTE CARE HOSPITAL

## 2025-02-12 NOTE — PROGRESS NOTES
Subjective     Patient ID: Natali Claire is a 54 y.o. female.    Chief Complaint: Annual Exam (3 month check up) and Diabetes    Doing well.    Diabetes  Pertinent negatives for hypoglycemia include no confusion or headaches. Pertinent negatives for diabetes include no chest pain and no fatigue.     Review of Systems   Constitutional:  Negative for fatigue and fever.   HENT:  Negative for nasal congestion and dental problem.    Eyes:  Negative for discharge.   Respiratory:  Negative for cough and shortness of breath.    Cardiovascular:  Negative for chest pain.   Gastrointestinal:  Negative for constipation, diarrhea, nausea and vomiting.   Genitourinary:  Negative for bladder incontinence, difficulty urinating and hot flashes.   Allergic/Immunologic: Negative for environmental allergies.   Neurological:  Negative for headaches.   Psychiatric/Behavioral:  Negative for behavioral problems and confusion.           Objective     Physical Exam  Vitals and nursing note reviewed.   Constitutional:       Appearance: Normal appearance. She is obese.   HENT:      Head: Normocephalic and atraumatic.      Right Ear: Tympanic membrane normal.      Left Ear: Tympanic membrane normal.      Nose: Nose normal.      Mouth/Throat:      Mouth: Mucous membranes are moist.   Eyes:      Extraocular Movements: Extraocular movements intact.      Conjunctiva/sclera: Conjunctivae normal.      Pupils: Pupils are equal, round, and reactive to light.   Cardiovascular:      Rate and Rhythm: Normal rate and regular rhythm.      Pulses: Normal pulses.   Pulmonary:      Effort: Pulmonary effort is normal.      Breath sounds: Normal breath sounds.   Abdominal:      General: Abdomen is flat. Bowel sounds are normal.      Palpations: Abdomen is soft.   Musculoskeletal:         General: Normal range of motion.      Cervical back: Normal range of motion and neck supple.   Skin:     General: Skin is warm and dry.   Neurological:      General: No focal  deficit present.      Mental Status: She is alert and oriented to person, place, and time.   Psychiatric:         Mood and Affect: Mood normal.            Assessment and Plan     1. Type 2 diabetes mellitus without complication, without long-term current use of insulin  -     CBC Auto Differential; Future; Expected date: 02/12/2025  -     Comprehensive Metabolic Panel; Future; Expected date: 02/12/2025  -     Hemoglobin A1C; Future; Expected date: 02/12/2025  -     POCT Glucose, Hand-Held Device    2. Morbid obesity    3. Rheumatoid arthritis involving multiple sites with positive rheumatoid factor    4. Primary hypertension        Will call lab results  RTC as needed         No follow-ups on file.

## 2025-02-12 NOTE — PROGRESS NOTES
Outpatient Rehab    Physical Therapy Discharge    Patient Name: Matilda Decker  MRN: [id number]  YOB: 1970  Today's Date: 2/12/2025    Therapy Diagnosis: No diagnosis found.  Physician: Ramo Bashir MD    Physician Orders: Eval and Treat    Medical Diagnosis: Neck pain [M54.2]      Visit # / Visits Authorized:  5 / 10   Date of Evaluation:  1 /2/ 25  Insurance Authorization Period: 1/2/2025 - 12/31/2025   Plan of Care Certification:  1/29/25 to 2/26/25  Reassessment Date: 3/7/25     Time In: 0810   Time Out: 0900  Total Time: 50   Total Billable Time: 45    FOTO:  Intake Score: 54%  Survey Score 1: 69%  Survey Score 2: 78%         Subjective   Patient returns to outpatient physical therapy for her last scheduled treatment session for her neck. Patient reports that overall she is doing fine in regards to neck, pain and tightness..  Pain reported as 1/10. upper traps bilaterally    Objective            Treatment:  CPT Interventions & Parameters   TE UBE 4/4 forward/backward - L3  Upper trap stretching bilateral 3x15s each   NMR Chest doorway stretch with chin tucks 2x5  Thoracic extension in chair x15 reps    NMR Green theraband rows 3x10  Green theraband extension - tandem stance on foam x15 R /L      MT  +  DN 1-2+  +  UES Dry Needling: prone sitting in massage chair   R upper trap: 40mmx.03mm x 2  L upper trap: 40mm x .03mm x2  All needles with electrical stimulation x 6 minutes      Units Time (min) CPT  Description   timed 45 total billable time     1 10 Therapeutic Exercise  64204 (TE)  to develop: strength, endurance, ROM, flexibility, posture, and core stabilization   2 25 Neuromuscular Re-education  63479 (NMR)  activities to improve: Balance, Coordination, Kinesthetic, Sense, Proprioception, and Posture        Therapeutic Activities  76880 (TA)  to improve: functional performance, impact activities of daily living   1 8 Manual Therapy  50187 (MT) techniques to improve: Joint  mobilizations, Manual traction, Myofacial release, Soft tissue Mobilization, and Friction Massage       Gait Training  96700(GT)     untimed         X   Dry Needling  20560 (DN 1-2)   to impact muscular pain, tightness, trigger points, and tissue pliability       Dry Needling  76241 (DN 3+)  to impact muscular pain, tightness, trigger points, and tissue pliability   X   Unattended E-Stimulation  07860 (UES)   for muscle performance or pain modulation       Heat/Cold Modalities  94832 (H/C)   to impact pain, pain modulation, sensitization training        Mechanical Traction  68309 (T)  to impact pain, tissue pliability, range of motion   *A portion of this treatment session is provided with the assistance of a skilled rehabilitation technician under the supervision of a licensed physical therapist  *Billing accurately reflects 1:1 treatment time per patient's insurance guidelines.     Home Exercises and Patient Education Reviewed   Patient was educated on the role of Physical Therapy, Treatment plan, Discharge Goals, Home Exercise Program.  Patient educated on biomechanical justification for therapeutic exercise and importance of compliance with Home Exercise Program in order to improve overall impairments and Quality of Life.  Patient was educated on all the above exercise prior/during/after for proper posture, positioning, and execution for safe performance with home exercise program.     Disclaimer: This note was prepared using a voice recognition system and is likely to have sound alike errors within the text.    Assessment & Plan   Assessment: Overall, patient has made good gains with physical therapy treatment to date. Improvements with range of motion, pain function, and activity tolerance have been noted through throughout this course of physical therapy.  Evaluation/Treatment Tolerance: Patient tolerated treatment well    Patient's spiritual, cultural, and educational needs considered and patient agreeable  to plan of care and goals.           Plan: Patient is to be discharged from physical therapy today with encouragement to remain compliant with daily mobility, postural corrections, and stretches to impact tissue liability and function overall.    Goals:   Resolved       Long Term Goals       Patient will display independent and correct performance of advanced home exercise program without cueing to impact knowledge of condition (Met)       Start:  01/02/25    Expected End:  02/27/25    Resolved:  02/12/25         Patient to improve Neck FOTO to 64/100 to display improved activity participation (Met)       Start:  01/02/25    Expected End:  02/27/25    Resolved:  02/05/25         Patient to improve L cervical sidebend to 38 ° to display improved mobility and upper trap flexibility  (Met)       Start:  01/02/25    Expected End:  02/27/25    Resolved:  02/05/25         Patient to improve cervical R rotation to 40 degrees to display improved rotation and cervical control  (Met)       Start:  01/02/25    Expected End:  02/27/25    Resolved:  02/05/25         Patient will report confidence in managing condition upon discharge from Physical Therapy. (Met)       Start:  01/02/25    Expected End:  02/27/25    Resolved:  02/12/25            Short Term Goals       Patient to be independent with foundational home exercise program performance to impact knowledge of condition  (Met)       Start:  01/02/25    Expected End:  02/02/25    Resolved:  02/05/25         Patient to improve L cervical sidebend to 34 ° to display improved mobility and upper trap flexibility  (Met)       Start:  01/02/25    Expected End:  02/02/25    Resolved:  02/05/25         Patient to improve Neck FOTO to 59/100 to display improved activity participation (Met)       Start:  01/02/25    Expected End:  02/02/25    Resolved:  02/05/25             Brittany Curran PT, DPT, SCS, CSCS  Board Certified Sports Clinical Specialist   Certified Dry Needling  Provider  2/12/2025

## 2025-02-28 ENCOUNTER — EXTERNAL CHRONIC CARE MANAGEMENT (OUTPATIENT)
Dept: PRIMARY CARE CLINIC | Facility: CLINIC | Age: 55
End: 2025-02-28
Payer: MEDICARE

## 2025-02-28 PROCEDURE — G0511 CCM/BHI BY RHC/FQHC 20MIN MO: HCPCS | Mod: ,,, | Performed by: FAMILY MEDICINE

## 2025-03-31 ENCOUNTER — EXTERNAL CHRONIC CARE MANAGEMENT (OUTPATIENT)
Dept: PRIMARY CARE CLINIC | Facility: CLINIC | Age: 55
End: 2025-03-31
Payer: MEDICARE

## 2025-03-31 PROCEDURE — G0511 CCM/BHI BY RHC/FQHC 20MIN MO: HCPCS | Mod: ,,, | Performed by: FAMILY MEDICINE

## 2025-04-02 ENCOUNTER — RESULTS FOLLOW-UP (OUTPATIENT)
Dept: FAMILY MEDICINE | Facility: CLINIC | Age: 55
End: 2025-04-02
Payer: COMMERCIAL

## 2025-04-02 ENCOUNTER — OFFICE VISIT (OUTPATIENT)
Dept: OBSTETRICS AND GYNECOLOGY | Facility: CLINIC | Age: 55
End: 2025-04-02
Payer: COMMERCIAL

## 2025-04-02 ENCOUNTER — HOSPITAL ENCOUNTER (OUTPATIENT)
Dept: RADIOLOGY | Facility: HOSPITAL | Age: 55
Discharge: HOME | End: 2025-04-02
Attending: FAMILY MEDICINE
Payer: COMMERCIAL

## 2025-04-02 VITALS — WEIGHT: 202.63 LBS | SYSTOLIC BLOOD PRESSURE: 108 MMHG | BODY MASS INDEX: 32.7 KG/M2 | DIASTOLIC BLOOD PRESSURE: 66 MMHG

## 2025-04-02 DIAGNOSIS — Z12.31 ENCOUNTER FOR SCREENING MAMMOGRAM FOR BREAST CANCER: ICD-10-CM

## 2025-04-02 DIAGNOSIS — Z01.419 GYNECOLOGIC EXAM NORMAL: Primary | ICD-10-CM

## 2025-04-02 PROCEDURE — 3074F SYST BP LT 130 MM HG: CPT | Mod: CPTII,S$GLB,, | Performed by: OBSTETRICS & GYNECOLOGY

## 2025-04-02 PROCEDURE — 3008F BODY MASS INDEX DOCD: CPT | Mod: CPTII,S$GLB,, | Performed by: OBSTETRICS & GYNECOLOGY

## 2025-04-02 PROCEDURE — 99396 PREV VISIT EST AGE 40-64: CPT | Mod: S$GLB,,, | Performed by: OBSTETRICS & GYNECOLOGY

## 2025-04-02 PROCEDURE — 77063 BREAST TOMOSYNTHESIS BI: CPT | Mod: 26,,, | Performed by: RADIOLOGY

## 2025-04-02 PROCEDURE — 77067 SCR MAMMO BI INCL CAD: CPT | Mod: TC,PN

## 2025-04-02 PROCEDURE — 3078F DIAST BP <80 MM HG: CPT | Mod: CPTII,S$GLB,, | Performed by: OBSTETRICS & GYNECOLOGY

## 2025-04-02 PROCEDURE — 1159F MED LIST DOCD IN RCRD: CPT | Mod: CPTII,S$GLB,, | Performed by: OBSTETRICS & GYNECOLOGY

## 2025-04-02 PROCEDURE — 99999 PR PBB SHADOW E&M-EST. PATIENT-LVL III: CPT | Mod: PBBFAC,,, | Performed by: OBSTETRICS & GYNECOLOGY

## 2025-04-02 PROCEDURE — 77067 SCR MAMMO BI INCL CAD: CPT | Mod: 26,,, | Performed by: RADIOLOGY

## 2025-04-02 NOTE — PROGRESS NOTES
Negative mammogram, repeat in 1 year, result released through [employer].  Please call the patient if not enrolled with my chart.   [phone] There are no preventive care reminders to display for this patient.

## 2025-04-02 NOTE — PROGRESS NOTES
Chief Complaint   Patient presents with    Annual Exam       History and Physical:  Patient's last menstrual period was 03/15/2025 (approximate).       Matilda Decker is a 54 y.o.  -American Female who presents today for her routine annual GYN exam. The patient has no Gynecology complaints today. Menses ever y1-2 months, no menopausal sx. Counseled       Allergies: Review of patient's allergies indicates:  No Known Allergies    Past Medical History:   Diagnosis Date    Abnormal Pap smear     repeat pap       Past Surgical History:   Procedure Laterality Date    CERVICAL BIOPSY  W/ LOOP ELECTRODE EXCISION      COLONOSCOPY, SCREENING, LOW RISK PATIENT N/A 10/22/2024    Procedure: COLONOSCOPY, SCREENING, LOW RISK PATIENT;  Surgeon: Devaughn Whittington MD;  Location: [facility];  Service: Endoscopy;  Laterality: N/A;    COLPOSCOPY         MEDS: Medications Ordered Prior to Encounter[1]    OB History          2    Para   2    Term   1            AB        Living             SAB        IAB        Ectopic        Multiple        Live Births                     Social History[2]    Family History   Problem Relation Name Age of Onset    Breast cancer Neg Hx      Ovarian cancer Neg Hx           Past medical and surgical history reviewed.   I have reviewed the patient's medical history in detail and updated the computerized patient record.    Review of System:   General: no chills, fever, night sweats, weight gain or weight loss  Psychological: no depression or suicidal ideation  Breasts: no new or changing breast lumps, nipple discharge or masses.  Respiratory: no cough, shortness of breath, or wheezing  Cardiovascular: no chest pain or dyspnea on exertion  Gastrointestinal: no abdominal pain, change in bowel habits, or black or bloody stools  Genito-Urinary: no incontinence, urinary frequency/urgency or vulvar/vaginal symptoms, pelvic pain or abnormal vaginal bleeding.  Musculoskeletal: no gait  disturbance or muscular weakness      Physical Exam:   /66 (Patient Position: Sitting)   Wt 91.9 kg (202 lb 9.6 oz)   LMP 03/15/2025 (Approximate)   BMI 32.70 kg/m²   Constitutional: She appears alert and responsive. She appears well-developed, well-groomed, and well-nourished. No distress. OverWeight   HENT:   Head: Normocephalic and atraumatic.   Eyes: Conjunctivae and EOM are normal. No scleral icterus.   Neck: Symmetrical. Normal range of motion. Neck supple. No tracheal deviation present.   Cardiovascular: Normal rate, no rhythm abnormality noted. Extremities without swelling or edema, warm.    Pulmonary/Chest: Normal respiratory Effort. No distress or retractions. She exhibits no tenderness.  Breasts: are symmetrical.   Right breast exhibits no inverted nipple, no mass, no nipple discharge, no skin change and no tenderness.   Left breast exhibits no inverted nipple, no mass, no nipple discharge, no skin change and no tenderness.  Abdominal: Soft. She exhibits no distension, hernias or masses. There is no tenderness. No enlargement of liver edge or spleen.  There is no rebound and no guarding.   Genitourinary:    External rectal exam shows no thrombosed external hemorrhoids, no lesions.     Pelvic exam was performed with patient supine.   No labial fusion, and symmetrical.    There is no rash, lesion or injury on the right labia.   There is no rash, lesion or injury on the left labia.   No bleeding and no signs of injury around the vaginal introitus, urethral meatus is normal size and without prolapse or lesions, urethra well supported. The cervix is visualized with no discharge, lesions or friability.   No vaginal discharge found.   No significant Cystocele, Enterocele or rectocele, and cervix and uterus well supported.   Bimanual exam:   The urethra is normal to palpation and there are no palpable vaginal wall masses.   Uterus is not deviated, not enlarged, not fixed, normal shape and not tender.    Cervix exhibits no motion tenderness.    Right adnexum displays no mass or nodularity and no tenderness.   Left adnexum displays no mass or nodularity and no tenderness.  Musculoskeletal: Normal range of motion.   Neurological: She is alert and oriented to person, place, and time. Coordination normal.   Skin: Skin is warm and dry. She is not diaphoretic. No rashes, lesions or ulcers.   Psychiatric: She has a normal mood and affect, oriented to person, place, and time.      Assessment:   Normal annual GYN exam  perimenopausal    Plan:   PAP  Mammogram  Follow up in 1 year.  Patient informed will be contacted with results within 2 weeks. Encouraged to please call back or email if she has not heard from us by then.         [1]   Current Outpatient Medications on File Prior to Visit   Medication Sig Dispense Refill    fluticasone propionate (FLONASE) 50 mcg/actuation nasal spray Use 2 puffs in each nostril q day. (Patient not taking: Reported on 4/2/2025) 16 g 12     Current Facility-Administered Medications on File Prior to Visit   Medication Dose Route Frequency Provider Last Rate Last Admin    triamcinolone acetonide injection 40 mg  40 mg Intramuscular 1 time in Clinic/HOD Vijaya Campos MD       [2]   Social History  Socioeconomic History    Marital status:    Tobacco Use    Smoking status: Never     Passive exposure: Never    Smokeless tobacco: Never   Substance and Sexual Activity    Alcohol use: No    Drug use: No    Sexual activity: Not Currently     Partners: Male     Birth control/protection: OCP

## 2025-04-02 NOTE — LETTER
April 3, 2025    Matilda Decker  [address]  [facility] [address]             [facility]  [address]  [place] [address]  Phone: [phone]  Fax: [phone] Dear Mrs. Matilda Decker:    Below are the results from your recent visit:    Resulted Orders   Mammo Digital Screening Bilat w/ Dennis (XPD)    Narrative    Facility:  [facility] [address] [place] [address] [phone]    Name: Matilda Decker    MRN: [id number]    Result:  Mammo Digital Screening Bilat w/ Dennis (XPD)    History:  Patient is 54 y.o. and is seen for a screening mammogram.      Films Compared:  Compared to: 03/06/2024 Mammo Digital Screening Bilat w/ Dennis, 01/17/2023   Mammo Digital Screening Bilat w/ Dennis, and 10/13/2021 Mammo Digital   Screening Bilat w/ Dennis     Findings:  This procedure was performed using tomosynthesis.   Computer-aided detection was utilized in the interpretation of this   examination.    There are scattered areas of fibroglandular density. There is no evidence   of suspicious masses, microcalcifications or architectural distortion.      Impression       No mammographic evidence of malignancy.    BI-RADS Category 1: Negative    Recommendation:  Routine screening mammogram in 1 year is recommended.    Your estimated lifetime risk of breast cancer (to age 85) based on   Tyrer-Cuzick risk assessment model is 6.13%.  According to the American   [employer] Society, patients with a lifetime breast cancer risk of 20% or   higher might benefit from supplemental screening tests, such as screening   breast MRI.    Electronically signed by,  Richard Blunt MD         Your results are normal.  Please don't hesitate to call our office if you have any questions or concerns.      Sincerely,        Ramo Bashir MD

## 2025-04-10 ENCOUNTER — PATIENT MESSAGE (OUTPATIENT)
Dept: OBSTETRICS AND GYNECOLOGY | Facility: CLINIC | Age: 55
End: 2025-04-10
Payer: COMMERCIAL

## 2025-04-10 RX ORDER — METRONIDAZOLE 7.5 MG/G
1 GEL VAGINAL NIGHTLY
Qty: 70 G | Refills: 3 | Status: SHIPPED | OUTPATIENT
Start: 2025-04-10 | End: 2025-04-15